# Patient Record
Sex: FEMALE | Race: WHITE | NOT HISPANIC OR LATINO | Employment: OTHER | URBAN - METROPOLITAN AREA
[De-identification: names, ages, dates, MRNs, and addresses within clinical notes are randomized per-mention and may not be internally consistent; named-entity substitution may affect disease eponyms.]

---

## 2023-01-19 PROBLEM — R26.89 IMBALANCE: Status: ACTIVE | Noted: 2023-01-19

## 2023-01-19 PROBLEM — M19.90 OSTEOARTHROSIS: Status: ACTIVE | Noted: 2023-01-19

## 2023-01-19 PROBLEM — Z78.9 INTOLERANCE OF CONTINUOUS POSITIVE AIRWAY PRESSURE (CPAP) VENTILATION: Status: ACTIVE | Noted: 2023-01-19

## 2023-01-19 PROBLEM — A08.4 VIRAL GASTROENTERITIS: Status: ACTIVE | Noted: 2023-01-19

## 2023-01-19 PROBLEM — R45.851 SUICIDAL IDEATION: Status: ACTIVE | Noted: 2023-01-19

## 2023-01-19 PROBLEM — R19.7 DIARRHEA: Status: ACTIVE | Noted: 2023-01-19

## 2023-01-19 PROBLEM — G47.50 PARASOMNIA: Status: ACTIVE | Noted: 2023-01-19

## 2023-01-19 PROBLEM — L98.9 SKIN DISORDER: Status: ACTIVE | Noted: 2023-01-19

## 2023-01-19 PROBLEM — E78.49 FAMILIAL COMBINED HYPERLIPIDEMIA: Status: ACTIVE | Noted: 2023-01-19

## 2023-01-19 PROBLEM — E10.9 TYPE I DIABETES MELLITUS (MULTI): Status: ACTIVE | Noted: 2023-01-19

## 2023-01-19 PROBLEM — R26.9 GAIT ABNORMALITY: Status: ACTIVE | Noted: 2023-01-19

## 2023-01-19 PROBLEM — G47.30 SLEEP APNEA: Status: ACTIVE | Noted: 2023-01-19

## 2023-01-19 PROBLEM — R42 VERTIGO: Status: ACTIVE | Noted: 2023-01-19

## 2023-01-19 PROBLEM — F51.12 INSUFFICIENT SLEEP SYNDROME: Status: ACTIVE | Noted: 2023-01-19

## 2023-01-19 PROBLEM — E55.9 VITAMIN D DEFICIENCY: Status: ACTIVE | Noted: 2023-01-19

## 2023-01-19 PROBLEM — R51.9 LEFT FACIAL PAIN: Status: ACTIVE | Noted: 2023-01-19
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
## 2023-01-19 PROBLEM — R00.2 PALPITATIONS: Status: ACTIVE | Noted: 2023-01-19

## 2023-01-19 PROBLEM — D50.9 ANEMIA, IRON DEFICIENCY: Status: ACTIVE | Noted: 2023-01-19

## 2023-01-19 PROBLEM — G47.31 CENTRAL SLEEP APNEA: Status: ACTIVE | Noted: 2023-01-19

## 2023-01-19 PROBLEM — Z12.31 OTHER SCREENING MAMMOGRAM: Status: ACTIVE | Noted: 2023-01-19

## 2023-01-19 PROBLEM — R20.0 RIGHT ARM NUMBNESS: Status: ACTIVE | Noted: 2023-01-19

## 2023-01-19 PROBLEM — E66.9 OBESITY: Status: ACTIVE | Noted: 2023-01-19

## 2023-01-19 PROBLEM — F33.9 DEPRESSION, RECURRENT (CMS-HCC): Status: ACTIVE | Noted: 2023-01-19

## 2023-01-19 PROBLEM — E78.5 HYPERLIPIDEMIA: Status: ACTIVE | Noted: 2023-01-19

## 2023-01-19 PROBLEM — G47.33 OBSTRUCTIVE SLEEP APNEA: Status: ACTIVE | Noted: 2023-01-19

## 2023-01-19 PROBLEM — R29.91: Status: ACTIVE | Noted: 2023-01-19

## 2023-01-19 PROBLEM — G47.19 EXCESSIVE DAYTIME SLEEPINESS: Status: ACTIVE | Noted: 2023-01-19

## 2023-01-19 PROBLEM — L91.8 SKIN TAG: Status: ACTIVE | Noted: 2023-01-19

## 2023-01-19 PROBLEM — R92.8 ABNORMAL ULTRASOUND OF BREAST: Status: ACTIVE | Noted: 2023-01-19

## 2023-01-19 PROBLEM — G40.201: Status: ACTIVE | Noted: 2023-01-19

## 2023-01-19 PROBLEM — R41.3 MEMORY LOSS: Status: ACTIVE | Noted: 2023-01-19

## 2023-01-19 PROBLEM — R59.0 AXILLARY LYMPHADENOPATHY: Status: ACTIVE | Noted: 2023-01-19

## 2023-01-19 PROBLEM — R06.81 APNEA: Status: ACTIVE | Noted: 2023-01-19

## 2023-01-19 PROBLEM — G47.00 INSOMNIA: Status: ACTIVE | Noted: 2023-01-19

## 2023-01-19 PROBLEM — G40.909 EPILEPSY (MULTI): Status: ACTIVE | Noted: 2023-01-19

## 2023-01-19 PROBLEM — F09 COGNITIVE DISORDER: Status: ACTIVE | Noted: 2023-01-19

## 2023-01-19 PROBLEM — I10 HYPERTENSION: Status: ACTIVE | Noted: 2023-01-19

## 2023-01-19 PROBLEM — R92.8 ABNORMAL MAMMOGRAM: Status: ACTIVE | Noted: 2023-01-19

## 2023-01-19 PROBLEM — R55 SYNCOPE: Status: ACTIVE | Noted: 2023-01-19

## 2023-01-19 PROBLEM — K21.9 ESOPHAGEAL REFLUX: Status: ACTIVE | Noted: 2023-01-19

## 2023-01-19 PROBLEM — N64.89 HEMATOMA OF BREAST: Status: ACTIVE | Noted: 2023-01-19

## 2023-01-19 PROBLEM — T50.905S: Status: ACTIVE | Noted: 2023-01-19

## 2023-01-19 PROBLEM — E11.9 TYPE 2 DIABETES MELLITUS (MULTI): Status: ACTIVE | Noted: 2023-01-19

## 2023-01-19 PROBLEM — R42 DIZZINESS: Status: ACTIVE | Noted: 2023-01-19

## 2023-01-19 PROBLEM — N63.20 LEFT BREAST MASS: Status: ACTIVE | Noted: 2023-01-19

## 2023-01-19 PROBLEM — N64.4 BREAST PAIN, LEFT: Status: ACTIVE | Noted: 2023-01-19

## 2023-01-19 RX ORDER — CALCIUM CARBONATE/VITAMIN D3 600MG-5MCG
1 TABLET ORAL DAILY
COMMUNITY
Start: 2013-06-10

## 2023-01-19 RX ORDER — LACOSAMIDE 100 MG/1
1 TABLET ORAL 2 TIMES DAILY
COMMUNITY
Start: 2016-12-15 | End: 2024-02-19 | Stop reason: ALTCHOICE

## 2023-01-19 RX ORDER — METFORMIN HYDROCHLORIDE 500 MG/1
1 TABLET ORAL DAILY
COMMUNITY
Start: 2012-09-02 | End: 2023-03-13

## 2023-01-19 RX ORDER — FERROUS SULFATE 325(65) MG
1 TABLET ORAL DAILY
COMMUNITY
Start: 2020-08-28

## 2023-01-19 RX ORDER — DONEPEZIL HYDROCHLORIDE 5 MG/1
1 TABLET, FILM COATED ORAL NIGHTLY
COMMUNITY
Start: 2022-05-31

## 2023-01-19 RX ORDER — DULOXETIN HYDROCHLORIDE 60 MG/1
1 CAPSULE, DELAYED RELEASE ORAL 2 TIMES DAILY
COMMUNITY
Start: 2014-07-16

## 2023-01-19 RX ORDER — MECLIZINE HCL 12.5 MG 12.5 MG/1
1 TABLET ORAL 3 TIMES DAILY PRN
COMMUNITY
Start: 2021-10-08

## 2023-01-19 RX ORDER — ATORVASTATIN CALCIUM 20 MG/1
1 TABLET, FILM COATED ORAL DAILY
COMMUNITY
Start: 2012-10-20 | End: 2023-04-15

## 2023-01-19 RX ORDER — OMEPRAZOLE 20 MG/1
1 CAPSULE, DELAYED RELEASE ORAL DAILY
COMMUNITY
Start: 2012-09-23 | End: 2023-03-27

## 2023-01-19 RX ORDER — MELOXICAM 7.5 MG/1
1 TABLET ORAL
COMMUNITY
Start: 2013-05-16

## 2023-01-19 RX ORDER — LACOSAMIDE 200 MG/1
1 TABLET ORAL 2 TIMES DAILY
COMMUNITY
Start: 2016-09-07 | End: 2024-02-19 | Stop reason: ALTCHOICE

## 2023-01-19 RX ORDER — MULTIVIT-MIN/FA/LYCOPEN/LUTEIN .4-300-25
1 TABLET ORAL DAILY
COMMUNITY
Start: 2013-06-10

## 2023-01-19 RX ORDER — IBUPROFEN 800 MG/1
1 TABLET ORAL
COMMUNITY
Start: 2022-06-16

## 2023-01-19 RX ORDER — LOSARTAN POTASSIUM 50 MG/1
1 TABLET ORAL DAILY
COMMUNITY
Start: 2012-09-02 | End: 2023-04-15

## 2023-03-03 LAB
ALANINE AMINOTRANSFERASE (SGPT) (U/L) IN SER/PLAS: 12 U/L (ref 7–45)
ALBUMIN (G/DL) IN SER/PLAS: 4.1 G/DL (ref 3.4–5)
ALKALINE PHOSPHATASE (U/L) IN SER/PLAS: 65 U/L (ref 33–136)
ANION GAP IN SER/PLAS: 14 MMOL/L (ref 10–20)
ASPARTATE AMINOTRANSFERASE (SGOT) (U/L) IN SER/PLAS: 12 U/L (ref 9–39)
BILIRUBIN TOTAL (MG/DL) IN SER/PLAS: 0.5 MG/DL (ref 0–1.2)
CALCIUM (MG/DL) IN SER/PLAS: 9.5 MG/DL (ref 8.6–10.6)
CARBON DIOXIDE, TOTAL (MMOL/L) IN SER/PLAS: 29 MMOL/L (ref 21–32)
CHLORIDE (MMOL/L) IN SER/PLAS: 104 MMOL/L (ref 98–107)
CREATININE (MG/DL) IN SER/PLAS: 0.66 MG/DL (ref 0.5–1.05)
ESTIMATED AVERAGE GLUCOSE FOR HBA1C: 105 MG/DL
GFR FEMALE: >90 ML/MIN/1.73M2
GLUCOSE (MG/DL) IN SER/PLAS: 113 MG/DL (ref 74–99)
HEMOGLOBIN A1C/HEMOGLOBIN TOTAL IN BLOOD: 5.3 %
POTASSIUM (MMOL/L) IN SER/PLAS: 4.6 MMOL/L (ref 3.5–5.3)
PROTEIN TOTAL: 6.2 G/DL (ref 6.4–8.2)
SODIUM (MMOL/L) IN SER/PLAS: 142 MMOL/L (ref 136–145)
UREA NITROGEN (MG/DL) IN SER/PLAS: 13 MG/DL (ref 6–23)

## 2023-03-05 ASSESSMENT — ENCOUNTER SYMPTOMS
POLYDIPSIA: 0
FATIGUE: 0
MYALGIAS: 0
HYPERTENSION: 1
POLYPHAGIA: 0

## 2023-03-05 NOTE — PROGRESS NOTES
Subjective   Chief complaint: Estella M Lanese is a 73 y.o. female who presents for multiple chronic problems.    HPI:  Diabetes  She presents for her follow-up diabetic visit. She has type 2 diabetes mellitus. Pertinent negatives for diabetes include no chest pain, no fatigue, no foot ulcerations, no polydipsia and no polyphagia.   Hypertension  This is a chronic problem. The current episode started more than 1 year ago. The problem is unchanged. The problem is controlled. Pertinent negatives include no anxiety or chest pain.   Hyperlipidemia  This is a chronic problem. The current episode started more than 1 year ago. The problem is controlled. Pertinent negatives include no chest pain or myalgias.       Review of Systems   Constitutional:  Negative for fatigue.   Cardiovascular:  Negative for chest pain.   Endocrine: Negative for polydipsia and polyphagia.   Musculoskeletal:  Negative for myalgias.     All systems reviewed and negative except for what was mentioned in the HPI    Objective   There were no vitals taken for this visit.  Physical Exam  Vitals reviewed.   Constitutional:       Appearance: Normal appearance.   HENT:      Head: Normocephalic and atraumatic.   Cardiovascular:      Rate and Rhythm: Normal rate and regular rhythm.   Pulmonary:      Effort: Pulmonary effort is normal.      Breath sounds: Normal breath sounds.   Abdominal:      General: Bowel sounds are normal.      Palpations: Abdomen is soft.   Musculoskeletal:      Cervical back: Neck supple.   Skin:     General: Skin is warm and dry.   Neurological:      General: No focal deficit present.      Mental Status: She is alert.   Psychiatric:         Mood and Affect: Mood normal.         Behavior: Behavior is cooperative.         I have reviewed and reconciled the medication list with the patient today.   Current Outpatient Medications:     calcium carbonate-vitamin D3 600 mg-5 mcg (200 unit) tablet, Take 1 tablet by mouth in the morning.,  Disp: , Rfl:     donepezil (Aricept) 5 mg tablet, Take 1 tablet (5 mg) by mouth at bedtime., Disp: , Rfl:     DULoxetine (Cymbalta) 60 mg DR capsule, Take 1 capsule (60 mg) by mouth in the morning and at bedtime., Disp: , Rfl:     ferrous sulfate 325 (65 Fe) MG tablet, Take 1 tablet (325 mg) by mouth in the morning., Disp: , Rfl:     ibuprofen 800 mg tablet, Take 1 tablet (800 mg) by mouth after breakfast, after lunch, and after evening meal., Disp: , Rfl:     lacosamide (Vimpat) 100 mg tablet, Take 1 tablet (100 mg) by mouth in the morning and at bedtime. WITH 200 MG TABLET, Disp: , Rfl:     lacosamide (Vimpat) 200 mg tablet tablet, Take 1 tablet (200 mg) by mouth in the morning and at bedtime. WITH 100 MG TABLET, Disp: , Rfl:     losartan (Cozaar) 50 mg tablet, Take 1 tablet (50 mg) by mouth in the morning., Disp: , Rfl:     meloxicam (Mobic) 7.5 mg tablet, Take 1 tablet (7.5 mg) by mouth with breakfast and with evening meal., Disp: , Rfl:     metFORMIN (Glucophage) 500 mg tablet, Take 1 tablet (500 mg) by mouth in the morning., Disp: , Rfl:     multivitamin-iron-minerals-folic acid (Centrum Silver) 0.4 mg-300 mcg- 250 mcg tablet, Take 1 tablet by mouth in the morning., Disp: , Rfl:     omeprazole (PriLOSEC) 20 mg DR capsule, Take 1 capsule (20 mg) by mouth in the morning., Disp: , Rfl:     atorvastatin (Lipitor) 20 mg tablet, Take 1 tablet (20 mg) by mouth in the morning., Disp: , Rfl:     meclizine (Antivert) 12.5 mg tablet, Take 1 tablet (12.5 mg) by mouth if needed in the morning, at noon, and at bedtime., Disp: , Rfl:      Imaging:  No results found.     Labs reviewed:    Lab Results   Component Value Date    WBC 7.3 12/06/2022    HGB 12.9 12/06/2022    HCT 44.2 12/06/2022     12/06/2022    CHOL 169 12/06/2022    TRIG 115 12/06/2022    HDL 64.5 12/06/2022    ALT 12 03/03/2023    AST 12 03/03/2023     03/03/2023    K 4.6 03/03/2023     03/03/2023    CREATININE 0.66 03/03/2023    BUN 13  03/03/2023    CO2 29 03/03/2023    TSH 1.63 12/06/2022    INR 1.1 06/27/2021    HGBA1C 5.3 03/03/2023       Assessment/Plan   Problem List Items Addressed This Visit          Nervous    Epilepsy (CMS/HCC)     Continue Keppra and lacosamide for the seizures.         Cognitive disorder     Stable continue Aricept            Respiratory    Apnea - Primary     Patient refused to use the CPAP and she understand the risk.            Circulatory    Hypertension     Continue lisinopril blood pressure stable under control.            Digestive    Esophageal reflux     Continue PPI avoid acidic food.            Musculoskeletal    Osteoarthrosis     Continue meloxicam pain under control            Endocrine/Metabolic    Type 2 diabetes mellitus (CMS/HCC)     Follow low-carb diet continue metformin.         Vitamin D deficiency     Continue vitamin D check the level twice a year.            Other    Depression, recurrent (CMS/HCC)     Continue antidepressant.  Follow-up with psychiatrist.         Hyperlipidemia     Continue statin follow low-fat low-carb diet flaxseed and fish oil.            Continue current medications as listed  Follow up in 3 months we will check a BMP, hemoglobin A1c, mammogram

## 2023-03-06 ENCOUNTER — OFFICE VISIT (OUTPATIENT)
Dept: PRIMARY CARE | Facility: CLINIC | Age: 73
End: 2023-03-06
Payer: MEDICARE

## 2023-03-06 VITALS
HEART RATE: 77 BPM | BODY MASS INDEX: 26.89 KG/M2 | RESPIRATION RATE: 12 BRPM | WEIGHT: 147 LBS | OXYGEN SATURATION: 98 % | DIASTOLIC BLOOD PRESSURE: 62 MMHG | SYSTOLIC BLOOD PRESSURE: 130 MMHG

## 2023-03-06 DIAGNOSIS — I10 HYPERTENSION, UNSPECIFIED TYPE: ICD-10-CM

## 2023-03-06 DIAGNOSIS — F33.9 DEPRESSION, RECURRENT (CMS-HCC): ICD-10-CM

## 2023-03-06 DIAGNOSIS — E11.9 TYPE 2 DIABETES MELLITUS WITHOUT COMPLICATION, WITHOUT LONG-TERM CURRENT USE OF INSULIN (MULTI): Primary | ICD-10-CM

## 2023-03-06 DIAGNOSIS — F09 COGNITIVE DISORDER: ICD-10-CM

## 2023-03-06 DIAGNOSIS — G40.909 NONINTRACTABLE EPILEPSY WITHOUT STATUS EPILEPTICUS, UNSPECIFIED EPILEPSY TYPE (MULTI): ICD-10-CM

## 2023-03-06 DIAGNOSIS — E55.9 VITAMIN D DEFICIENCY: ICD-10-CM

## 2023-03-06 DIAGNOSIS — K21.9 GASTROESOPHAGEAL REFLUX DISEASE WITHOUT ESOPHAGITIS: ICD-10-CM

## 2023-03-06 DIAGNOSIS — E78.5 HYPERLIPIDEMIA, UNSPECIFIED HYPERLIPIDEMIA TYPE: ICD-10-CM

## 2023-03-06 DIAGNOSIS — F02.B4 MODERATE ALZHEIMER'S DEMENTIA WITH ANXIETY, UNSPECIFIED TIMING OF DEMENTIA ONSET (MULTI): ICD-10-CM

## 2023-03-06 DIAGNOSIS — F03.B4 MODERATE DEMENTIA WITH ANXIETY, UNSPECIFIED DEMENTIA TYPE (MULTI): ICD-10-CM

## 2023-03-06 DIAGNOSIS — M19.91 PRIMARY OSTEOARTHRITIS, UNSPECIFIED SITE: ICD-10-CM

## 2023-03-06 DIAGNOSIS — E11.10 TYPE 2 DIABETES MELLITUS WITH KETOACIDOSIS WITHOUT COMA, WITHOUT LONG-TERM CURRENT USE OF INSULIN (MULTI): ICD-10-CM

## 2023-03-06 DIAGNOSIS — G30.9 MODERATE ALZHEIMER'S DEMENTIA WITH ANXIETY, UNSPECIFIED TIMING OF DEMENTIA ONSET (MULTI): ICD-10-CM

## 2023-03-06 DIAGNOSIS — R06.81 APNEA: ICD-10-CM

## 2023-03-06 PROCEDURE — 1036F TOBACCO NON-USER: CPT | Performed by: INTERNAL MEDICINE

## 2023-03-06 PROCEDURE — 3044F HG A1C LEVEL LT 7.0%: CPT | Performed by: INTERNAL MEDICINE

## 2023-03-06 PROCEDURE — 1159F MED LIST DOCD IN RCRD: CPT | Performed by: INTERNAL MEDICINE

## 2023-03-06 PROCEDURE — 3078F DIAST BP <80 MM HG: CPT | Performed by: INTERNAL MEDICINE

## 2023-03-06 PROCEDURE — 99214 OFFICE O/P EST MOD 30 MIN: CPT | Performed by: INTERNAL MEDICINE

## 2023-03-06 PROCEDURE — 3075F SYST BP GE 130 - 139MM HG: CPT | Performed by: INTERNAL MEDICINE

## 2023-03-06 PROCEDURE — 4010F ACE/ARB THERAPY RXD/TAKEN: CPT | Performed by: INTERNAL MEDICINE

## 2023-03-06 PROCEDURE — 82962 GLUCOSE BLOOD TEST: CPT | Performed by: INTERNAL MEDICINE

## 2023-03-06 PROCEDURE — 1160F RVW MEDS BY RX/DR IN RCRD: CPT | Performed by: INTERNAL MEDICINE

## 2023-03-06 NOTE — PROGRESS NOTES
Subjective   Chief complaint: Estella M Lanese is a 73 y.o. female who presents for No chief complaint on file..    HPI:  Patient is here today for blood work follow up and general medical care.         Review of Systems  All systems reviewed and negative except for what was mentioned in the HPI    Objective   There were no vitals taken for this visit.  Physical Exam    I have reviewed and reconciled the medication list with the patient today.   Current Outpatient Medications:     calcium carbonate-vitamin D3 600 mg-5 mcg (200 unit) tablet, Take 1 tablet by mouth in the morning., Disp: , Rfl:     donepezil (Aricept) 5 mg tablet, Take 1 tablet (5 mg) by mouth at bedtime., Disp: , Rfl:     DULoxetine (Cymbalta) 60 mg DR capsule, Take 1 capsule (60 mg) by mouth in the morning and at bedtime., Disp: , Rfl:     ferrous sulfate 325 (65 Fe) MG tablet, Take 1 tablet (325 mg) by mouth in the morning., Disp: , Rfl:     ibuprofen 800 mg tablet, Take 1 tablet (800 mg) by mouth after breakfast, after lunch, and after evening meal., Disp: , Rfl:     lacosamide (Vimpat) 100 mg tablet, Take 1 tablet (100 mg) by mouth in the morning and at bedtime. WITH 200 MG TABLET, Disp: , Rfl:     lacosamide (Vimpat) 200 mg tablet tablet, Take 1 tablet (200 mg) by mouth in the morning and at bedtime. WITH 100 MG TABLET, Disp: , Rfl:     losartan (Cozaar) 50 mg tablet, Take 1 tablet (50 mg) by mouth in the morning., Disp: , Rfl:     meloxicam (Mobic) 7.5 mg tablet, Take 1 tablet (7.5 mg) by mouth with breakfast and with evening meal., Disp: , Rfl:     metFORMIN (Glucophage) 500 mg tablet, Take 1 tablet (500 mg) by mouth in the morning., Disp: , Rfl:     multivitamin-iron-minerals-folic acid (Centrum Silver) 0.4 mg-300 mcg- 250 mcg tablet, Take 1 tablet by mouth in the morning., Disp: , Rfl:     omeprazole (PriLOSEC) 20 mg DR capsule, Take 1 capsule (20 mg) by mouth in the morning., Disp: , Rfl:     atorvastatin (Lipitor) 20 mg tablet, Take 1  tablet (20 mg) by mouth in the morning., Disp: , Rfl:     meclizine (Antivert) 12.5 mg tablet, Take 1 tablet (12.5 mg) by mouth if needed in the morning, at noon, and at bedtime., Disp: , Rfl:      Imaging:  No results found.     Labs reviewed:    Lab Results   Component Value Date    WBC 7.3 12/06/2022    HGB 12.9 12/06/2022    HCT 44.2 12/06/2022     12/06/2022    CHOL 169 12/06/2022    TRIG 115 12/06/2022    HDL 64.5 12/06/2022    ALT 12 03/03/2023    AST 12 03/03/2023     03/03/2023    K 4.6 03/03/2023     03/03/2023    CREATININE 0.66 03/03/2023    BUN 13 03/03/2023    CO2 29 03/03/2023    TSH 1.63 12/06/2022    INR 1.1 06/27/2021    HGBA1C 5.3 03/03/2023       Assessment/Plan   Problem List Items Addressed This Visit          Nervous    Epilepsy (CMS/HCC)     Continue Keppra and lacosamide for the seizures.         Cognitive disorder     Stable continue Aricept            Respiratory    Apnea - Primary     Patient refused to use the CPAP and she understand the risk.            Circulatory    Hypertension     Continue lisinopril blood pressure stable under control.            Digestive    Esophageal reflux     Continue PPI avoid acidic food.            Musculoskeletal    Osteoarthrosis     Continue meloxicam pain under control            Endocrine/Metabolic    Type 2 diabetes mellitus (CMS/HCC)     Follow low-carb diet continue metformin.         Vitamin D deficiency     Continue vitamin D check the level twice a year.            Other    Depression, recurrent (CMS/HCC)     Continue antidepressant.  Follow-up with psychiatrist.         Hyperlipidemia     Continue statin follow low-fat low-carb diet flaxseed and fish oil.            Continue current medications as listed  Follow up in 3m

## 2023-03-13 DIAGNOSIS — E10.69 TYPE 1 DIABETES MELLITUS WITH OTHER SPECIFIED COMPLICATION (MULTI): ICD-10-CM

## 2023-03-13 RX ORDER — METFORMIN HYDROCHLORIDE 500 MG/1
TABLET ORAL
Qty: 90 TABLET | Refills: 2 | Status: SHIPPED | OUTPATIENT
Start: 2023-03-13

## 2023-03-15 ENCOUNTER — PATIENT OUTREACH (OUTPATIENT)
Dept: CARE COORDINATION | Facility: CLINIC | Age: 73
End: 2023-03-15
Payer: COMMERCIAL

## 2023-03-26 DIAGNOSIS — K21.9 GASTROESOPHAGEAL REFLUX DISEASE WITHOUT ESOPHAGITIS: ICD-10-CM

## 2023-03-27 RX ORDER — OMEPRAZOLE 20 MG/1
CAPSULE, DELAYED RELEASE ORAL
Qty: 90 CAPSULE | Refills: 2 | Status: SHIPPED | OUTPATIENT
Start: 2023-03-27

## 2023-04-13 DIAGNOSIS — E78.5 HYPERLIPIDEMIA, UNSPECIFIED HYPERLIPIDEMIA TYPE: ICD-10-CM

## 2023-04-13 DIAGNOSIS — I10 HYPERTENSION, UNSPECIFIED TYPE: ICD-10-CM

## 2023-04-15 RX ORDER — ATORVASTATIN CALCIUM 20 MG/1
TABLET, FILM COATED ORAL
Qty: 90 TABLET | Refills: 3 | Status: SHIPPED | OUTPATIENT
Start: 2023-04-15

## 2023-04-15 RX ORDER — LOSARTAN POTASSIUM 50 MG/1
TABLET ORAL
Qty: 90 TABLET | Refills: 3 | Status: SHIPPED | OUTPATIENT
Start: 2023-04-15

## 2023-06-05 ENCOUNTER — TELEPHONE (OUTPATIENT)
Dept: PRIMARY CARE | Facility: CLINIC | Age: 73
End: 2023-06-05
Payer: COMMERCIAL

## 2023-06-05 NOTE — TELEPHONE ENCOUNTER
Transition of Care    Inpatient facility: [facility]   Discharge diagnosis: seizure activity   Discharged to: home  Discharge date: 6/6/23  Initial Call date: 6/5/23  Spoke with patient/caregiver: scheduling                                                                     Do you need assistance  visits prior to your PCP visit: Yes  Home health care ordered: No  Have you been contacted by home care and have a start of care date: No  Are you taking medications as prescribed at discharge: Yes    Referral to [facility] Pharmacist: No  Patient advised to bring all medications to PCP follow-up appointment.  Patient advised to follow discharge instructions until provider follow-up.  TCM visit date: 6/13/23   TCM provider visit with: Dr Jaime

## 2023-07-12 ENCOUNTER — NURSING HOME VISIT (OUTPATIENT)
Dept: POST ACUTE CARE | Facility: EXTERNAL LOCATION | Age: 73
End: 2023-07-12
Payer: COMMERCIAL

## 2023-07-12 DIAGNOSIS — R53.1 WEAKNESS: Primary | ICD-10-CM

## 2023-07-12 DIAGNOSIS — G40.201 PARTIAL SYMPTOMATIC EPILEPSY WITH COMPLEX PARTIAL SEIZURES, NOT INTRACTABLE, WITH STATUS EPILEPTICUS (MULTI): ICD-10-CM

## 2023-07-12 DIAGNOSIS — F02.B4 MODERATE ALZHEIMER'S DEMENTIA WITH ANXIETY, UNSPECIFIED TIMING OF DEMENTIA ONSET (MULTI): ICD-10-CM

## 2023-07-12 DIAGNOSIS — G30.9 MODERATE ALZHEIMER'S DEMENTIA WITH ANXIETY, UNSPECIFIED TIMING OF DEMENTIA ONSET (MULTI): ICD-10-CM

## 2023-07-12 DIAGNOSIS — E11.9 TYPE 2 DIABETES MELLITUS WITHOUT COMPLICATION, WITHOUT LONG-TERM CURRENT USE OF INSULIN (MULTI): ICD-10-CM

## 2023-07-12 DIAGNOSIS — I10 HYPERTENSION, ESSENTIAL: ICD-10-CM

## 2023-07-12 PROBLEM — N64.89 HEMATOMA OF BREAST: Status: RESOLVED | Noted: 2023-01-19 | Resolved: 2023-07-12

## 2023-07-12 PROBLEM — R42 VERTIGO: Status: RESOLVED | Noted: 2023-01-19 | Resolved: 2023-07-12

## 2023-07-12 PROBLEM — N64.4 BREAST PAIN, LEFT: Status: RESOLVED | Noted: 2023-01-19 | Resolved: 2023-07-12

## 2023-07-12 PROBLEM — G40.909 EPILEPSY (MULTI): Status: RESOLVED | Noted: 2023-01-19 | Resolved: 2023-07-12
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
## 2023-07-12 PROBLEM — E78.5 HYPERLIPIDEMIA: Status: RESOLVED | Noted: 2023-01-19 | Resolved: 2023-07-12

## 2023-07-12 PROBLEM — T50.905S: Status: RESOLVED | Noted: 2023-01-19 | Resolved: 2023-07-12

## 2023-07-12 PROBLEM — R06.81 APNEA: Status: RESOLVED | Noted: 2023-01-19 | Resolved: 2023-07-12

## 2023-07-12 PROBLEM — R42 DIZZINESS: Status: RESOLVED | Noted: 2023-01-19 | Resolved: 2023-07-12

## 2023-07-12 PROBLEM — R51.9 LEFT FACIAL PAIN: Status: RESOLVED | Noted: 2023-01-19 | Resolved: 2023-07-12

## 2023-07-12 PROBLEM — Z12.31 OTHER SCREENING MAMMOGRAM: Status: RESOLVED | Noted: 2023-01-19 | Resolved: 2023-07-12

## 2023-07-12 PROBLEM — G47.30 SLEEP APNEA: Status: RESOLVED | Noted: 2023-01-19 | Resolved: 2023-07-12

## 2023-07-12 PROBLEM — R45.851 SUICIDAL IDEATION: Status: RESOLVED | Noted: 2023-01-19 | Resolved: 2023-07-12

## 2023-07-12 PROBLEM — F09 COGNITIVE DISORDER: Status: RESOLVED | Noted: 2023-01-19 | Resolved: 2023-07-12

## 2023-07-12 PROBLEM — R26.9 GAIT ABNORMALITY: Status: RESOLVED | Noted: 2023-01-19 | Resolved: 2023-07-12

## 2023-07-12 PROBLEM — R20.0 RIGHT ARM NUMBNESS: Status: RESOLVED | Noted: 2023-01-19 | Resolved: 2023-07-12

## 2023-07-12 PROBLEM — A08.4 VIRAL GASTROENTERITIS: Status: RESOLVED | Noted: 2023-01-19 | Resolved: 2023-07-12

## 2023-07-12 PROBLEM — G47.19 EXCESSIVE DAYTIME SLEEPINESS: Status: RESOLVED | Noted: 2023-01-19 | Resolved: 2023-07-12

## 2023-07-12 PROBLEM — R19.7 DIARRHEA: Status: RESOLVED | Noted: 2023-01-19 | Resolved: 2023-07-12

## 2023-07-12 PROBLEM — E10.9 TYPE I DIABETES MELLITUS (MULTI): Status: RESOLVED | Noted: 2023-01-19 | Resolved: 2023-07-12

## 2023-07-12 PROBLEM — R41.3 MEMORY LOSS: Status: RESOLVED | Noted: 2023-01-19 | Resolved: 2023-07-12

## 2023-07-12 PROBLEM — R55 SYNCOPE: Status: RESOLVED | Noted: 2023-01-19 | Resolved: 2023-07-12

## 2023-07-12 PROBLEM — R26.89 IMBALANCE: Status: RESOLVED | Noted: 2023-01-19 | Resolved: 2023-07-12

## 2023-07-12 PROCEDURE — 99309 SBSQ NF CARE MODERATE MDM 30: CPT | Performed by: NURSE PRACTITIONER

## 2023-07-12 NOTE — PROGRESS NOTES
PROGRESS NOTE    Subjective   Chief complaint: Estella M Lanese is a 73 y.o. female who is an acute skilled patient being seen and evaluated for weakness    HPI:  7/12/2023 patient admitted to skilled nursing facility for therapy and possible long-term care placement due to weakness and confusion.  Patient was at a another facility and was taken home by family.  She had fallen and was found by family with feces all over herself.  Patient was taken to the emergency room and work-up was unremarkable.  She was admitted for placement and was discharged to [facility].      Objective   Vital signs: 117/62, 97.7, 68, 20, 98%, blood sugar 127    Physical Exam  Constitutional:       General: She is not in acute distress.  Eyes:      Extraocular Movements: Extraocular movements intact.   Cardiovascular:      Rate and Rhythm: Normal rate and regular rhythm.   Pulmonary:      Effort: Pulmonary effort is normal.      Breath sounds: Normal breath sounds.   Abdominal:      General: Bowel sounds are normal.      Palpations: Abdomen is soft.   Musculoskeletal:      Cervical back: Neck supple.      Right lower leg: No edema.      Left lower leg: No edema.      Comments: Generalized weakness   Neurological:      Mental Status: She is alert.   Psychiatric:         Mood and Affect: Mood normal.         Behavior: Behavior is cooperative.         Assessment/Plan   Problem List Items Addressed This Visit       Hypertension, essential     Blood pressure at goal  Continue antihypertensives  Continue to monitor         Moderate Alzheimer's dementia with anxiety, unspecified timing of dementia onset (CMS/[facility])     Speech therapy for cognition         Partial symptomatic epilepsy with complex partial seizures, not intractable, with status epilepticus (CMS/[facility])     Lacosamide         Type 2 diabetes mellitus without complication, without long-term current use of insulin (CMS/[facility])     3/3/2023 A1c 5.3  Not currently on  any medications for diabetes  Monitor         Weakness - Primary     Therapy to eval and treat          Medications, treatments, and labs reviewed  Continue medications and treatments as listed in EMR    Jeanette Arellano, APRN-CNP

## 2023-07-12 NOTE — LETTER
Patient: Estella Lanese  : 1950    Encounter Date: 2023    PROGRESS NOTE    Subjective  Chief complaint: Estella M Lanese is a 73 y.o. female who is an acute skilled patient being seen and evaluated for weakness    HPI:  2023 patient admitted to skilled nursing facility for therapy and possible long-term care placement due to weakness and confusion.  Patient was at a another facility and was taken home by family.  She had fallen and was found by family with feces all over herself.  Patient was taken to the emergency room and work-up was unremarkable.  She was admitted for placement and was discharged to [facility].      Objective  Vital signs: 117/62, 97.7, 68, 20, 98%, blood sugar 127    Physical Exam  Constitutional:       General: She is not in acute distress.  Eyes:      Extraocular Movements: Extraocular movements intact.   Cardiovascular:      Rate and Rhythm: Normal rate and regular rhythm.   Pulmonary:      Effort: Pulmonary effort is normal.      Breath sounds: Normal breath sounds.   Abdominal:      General: Bowel sounds are normal.      Palpations: Abdomen is soft.   Musculoskeletal:      Cervical back: Neck supple.      Right lower leg: No edema.      Left lower leg: No edema.      Comments: Generalized weakness   Neurological:      Mental Status: She is alert.   Psychiatric:         Mood and Affect: Mood normal.         Behavior: Behavior is cooperative.         Assessment/Plan  Problem List Items Addressed This Visit       Hypertension, essential     Blood pressure at goal  Continue antihypertensives  Continue to monitor         Moderate Alzheimer's dementia with anxiety, unspecified timing of dementia onset (CMS/HCC)     Speech therapy for cognition         Partial symptomatic epilepsy with complex partial seizures, not intractable, with status epilepticus (CMS/[facility])     Lacosamide         Type 2 diabetes mellitus without complication, without long-term  current use of insulin (CMS/[facility])     3/3/2023 A1c 5.3  Not currently on any medications for diabetes  Monitor         Weakness - Primary     Therapy to eval and treat          Medications, treatments, and labs reviewed  Continue medications and treatments as listed in EMR    SHERRI Cherry      Electronically Signed By: SHERRI Cherry   7/12/23  4:11 PM

## 2023-07-13 ENCOUNTER — NURSING HOME VISIT (OUTPATIENT)
Dept: POST ACUTE CARE | Facility: EXTERNAL LOCATION | Age: 73
End: 2023-07-13
Payer: COMMERCIAL

## 2023-07-13 DIAGNOSIS — G30.9 MODERATE ALZHEIMER'S DEMENTIA WITH ANXIETY, UNSPECIFIED TIMING OF DEMENTIA ONSET (MULTI): ICD-10-CM

## 2023-07-13 DIAGNOSIS — F02.B4 MODERATE ALZHEIMER'S DEMENTIA WITH ANXIETY, UNSPECIFIED TIMING OF DEMENTIA ONSET (MULTI): ICD-10-CM

## 2023-07-13 DIAGNOSIS — I10 HYPERTENSION, ESSENTIAL: ICD-10-CM

## 2023-07-13 DIAGNOSIS — E11.9 TYPE 2 DIABETES MELLITUS WITHOUT COMPLICATION, WITHOUT LONG-TERM CURRENT USE OF INSULIN (MULTI): ICD-10-CM

## 2023-07-13 DIAGNOSIS — R53.1 WEAKNESS: Primary | ICD-10-CM

## 2023-07-13 PROCEDURE — 99309 SBSQ NF CARE MODERATE MDM 30: CPT | Performed by: NURSE PRACTITIONER

## 2023-07-13 NOTE — LETTER
Patient: Estella Lanese  : 1950    Encounter Date: 2023    PROGRESS NOTE    Subjective  Chief complaint: Estella M Lanese is a 73 y.o. female who is an acute skilled patient being seen and evaluated for weakness    HPI:  2023 patient admitted to skilled nursing facility for therapy and possible long-term care placement due to weakness and confusion.  Patient was at a another facility and was taken home by family.  She had fallen and was found by family with feces all over herself.  Patient was taken to the emergency room and work-up was unremarkable.  She was admitted for placement and was discharged to [facility].    2023 Patient continues working with therapy.  Patient is working on strengthening, transfers, and ADLs.  She is able to ambulate with walker with CGA.  Has no new concerns today.  Feeling OK.  Denies constitutional symptoms.  No new concerns per nursing.        Objective  Vital signs: 128/77,     Physical Exam  Constitutional:       General: She is not in acute distress.  Eyes:      Extraocular Movements: Extraocular movements intact.   Cardiovascular:      Rate and Rhythm: Normal rate and regular rhythm.   Pulmonary:      Effort: Pulmonary effort is normal.      Breath sounds: Normal breath sounds.   Abdominal:      General: Bowel sounds are normal.      Palpations: Abdomen is soft.   Musculoskeletal:      Cervical back: Neck supple.      Right lower leg: No edema.      Left lower leg: No edema.      Comments: Generalized weakness   Neurological:      Mental Status: She is alert.   Psychiatric:         Mood and Affect: Mood normal.         Behavior: Behavior is cooperative.         Assessment/Plan  Problem List Items Addressed This Visit       Hypertension, essential     Blood pressure at goal  Continue antihypertensives  Continue to monitor         Moderate Alzheimer's dementia with anxiety, unspecified timing of dementia onset (CMS/[facility])      Speech therapy for cognition         Type 2 diabetes mellitus without complication, without long-term current use of insulin (CMS/[facility])     3/3/2023 A1c 5.3  Not currently on any medications for diabetes  Monitor         Weakness - Primary     Continue with therapy          Medications, treatments, and labs reviewed  Continue medications and treatments as listed in EMR    SHERRI Cherry      Electronically Signed By: SHERRI Cherry   7/15/23 12:44 PM

## 2023-07-13 NOTE — PROGRESS NOTES
PROGRESS NOTE    Subjective   Chief complaint: Estella M Lanese is a 73 y.o. female who is an acute skilled patient being seen and evaluated for weakness    HPI:  7/12/2023 patient admitted to skilled nursing facility for therapy and possible long-term care placement due to weakness and confusion.  Patient was at a another facility and was taken home by family.  She had fallen and was found by family with feces all over herself.  Patient was taken to the emergency room and work-up was unremarkable.  She was admitted for placement and was discharged to [facility].    7/13/2023 Patient continues working with therapy.  Patient is working on strengthening, transfers, and ADLs.  She is able to ambulate with walker with CGA.  Has no new concerns today.  Feeling OK.  Denies constitutional symptoms.  No new concerns per nursing.        Objective   Vital signs: 128/77,     Physical Exam  Constitutional:       General: She is not in acute distress.  Eyes:      Extraocular Movements: Extraocular movements intact.   Cardiovascular:      Rate and Rhythm: Normal rate and regular rhythm.   Pulmonary:      Effort: Pulmonary effort is normal.      Breath sounds: Normal breath sounds.   Abdominal:      General: Bowel sounds are normal.      Palpations: Abdomen is soft.   Musculoskeletal:      Cervical back: Neck supple.      Right lower leg: No edema.      Left lower leg: No edema.      Comments: Generalized weakness   Neurological:      Mental Status: She is alert.   Psychiatric:         Mood and Affect: Mood normal.         Behavior: Behavior is cooperative.         Assessment/Plan   Problem List Items Addressed This Visit       Hypertension, essential     Blood pressure at goal  Continue antihypertensives  Continue to monitor         Moderate Alzheimer's dementia with anxiety, unspecified timing of dementia onset (CMS/[facility])     Speech therapy for cognition         Type 2 diabetes mellitus without  complication, without long-term current use of insulin (CMS/[facility])     3/3/2023 A1c 5.3  Not currently on any medications for diabetes  Monitor         Weakness - Primary     Continue with therapy          Medications, treatments, and labs reviewed  Continue medications and treatments as listed in EMR    Jeanette Arellano, APRJAMAL-CNP

## 2023-07-14 ENCOUNTER — NURSING HOME VISIT (OUTPATIENT)
Dept: POST ACUTE CARE | Facility: EXTERNAL LOCATION | Age: 73
End: 2023-07-14
Payer: COMMERCIAL

## 2023-07-14 DIAGNOSIS — E11.9 TYPE 2 DIABETES MELLITUS WITHOUT COMPLICATION, WITHOUT LONG-TERM CURRENT USE OF INSULIN (MULTI): ICD-10-CM

## 2023-07-14 DIAGNOSIS — E55.9 VITAMIN D DEFICIENCY: ICD-10-CM

## 2023-07-14 DIAGNOSIS — G40.201 PARTIAL SYMPTOMATIC EPILEPSY WITH COMPLEX PARTIAL SEIZURES, NOT INTRACTABLE, WITH STATUS EPILEPTICUS (MULTI): ICD-10-CM

## 2023-07-14 DIAGNOSIS — I10 HYPERTENSION, ESSENTIAL: Primary | ICD-10-CM

## 2023-07-14 DIAGNOSIS — G47.33 OBSTRUCTIVE SLEEP APNEA: ICD-10-CM

## 2023-07-14 DIAGNOSIS — M15.3 POST-TRAUMATIC OSTEOARTHRITIS OF MULTIPLE JOINTS: ICD-10-CM

## 2023-07-14 DIAGNOSIS — G30.9 MODERATE ALZHEIMER'S DEMENTIA WITH ANXIETY, UNSPECIFIED TIMING OF DEMENTIA ONSET (MULTI): ICD-10-CM

## 2023-07-14 DIAGNOSIS — F02.B4 MODERATE ALZHEIMER'S DEMENTIA WITH ANXIETY, UNSPECIFIED TIMING OF DEMENTIA ONSET (MULTI): ICD-10-CM

## 2023-07-14 DIAGNOSIS — R53.1 WEAKNESS: ICD-10-CM

## 2023-07-14 PROCEDURE — 99305 1ST NF CARE MODERATE MDM 35: CPT | Performed by: INTERNAL MEDICINE

## 2023-07-14 NOTE — LETTER
Patient: Estella Lanese  : 1950    Encounter Date: 2023    HISTORY & PHYSICAL    Subjective  Chief complaint: Estella M Lanese is a 73 y.o. female who is a acute skilled care patient being seen and evaluated for multiple medical problems.  Patient presents for weakness.    HPI:  2023 patient admitted to skilled nursing facility for therapy and possible long-term care placement due to weakness and confusion.  Patient was at a another facility and was taken home by family.  She had fallen and was found by family with feces all over herself.  Patient was taken to the emergency room and work-up was unremarkable.  She was admitted for placement and was discharged to [facility].        Past Medical History:   Diagnosis Date   • Acute sinusitis, unspecified 06/10/2013    Acute sinusitis   • Age-related osteoporosis without current pathological fracture 06/10/2013    Osteoporosis   • Atrophic disorder of skin, unspecified 06/10/2013    Atrophoderma   • Benign neoplasm of unspecified breast 06/10/2013    Benign neoplasm of female breast   • Encounter for general adult medical examination without abnormal findings 2018    Encounter for annual health examination   • Encounter for general adult medical examination without abnormal findings 2021    Encounter for annual health examination   • Encounter for general adult medical examination without abnormal findings 2021    Encounter for annual health examination   • Encounter for general adult medical examination without abnormal findings     Encounter for annual health examination   • Encounter for screening mammogram for malignant neoplasm of breast     Visit for screening mammogram   • Excessive daytime sleepiness 2023   • Nausea 2014    Nausea   • Other conditions influencing health status     Hemigastrectomy   • Personal history of diseases of the skin and subcutaneous tissue 06/10/2013    History of  skin disorder   • Personal history of other diseases of the circulatory system     History of hypertension   • Personal history of other diseases of the respiratory system 12/22/2017    History of bronchitis   • Personal history of other endocrine, nutritional and metabolic disease     History of high cholesterol   • Personal history of other specified conditions 08/13/2019    History of dizziness   • Suicidal ideation 01/19/2023   • Type 2 diabetes mellitus without complications (CMS/[facility]) 12/07/2022    Diabetes mellitus   • Vertigo 01/19/2023       Past Surgical History:   Procedure Laterality Date   • COLONOSCOPY  03/28/2013    Complete Colonoscopy   • OTHER SURGICAL HISTORY  03/28/2013    Brain Surgery   • OTHER SURGICAL HISTORY  03/28/2013    Excision Of Lesion Genitalia       Family History   Problem Relation Name Age of Onset   • Alzheimer's disease Mother     • Diabetes Mother     • Sleep apnea Mother     • Heart attack Father     • Other (malignant neoplasm of breast) Sister     • Other (Coronary Artery Surgery) Brother     • Other (Multiple family members have migrane headaches) Other         Social History     Socioeconomic History   • Marital status:      Spouse name: Not on file   • Number of children: Not on file   • Years of education: Not on file   • Highest education level: Not on file   Occupational History   • Not on file   Tobacco Use   • Smoking status: Never   • Smokeless tobacco: Never   Substance and Sexual Activity   • Alcohol use: Not on file   • Drug use: Not on file   • Sexual activity: Not on file   Other Topics Concern   • Not on file   Social History Narrative   • Not on file     Social Determinants of Health     Financial Resource Strain: Not on file   Food Insecurity: Not on file   Transportation Needs: Not on file   Physical Activity: Not on file   Stress: Not on file   Social Connections: Not on file   Intimate Partner Violence: Not on file   Housing Stability: Not on file        Vital signs: 133/52, 97.9, 68, 18, 142.0, 97%    Objective  Physical Exam  Vitals reviewed.   Constitutional:       Appearance: Normal appearance.   HENT:      Head: Normocephalic and atraumatic.   Cardiovascular:      Rate and Rhythm: Normal rate and regular rhythm.   Pulmonary:      Effort: Pulmonary effort is normal.      Breath sounds: Normal breath sounds.   Abdominal:      General: Bowel sounds are normal.      Palpations: Abdomen is soft.   Musculoskeletal:      Cervical back: Neck supple.   Skin:     General: Skin is warm and dry.   Neurological:      General: No focal deficit present.      Mental Status: She is alert.   Psychiatric:         Mood and Affect: Mood normal.         Behavior: Behavior is cooperative.         Assessment/Plan  Problem List Items Addressed This Visit       Type 2 diabetes mellitus without complication, without long-term current use of insulin (CMS/[facility])    Hypertension, essential - Primary    Obstructive sleep apnea    Osteoarthrosis    Partial symptomatic epilepsy with complex partial seizures, not intractable, with status epilepticus (CMS/[facility])    Vitamin D deficiency    Moderate Alzheimer's dementia with anxiety, unspecified timing of dementia onset (CMS/[facility])    Weakness     Medications, treatments, and labs reviewed  Continue medications and treatments as listed in [facility]    Scribe Attestation  I, Kellen Ribeiro   attest that this documentation has been prepared under the direction and in the presence of Christopher Jaime MD.    Provider Attestation - Scribe documentation  All medical record entries made by the Scribe were at my direction and personally dictated by me. I have reviewed the chart and agree that the record accurately reflects my personal performance of the history, physical exam, discussion and plan.    Christopher Jaime MD          Electronically Signed By: Christopher Jaime MD   7/17/23  2:51 PM

## 2023-07-14 NOTE — PROGRESS NOTES
HISTORY & PHYSICAL    Subjective   Chief complaint: Estella M Lanese is a 73 y.o. female who is a acute skilled care patient being seen and evaluated for multiple medical problems.  Patient presents for weakness.    HPI:  7/12/2023 patient admitted to skilled nursing facility for therapy and possible long-term care placement due to weakness and confusion.  Patient was at a another facility and was taken home by family.  She had fallen and was found by family with feces all over herself.  Patient was taken to the emergency room and work-up was unremarkable.  She was admitted for placement and was discharged to [facility].        Past Medical History:   Diagnosis Date    Acute sinusitis, unspecified 06/10/2013    Acute sinusitis    Age-related osteoporosis without current pathological fracture 06/10/2013    Osteoporosis    Atrophic disorder of skin, unspecified 06/10/2013    Atrophoderma    Benign neoplasm of unspecified breast 06/10/2013    Benign neoplasm of female breast    Encounter for general adult medical examination without abnormal findings 11/12/2018    Encounter for annual health examination    Encounter for general adult medical examination without abnormal findings 11/12/2021    Encounter for annual health examination    Encounter for general adult medical examination without abnormal findings 06/21/2021    Encounter for annual health examination    Encounter for general adult medical examination without abnormal findings     Encounter for annual health examination    Encounter for screening mammogram for malignant neoplasm of breast     Visit for screening mammogram    Excessive daytime sleepiness 01/19/2023    Nausea 02/19/2014    Nausea    Other conditions influencing health status     Hemigastrectomy    Personal history of diseases of the skin and subcutaneous tissue 06/10/2013    History of skin disorder    Personal history of other diseases of the circulatory system      History of hypertension    Personal history of other diseases of the respiratory system 12/22/2017    History of bronchitis    Personal history of other endocrine, nutritional and metabolic disease     History of high cholesterol    Personal history of other specified conditions 08/13/2019    History of dizziness    Suicidal ideation 01/19/2023    Type 2 diabetes mellitus without complications (CMS/[facility]) 12/07/2022    Diabetes mellitus    Vertigo 01/19/2023       Past Surgical History:   Procedure Laterality Date    COLONOSCOPY  03/28/2013    Complete Colonoscopy    OTHER SURGICAL HISTORY  03/28/2013    Brain Surgery    OTHER SURGICAL HISTORY  03/28/2013    Excision Of Lesion Genitalia       Family History   Problem Relation Name Age of Onset    Alzheimer's disease Mother      Diabetes Mother      Sleep apnea Mother      Heart attack Father      Other (malignant neoplasm of breast) Sister      Other (Coronary Artery Surgery) Brother      Other (Multiple family members have migrane headaches) Other         Social History     Socioeconomic History    Marital status:      Spouse name: Not on file    Number of children: Not on file    Years of education: Not on file    Highest education level: Not on file   Occupational History    Not on file   Tobacco Use    Smoking status: Never    Smokeless tobacco: Never   Substance and Sexual Activity    Alcohol use: Not on file    Drug use: Not on file    Sexual activity: Not on file   Other Topics Concern    Not on file   Social History Narrative    Not on file     Social Determinants of Health     Financial Resource Strain: Not on file   Food Insecurity: Not on file   Transportation Needs: Not on file   Physical Activity: Not on file   Stress: Not on file   Social Connections: Not on file   Intimate Partner Violence: Not on file   Housing Stability: Not on file       Vital signs: 133/52, 97.9, 68, 18, 142.0, 97%    Objective   Physical Exam  Vitals reviewed.    Constitutional:       Appearance: Normal appearance.   HENT:      Head: Normocephalic and atraumatic.   Cardiovascular:      Rate and Rhythm: Normal rate and regular rhythm.   Pulmonary:      Effort: Pulmonary effort is normal.      Breath sounds: Normal breath sounds.   Abdominal:      General: Bowel sounds are normal.      Palpations: Abdomen is soft.   Musculoskeletal:      Cervical back: Neck supple.   Skin:     General: Skin is warm and dry.   Neurological:      General: No focal deficit present.      Mental Status: She is alert.   Psychiatric:         Mood and Affect: Mood normal.         Behavior: Behavior is cooperative.         Assessment/Plan   Problem List Items Addressed This Visit       Type 2 diabetes mellitus without complication, without long-term current use of insulin (CMS/[facility])    Hypertension, essential - Primary    Obstructive sleep apnea    Osteoarthrosis    Partial symptomatic epilepsy with complex partial seizures, not intractable, with status epilepticus (CMS/[facility])    Vitamin D deficiency    Moderate Alzheimer's dementia with anxiety, unspecified timing of dementia onset (CMS/[facility])    Weakness     Medications, treatments, and labs reviewed  Continue medications and treatments as listed in [facility]    Scribe Attestation  IMckenna Scribjazzmine   attest that this documentation has been prepared under the direction and in the presence of Christopher Jaime MD.    Provider Attestation - Scribe documentation  All medical record entries made by the Scribe were at my direction and personally dictated by me. I have reviewed the chart and agree that the record accurately reflects my personal performance of the history, physical exam, discussion and plan.    Christopher Jaime MD

## 2023-07-17 ENCOUNTER — NURSING HOME VISIT (OUTPATIENT)
Dept: POST ACUTE CARE | Facility: EXTERNAL LOCATION | Age: 73
End: 2023-07-17
Payer: COMMERCIAL

## 2023-07-17 DIAGNOSIS — E11.9 TYPE 2 DIABETES MELLITUS WITHOUT COMPLICATION, WITHOUT LONG-TERM CURRENT USE OF INSULIN (MULTI): ICD-10-CM

## 2023-07-17 DIAGNOSIS — F02.B4 MODERATE ALZHEIMER'S DEMENTIA WITH ANXIETY, UNSPECIFIED TIMING OF DEMENTIA ONSET (MULTI): ICD-10-CM

## 2023-07-17 DIAGNOSIS — I10 HYPERTENSION, ESSENTIAL: ICD-10-CM

## 2023-07-17 DIAGNOSIS — G30.9 MODERATE ALZHEIMER'S DEMENTIA WITH ANXIETY, UNSPECIFIED TIMING OF DEMENTIA ONSET (MULTI): ICD-10-CM

## 2023-07-17 DIAGNOSIS — R53.1 WEAKNESS: Primary | ICD-10-CM

## 2023-07-17 PROCEDURE — 99309 SBSQ NF CARE MODERATE MDM 30: CPT | Performed by: NURSE PRACTITIONER

## 2023-07-17 NOTE — LETTER
Patient: Estella Lanese  : 1950    Encounter Date: 2023    PROGRESS NOTE    Subjective  Chief complaint: Estella M Lanese is a 73 y.o. female who is an acute skilled patient being seen and evaluated for weakness    HPI:  2023 patient admitted to skilled nursing facility for therapy and possible long-term care placement due to weakness and confusion.  Patient was at a another facility and was taken home by family.  She had fallen and was found by family with feces all over herself.  Patient was taken to the emergency room and work-up was unremarkable.  She was admitted for placement and was discharged to [facility].    2023 Patient continues working with therapy.  Patient is working on strengthening, transfers, and ADLs.  She is able to ambulate with walker with CGA.  Has no new concerns today.  Feeling OK.  Denies constitutional symptoms.  No new concerns per nursing.      2023 Patient is working with therapy and presents for follow-up.  Patient is working on strengthening, balance, transfers, and ADLs.  Uneventful night.  No new concerns per nursing staff.  Stable on current regime.  Denies nausea vomiting fever chills.       Objective  Vital signs: 121/70, 18, 97.9, 69, 97%, blood sugar 147    Physical Exam  Constitutional:       General: She is not in acute distress.  Eyes:      Extraocular Movements: Extraocular movements intact.   Cardiovascular:      Rate and Rhythm: Normal rate and regular rhythm.   Pulmonary:      Effort: Pulmonary effort is normal.      Breath sounds: Normal breath sounds.   Abdominal:      General: Bowel sounds are normal.      Palpations: Abdomen is soft.   Musculoskeletal:      Cervical back: Neck supple.      Right lower leg: No edema.      Left lower leg: No edema.      Comments: Generalized weakness   Neurological:      Mental Status: She is alert.   Psychiatric:         Mood and Affect: Mood normal.         Behavior: Behavior is  cooperative.         Assessment/Plan  Problem List Items Addressed This Visit       Hypertension, essential     Blood pressure at goal  Continue antihypertensives  Continue to monitor         Moderate Alzheimer's dementia with anxiety, unspecified timing of dementia onset (CMS/[facility])     Speech therapy for cognition         Type 2 diabetes mellitus without complication, without long-term current use of insulin (CMS/[facility])     3/3/2023 A1c 5.3  FBG at goal  Not currently on any medications for diabetes  Monitor         Weakness - Primary     Working with therapy  Actively participating  Continue therapy          Medications, treatments, and labs reviewed  Continue medications and treatments as listed in EMR    SHERRI Cherry      Electronically Signed By: SHERRI Cherry   7/19/23  3:15 PM

## 2023-07-18 ENCOUNTER — NURSING HOME VISIT (OUTPATIENT)
Dept: POST ACUTE CARE | Facility: EXTERNAL LOCATION | Age: 73
End: 2023-07-18
Payer: COMMERCIAL

## 2023-07-18 DIAGNOSIS — R53.1 WEAKNESS: ICD-10-CM

## 2023-07-18 DIAGNOSIS — I10 HYPERTENSION, ESSENTIAL: ICD-10-CM

## 2023-07-18 PROCEDURE — 99308 SBSQ NF CARE LOW MDM 20: CPT | Performed by: INTERNAL MEDICINE

## 2023-07-18 NOTE — PROGRESS NOTES
PROGRESS NOTE    Subjective   Chief complaint: Estella M Lanese is a 73 y.o. female who is an acute skilled patient being seen and evaluated for weakness    HPI:  7/12/2023 patient admitted to skilled nursing facility for therapy and possible long-term care placement due to weakness and confusion.  Patient was at a another facility and was taken home by family.  She had fallen and was found by family with feces all over herself.  Patient was taken to the emergency room and work-up was unremarkable.  She was admitted for placement and was discharged to [facility].    7/13/2023 Patient continues working with therapy.  Patient is working on strengthening, transfers, and ADLs.  She is able to ambulate with walker with CGA.  Has no new concerns today.  Feeling OK.  Denies constitutional symptoms.  No new concerns per nursing.      7/17/2023 Patient is working with therapy and presents for follow-up.  Patient is working on strengthening, balance, transfers, and ADLs.  Uneventful night.  No new concerns per nursing staff.  Stable on current regime.  Denies nausea vomiting fever chills.       Objective   Vital signs: 121/70, 18, 97.9, 69, 97%, blood sugar 147    Physical Exam  Constitutional:       General: She is not in acute distress.  Eyes:      Extraocular Movements: Extraocular movements intact.   Cardiovascular:      Rate and Rhythm: Normal rate and regular rhythm.   Pulmonary:      Effort: Pulmonary effort is normal.      Breath sounds: Normal breath sounds.   Abdominal:      General: Bowel sounds are normal.      Palpations: Abdomen is soft.   Musculoskeletal:      Cervical back: Neck supple.      Right lower leg: No edema.      Left lower leg: No edema.      Comments: Generalized weakness   Neurological:      Mental Status: She is alert.   Psychiatric:         Mood and Affect: Mood normal.         Behavior: Behavior is cooperative.         Assessment/Plan   Problem List Items Addressed  This Visit       Hypertension, essential     Blood pressure at goal  Continue antihypertensives  Continue to monitor         Moderate Alzheimer's dementia with anxiety, unspecified timing of dementia onset (CMS/[facility])     Speech therapy for cognition         Type 2 diabetes mellitus without complication, without long-term current use of insulin (CMS/[facility])     3/3/2023 A1c 5.3  FBG at goal  Not currently on any medications for diabetes  Monitor         Weakness - Primary     Working with therapy  Actively participating  Continue therapy          Medications, treatments, and labs reviewed  Continue medications and treatments as listed in EMR    Jeanette Arellano, APRN-CNP

## 2023-07-18 NOTE — LETTER
Patient: Estella Lanese  : 1950    Encounter Date: 2023    PROGRESS NOTE    Subjective  Chief complaint: Estella M Lanese is a 73 y.o. female who is an acute skilled patient being seen and evaluated for weakness    HPI:  Patient working in therapy due to weakness and debility. Requires assistance for transfers, ADL's and mobility. Denies constitutional symptoms. No acute distress. Denies chest pain or headache.       Objective  Vital signs: 138/76, 98%    Physical Exam  Constitutional:       General: She is not in acute distress.  Eyes:      Extraocular Movements: Extraocular movements intact.   Cardiovascular:      Rate and Rhythm: Normal rate and regular rhythm.   Pulmonary:      Effort: Pulmonary effort is normal.      Breath sounds: Normal breath sounds.   Abdominal:      General: Bowel sounds are normal.      Palpations: Abdomen is soft.   Musculoskeletal:      Cervical back: Neck supple.      Right lower leg: No edema.      Left lower leg: No edema.   Neurological:      Mental Status: She is alert.   Psychiatric:         Mood and Affect: Mood normal.         Behavior: Behavior is cooperative.         Assessment/Plan  Problem List Items Addressed This Visit          Cardiac and Vasculature    Hypertension, essential     Blood pressure at goal  Continue antihypertensives  Continue to monitor            Symptoms and Signs    Weakness     Continue with therapy          Medications, treatments, and labs reviewed  Continue medications and treatments as listed in [facility]    Scribe Attestation  By signing my name below, I, Kellen Vega   attest that this documentation has been prepared under the direction and in the presence of Christopher Jaime MD.    Provider Attestation - Scribe documentation  All medical record entries made by the Scribe were at my direction and personally dictated by me. I have reviewed the chart and agree that the record accurately reflects my personal performance of the history,  physical exam, discussion and plan.      Electronically Signed By: Christopher Jaime MD   7/18/23  5:54 PM

## 2023-07-18 NOTE — PROGRESS NOTES
PROGRESS NOTE    Subjective   Chief complaint: Estella M Lanese is a 73 y.o. female who is an acute skilled patient being seen and evaluated for weakness    HPI:  Patient working in therapy due to weakness and debility. Requires assistance for transfers, ADL's and mobility. Denies constitutional symptoms. No acute distress. Denies chest pain or headache.       Objective   Vital signs: 138/76, 98%    Physical Exam  Constitutional:       General: She is not in acute distress.  Eyes:      Extraocular Movements: Extraocular movements intact.   Cardiovascular:      Rate and Rhythm: Normal rate and regular rhythm.   Pulmonary:      Effort: Pulmonary effort is normal.      Breath sounds: Normal breath sounds.   Abdominal:      General: Bowel sounds are normal.      Palpations: Abdomen is soft.   Musculoskeletal:      Cervical back: Neck supple.      Right lower leg: No edema.      Left lower leg: No edema.   Neurological:      Mental Status: She is alert.   Psychiatric:         Mood and Affect: Mood normal.         Behavior: Behavior is cooperative.         Assessment/Plan   Problem List Items Addressed This Visit          Cardiac and Vasculature    Hypertension, essential     Blood pressure at goal  Continue antihypertensives  Continue to monitor            Symptoms and Signs    Weakness     Continue with therapy          Medications, treatments, and labs reviewed  Continue medications and treatments [facility] in [facility]    Scribe Attestation  By signing my name below, ISheba Scribe   attest that this documentation has been prepared under the direction and in the presence of Christopher Jaime MD.    Provider Attestation - Scribe documentation  All medical record entries made by the Scribe were at my direction and personally dictated by me. I have reviewed the chart and agree that the record accurately reflects my personal performance of the history, physical exam, discussion and plan.

## 2023-07-19 ENCOUNTER — NURSING HOME VISIT (OUTPATIENT)
Dept: POST ACUTE CARE | Facility: EXTERNAL LOCATION | Age: 73
End: 2023-07-19
Payer: COMMERCIAL

## 2023-07-19 DIAGNOSIS — R53.1 WEAKNESS: Primary | ICD-10-CM

## 2023-07-19 DIAGNOSIS — I10 HYPERTENSION, ESSENTIAL: ICD-10-CM

## 2023-07-19 DIAGNOSIS — G30.9 MODERATE ALZHEIMER'S DEMENTIA WITH ANXIETY, UNSPECIFIED TIMING OF DEMENTIA ONSET (MULTI): ICD-10-CM

## 2023-07-19 DIAGNOSIS — E11.9 TYPE 2 DIABETES MELLITUS WITHOUT COMPLICATION, WITHOUT LONG-TERM CURRENT USE OF INSULIN (MULTI): ICD-10-CM

## 2023-07-19 DIAGNOSIS — F02.B4 MODERATE ALZHEIMER'S DEMENTIA WITH ANXIETY, UNSPECIFIED TIMING OF DEMENTIA ONSET (MULTI): ICD-10-CM

## 2023-07-19 PROCEDURE — 99309 SBSQ NF CARE MODERATE MDM 30: CPT | Performed by: NURSE PRACTITIONER

## 2023-07-19 NOTE — LETTER
Patient: Estella Lanese  : 1950    Encounter Date: 2023    PROGRESS NOTE    Subjective  Chief complaint: Estella M Lanese is a 73 y.o. female who is a acute skilled care patient being seen and evaluated for weakness.    HPI:  2023 patient admitted to skilled nursing facility for therapy and possible long-term care placement due to weakness and confusion.  Patient was at a another facility and was taken home by family.  She had fallen and was found by family with feces all over herself.  Patient was taken to the emergency room and work-up was unremarkable.  She was admitted for placement and was discharged to [facility].    2023 Patient continues working with therapy.  Patient is working on strengthening, transfers, and ADLs.  She is able to ambulate with walker with CGA.  Has no new concerns today.  Feeling OK.  Denies constitutional symptoms.  No new concerns per nursing.      2023 Patient is working with therapy and presents for follow-up.  Patient is working on strengthening, balance, transfers, and ADLs.  Uneventful night.  No new concerns per nursing staff.  Stable on current regime.  Denies nausea vomiting fever chills.     23  Patient working in therapy due to weakness and debility. Requires assistance for transfers, ADL's and mobility. Denies constitutional symptoms. No acute distress. Denies chest pain or headache.     23  Patient is stable without complaint.  Denies n/v/f/c.  Continues to work towards goals in therapy.  No new concerns reported by nursing.          Objective  Vital signs:  18, 129/55, 98.4, 93, 98%, BS 86  Physical Exam  Constitutional:       General: She is not in acute distress.  Eyes:      Extraocular Movements: Extraocular movements intact.   Cardiovascular:      Rate and Rhythm: Normal rate and regular rhythm.   Pulmonary:      Effort: Pulmonary effort is normal.      Breath sounds: Normal breath sounds.   Abdominal:       General: Bowel sounds are normal.      Palpations: Abdomen is soft.   Musculoskeletal:      Cervical back: Neck supple.      Right lower leg: No edema.      Left lower leg: No edema.      Comments: Generalized weakness   Neurological:      Mental Status: She is alert.   Psychiatric:         Mood and Affect: Mood normal.         Behavior: Behavior is cooperative.         Assessment/Plan  Problem List Items Addressed This Visit       Hypertension, essential     Blood pressure at goal  Continue antihypertensives  Continue to monitor         Moderate Alzheimer's dementia with anxiety, unspecified timing of dementia onset (CMS/[facility])     Speech therapy for cognition         Type 2 diabetes mellitus without complication, without long-term current use of insulin (CMS/[facility])     3/3/2023 A1c 5.3  FBG at goal  Not currently on any medications for diabetes  Monitor         Weakness - Primary     Working with therapy  Actively participating  Continue therapy          Medications, treatments, and labs reviewed  Continue medications and treatments as listed in EMR    Scribe Attestation  Sylvie BREWER Scribe   attest that this documentation has been prepared under the direction and in the presence of SHERRI Cherry    Provider Attestation - Scribe documentation  All medical record entries made by the Scribe were at my direction and personally dictated by me. I have reviewed the chart and agree that the record accurately reflects my personal performance of the history, physical exam, discussion and plan.   SHERRI Cherry        Electronically Signed By: SHERRI Cherry   7/22/23 12:27 PM

## 2023-07-20 ENCOUNTER — NURSING HOME VISIT (OUTPATIENT)
Dept: POST ACUTE CARE | Facility: EXTERNAL LOCATION | Age: 73
End: 2023-07-20
Payer: COMMERCIAL

## 2023-07-20 DIAGNOSIS — G30.9 MODERATE ALZHEIMER'S DEMENTIA WITH ANXIETY, UNSPECIFIED TIMING OF DEMENTIA ONSET (MULTI): ICD-10-CM

## 2023-07-20 DIAGNOSIS — F02.B4 MODERATE ALZHEIMER'S DEMENTIA WITH ANXIETY, UNSPECIFIED TIMING OF DEMENTIA ONSET (MULTI): ICD-10-CM

## 2023-07-20 DIAGNOSIS — I10 HYPERTENSION, ESSENTIAL: ICD-10-CM

## 2023-07-20 DIAGNOSIS — R53.1 WEAKNESS: Primary | ICD-10-CM

## 2023-07-20 PROCEDURE — 99308 SBSQ NF CARE LOW MDM 20: CPT | Performed by: NURSE PRACTITIONER

## 2023-07-20 NOTE — PROGRESS NOTES
PROGRESS NOTE    Subjective   Chief complaint: Estella M Lanese is a 73 y.o. female who is an acute skilled patient being seen and evaluated for weakness    HPI:  7/12/2023 patient admitted to skilled nursing facility for therapy and possible long-term care placement due to weakness and confusion.  Patient was at a another facility and was taken home by family.  She had fallen and was found by family with feces all over herself.  Patient was taken to the emergency room and work-up was unremarkable.  She was admitted for placement and was discharged to [facility].    7/13/2023 Patient continues working with therapy.  Patient is working on strengthening, transfers, and ADLs.  She is able to ambulate with walker with CGA.  Has no new concerns today.  Feeling OK.  Denies constitutional symptoms.  No new concerns per nursing.      7/17/2023 Patient is working with therapy and presents for follow-up.  Patient is working on strengthening, balance, transfers, and ADLs.  Uneventful night.  No new concerns per nursing staff.  Stable on current regime.  Denies nausea vomiting fever chills.     7/18/23  Patient working in therapy due to weakness and debility. Requires assistance for transfers, ADL's and mobility. Denies constitutional symptoms. No acute distress. Denies chest pain or headache.     7/19/23  Patient is stable without complaint.  Denies n/v/f/c.  Continues to work towards goals in therapy.  No new concerns reported by nursing.    7/20/2023 Therapy has been working with the patient and continues to do so.  Nursing staff reports uneventful night with no new concerns regarding patient.  Patient denies constitutional symptoms.          Objective   Physical Exam  Constitutional:       General: She is not in acute distress.  Eyes:      Extraocular Movements: Extraocular movements intact.   Cardiovascular:      Rate and Rhythm: Normal rate and regular rhythm.   Pulmonary:      Effort: Pulmonary  effort is normal.      Breath sounds: Normal breath sounds.   Abdominal:      General: Bowel sounds are normal.      Palpations: Abdomen is soft.   Musculoskeletal:      Cervical back: Neck supple.      Right lower leg: No edema.      Left lower leg: No edema.      Comments: Generalized weakness   Neurological:      Mental Status: She is alert.   Psychiatric:         Mood and Affect: Mood normal.         Behavior: Behavior is cooperative.         Assessment/Plan   Problem List Items Addressed This Visit       Hypertension, essential     Blood pressure at goal  Continue antihypertensives  Continue to monitor         Moderate Alzheimer's dementia with anxiety, unspecified timing of dementia onset (CMS/[facility])     Speech therapy for cognition         Weakness - Primary     Working with therapy  Actively participating  Continue therapy          Medications, treatments, and labs reviewed  Continue medications and treatments as listed in EMR    Jeanette Arellano, BRENDA-CNP

## 2023-07-20 NOTE — Clinical Note
Patient: Estella Lanese  : 1950    Encounter Date: 2023    PROGRESS NOTE    Subjective  Chief complaint: Estella M Lanese is a 73 y.o. female who is an acute skilled patient being seen and evaluated for weakness    HPI:  2023 patient admitted to skilled nursing facility for therapy and possible long-term care placement due to weakness and confusion.  Patient was at a another facility and was taken home by family.  She had fallen and was found by family with feces all over herself.  Patient was taken to the emergency room and work-up was unremarkable.  She was admitted for placement and was discharged to [facility].    2023 Patient continues working with therapy.  Patient is working on strengthening, transfers, and ADLs.  She is able to ambulate with walker with CGA.  Has no new concerns today.  Feeling OK.  Denies constitutional symptoms.  No new concerns per nursing.      2023 Patient is working with therapy and presents for follow-up.  Patient is working on strengthening, balance, transfers, and ADLs.  Uneventful night.  No new concerns per nursing staff.  Stable on current regime.  Denies nausea vomiting fever chills.     23  Patient working in therapy due to weakness and debility. Requires assistance for transfers, ADL's and mobility. Denies constitutional symptoms. No acute distress. Denies chest pain or headache.     23  Patient is stable without complaint.  Denies n/v/f/c.  Continues to work towards goals in therapy.  No new concerns reported by nursing.          Objective  Vital signs: ***    Physical Exam  Constitutional:       General: She is not in acute distress.  Eyes:      Extraocular Movements: Extraocular movements intact.   Cardiovascular:      Rate and Rhythm: Normal rate and regular rhythm.   Pulmonary:      Effort: Pulmonary effort is normal.      Breath sounds: Normal breath sounds.   Abdominal:      General: Bowel sounds are  normal.      Palpations: Abdomen is soft.   Musculoskeletal:      Cervical back: Neck supple.      Right lower leg: No edema.      Left lower leg: No edema.      Comments: Generalized weakness   Neurological:      Mental Status: She is alert.   Psychiatric:         Mood and Affect: Mood normal.         Behavior: Behavior is cooperative.         Assessment/Plan  Problem List Items Addressed This Visit       Hypertension, essential     Blood pressure at goal  Continue antihypertensives  Continue to monitor         Moderate Alzheimer's dementia with anxiety, unspecified timing of dementia onset (CMS/[facility])     Speech therapy for cognition         Weakness - Primary     Working with therapy  Actively participating  Continue therapy          Medications, treatments, and labs reviewed  Continue medications and treatments as listed in EMR    SHERRI Cherry        Electronically Signed By: SHERRI Cherry   7/22/23 12:27 PM

## 2023-07-20 NOTE — PROGRESS NOTES
PROGRESS NOTE    Subjective   Chief complaint: Estella M Lanese is a 73 y.o. female who is a acute skilled care patient being seen and evaluated for weakness.    HPI:  7/12/2023 patient admitted to skilled nursing facility for therapy and possible long-term care placement due to weakness and confusion.  Patient was at a another facility and was taken home by family.  She had fallen and was found by family with feces all over herself.  Patient was taken to the emergency room and work-up was unremarkable.  She was admitted for placement and was discharged to [facility] nursing [facility].    7/13/2023 Patient continues working with therapy.  Patient is working on strengthening, transfers, and ADLs.  She is able to ambulate with walker with CGA.  Has no new concerns today.  Feeling OK.  Denies constitutional symptoms.  No new concerns per nursing.      7/17/2023 Patient is working with therapy and presents for follow-up.  Patient is working on strengthening, balance, transfers, and ADLs.  Uneventful night.  No new concerns per nursing staff.  Stable on current regime.  Denies nausea vomiting fever chills.     7/18/23  Patient working in therapy due to weakness and debility. Requires assistance for transfers, ADL's and mobility. Denies constitutional symptoms. No acute distress. Denies chest pain or headache.     7/19/23  Patient is stable without complaint.  Denies n/v/f/c.  Continues to work towards goals in therapy.  No new concerns reported by nursing.          Objective   Vital signs:  18, 129/55, 98.4, 93, 98%, BS 86  Physical Exam  Constitutional:       General: She is not in acute distress.  Eyes:      Extraocular Movements: Extraocular movements intact.   Cardiovascular:      Rate and Rhythm: Normal rate and regular rhythm.   Pulmonary:      Effort: Pulmonary effort is normal.      Breath sounds: Normal breath sounds.   Abdominal:      General: Bowel sounds are normal.      Palpations: Abdomen is soft.    Musculoskeletal:      Cervical back: Neck supple.      Right lower leg: No edema.      Left lower leg: No edema.      Comments: Generalized weakness   Neurological:      Mental Status: She is alert.   Psychiatric:         Mood and Affect: Mood normal.         Behavior: Behavior is cooperative.         Assessment/Plan   Problem List Items Addressed This Visit       Hypertension, essential     Blood pressure at goal  Continue antihypertensives  Continue to monitor         Moderate Alzheimer's dementia with anxiety, unspecified timing of dementia onset (CMS/[facility])     Speech therapy for cognition         Type 2 diabetes mellitus without complication, without long-term current use of insulin (CMS/[facility])     3/3/2023 A1c 5.3  FBG at goal  Not currently on any medications for diabetes  Monitor         Weakness - Primary     Working with therapy  Actively participating  Continue therapy          Medications, treatments, and labs reviewed  Continue medications and treatments as listed in EMR    Scribe Attestation  ISylvie Scribe   attest that this documentation has been prepared under the direction and in the presence of SHERRI Cherry    Provider Attestation - Scribe documentation  All medical record entries made by the Scribe were at my direction and personally dictated by me. I have reviewed the chart and agree that the record accurately reflects my personal performance of the history, physical exam, discussion and plan.   SHERRI Cherry

## 2023-07-21 ENCOUNTER — NURSING HOME VISIT (OUTPATIENT)
Dept: POST ACUTE CARE | Facility: EXTERNAL LOCATION | Age: 73
End: 2023-07-21
Payer: COMMERCIAL

## 2023-07-21 DIAGNOSIS — F02.B4 MODERATE ALZHEIMER'S DEMENTIA WITH ANXIETY, UNSPECIFIED TIMING OF DEMENTIA ONSET (MULTI): ICD-10-CM

## 2023-07-21 DIAGNOSIS — R53.1 WEAKNESS: ICD-10-CM

## 2023-07-21 DIAGNOSIS — G30.9 MODERATE ALZHEIMER'S DEMENTIA WITH ANXIETY, UNSPECIFIED TIMING OF DEMENTIA ONSET (MULTI): ICD-10-CM

## 2023-07-21 DIAGNOSIS — E11.9 TYPE 2 DIABETES MELLITUS WITHOUT COMPLICATION, WITHOUT LONG-TERM CURRENT USE OF INSULIN (MULTI): ICD-10-CM

## 2023-07-21 DIAGNOSIS — I10 HYPERTENSION, ESSENTIAL: Primary | ICD-10-CM

## 2023-07-21 PROCEDURE — 99309 SBSQ NF CARE MODERATE MDM 30: CPT | Performed by: INTERNAL MEDICINE

## 2023-07-21 NOTE — LETTER
Patient: Estella Lanese  : 1950    Encounter Date: 2023    PROGRESS NOTE    Subjective  Chief complaint: Estella M Lanese is a 73 y.o. female who is an acute skilled patient being seen and evaluated for weakness    HPI:  2023 patient admitted to skilled nursing facility for therapy and possible long-term care placement due to weakness and confusion.  Patient was at a another facility and was taken home by family.  She had fallen and was found by family with feces all over herself.  Patient was taken to the emergency room and work-up was unremarkable.  She was admitted for placement and was discharged to [facility].    2023 Patient continues working with therapy.  Patient is working on strengthening, transfers, and ADLs.  She is able to ambulate with walker with CGA.  Has no new concerns today.  Feeling OK.  Denies constitutional symptoms.  No new concerns per nursing.      2023 Patient is working with therapy and presents for follow-up.  Patient is working on strengthening, balance, transfers, and ADLs.  Uneventful night.  No new concerns per nursing staff.  Stable on current regime.  Denies nausea vomiting fever chills.     23  Patient working in therapy due to weakness and debility. Requires assistance for transfers, ADL's and mobility. Denies constitutional symptoms. No acute distress. Denies chest pain or headache.     23  Patient is stable without complaint.  Denies n/v/f/c.  Continues to work towards goals in therapy.  No new concerns reported by nursing.    23  patient   With dementia, mentation at baseline, continues to work in therapy.  Patient is working on strengthening, balance, for transfers and ADL retraining.   patient also working with speech therapy for cognition. Denies chest pain or headache.  Denies urinary frequency or increased thirst.   No new issues or concerns today.  No acute distress.          Objective  Vital signs:    121/72, 98%    Physical Exam  Constitutional:       General: She is not in acute distress.  Eyes:      Extraocular Movements: Extraocular movements intact.   Cardiovascular:      Rate and Rhythm: Normal rate and regular rhythm.   Pulmonary:      Effort: Pulmonary effort is normal.      Breath sounds: Normal breath sounds.   Abdominal:      General: Bowel sounds are normal.      Palpations: Abdomen is soft.   Musculoskeletal:      Cervical back: Neck supple.      Right lower leg: No edema.      Left lower leg: No edema.      Comments: Generalized weakness   Neurological:      Mental Status: She is alert.   Psychiatric:         Mood and Affect: Mood normal.         Behavior: Behavior is cooperative.         Assessment/Plan  Problem List Items Addressed This Visit          Cardiac and Vasculature    Hypertension, essential - Primary     Blood pressure at goal  Continue antihypertensives  Continue to monitor            Endocrine/Metabolic    Type 2 diabetes mellitus without complication, without long-term current use of insulin (CMS/[facility])     3/3/2023 A1c 5.3  FBG at goal  Not currently on any medications for diabetes  Monitor            Neuro    Moderate Alzheimer's dementia with anxiety, unspecified timing of dementia onset (CMS/[facility])     Speech therapy for cognition            Symptoms and Signs    Weakness     Working with therapy  Actively participating  Continue therapy        Medications, treatments, and labs reviewed  Continue medications and treatments as listed in EMR    Christopher Jaime MD  Scribe Attestation  By signing my name below, ISheba Scribe   attest that this documentation has been prepared under the direction and in the presence of Christopher Jaime MD.    Provider Attestation - Scribe documentation  All medical record entries made by the Scribe were at my direction and personally dictated by me. I have reviewed the chart and agree that the record accurately reflects my personal performance of  the history, physical exam, discussion and plan.  1. Hypertension, essential        2. Moderate Alzheimer's dementia with anxiety, unspecified timing of dementia onset (CMS/HCC)        3. Type 2 diabetes mellitus without complication, without long-term current use of insulin (CMS/[facility])        4. Weakness              Electronically Signed By: Christopher Jaime MD   7/21/23  5:18 PM

## 2023-07-21 NOTE — PROGRESS NOTES
PROGRESS NOTE    Subjective   Chief complaint: Estella M Lanese is a 73 y.o. female who is an acute skilled patient being seen and evaluated for weakness    HPI:  7/12/2023 patient admitted to skilled nursing facility for therapy and possible long-term care placement due to weakness and confusion.  Patient was at a another facility and was taken home by family.  She had fallen and was found by family with feces all over herself.  Patient was taken to the emergency room and work-up was unremarkable.  She was admitted for placement and was discharged to [facility].    7/13/2023 Patient continues working with therapy.  Patient is working on strengthening, transfers, and ADLs.  She is able to ambulate with walker with CGA.  Has no new concerns today.  Feeling OK.  Denies constitutional symptoms.  No new concerns per nursing.      7/17/2023 Patient is working with therapy and presents for follow-up.  Patient is working on strengthening, balance, transfers, and ADLs.  Uneventful night.  No new concerns per nursing staff.  Stable on current regime.  Denies nausea vomiting fever chills.     7/18/23  Patient working in therapy due to weakness and debility. Requires assistance for transfers, ADL's and mobility. Denies constitutional symptoms. No acute distress. Denies chest pain or headache.     7/19/23  Patient is stable without complaint.  Denies n/v/f/c.  Continues to work towards goals in therapy.  No new concerns reported by nursing.    7/21/23  patient   With dementia, mentation at baseline, continues to work in therapy.  Patient is working on strengthening, balance, for transfers and ADL retraining.   patient also working with speech therapy for cognition. Denies chest pain or headache.  Denies urinary frequency or increased thirst.   No new issues or concerns today.  No acute distress.          Objective   Vital signs:   121/72, 98%    Physical Exam  Constitutional:       General: She is not  in acute distress.  Eyes:      Extraocular Movements: Extraocular movements intact.   Cardiovascular:      Rate and Rhythm: Normal rate and regular rhythm.   Pulmonary:      Effort: Pulmonary effort is normal.      Breath sounds: Normal breath sounds.   Abdominal:      General: Bowel sounds are normal.      Palpations: Abdomen is soft.   Musculoskeletal:      Cervical back: Neck supple.      Right lower leg: No edema.      Left lower leg: No edema.      Comments: Generalized weakness   Neurological:      Mental Status: She is alert.   Psychiatric:         Mood and Affect: Mood normal.         Behavior: Behavior is cooperative.         Assessment/Plan   Problem List Items Addressed This Visit          Cardiac and Vasculature    Hypertension, essential - Primary     Blood pressure at goal  Continue antihypertensives  Continue to monitor            Endocrine/Metabolic    Type 2 diabetes mellitus without complication, without long-term current use of insulin (CMS/[facility])     3/3/2023 A1c 5.3  FBG at goal  Not currently on any medications for diabetes  Monitor            Neuro    Moderate Alzheimer's dementia with anxiety, unspecified timing of dementia onset (CMS/[facility])     Speech therapy for cognition            Symptoms and Signs    Weakness     Working with therapy  Actively participating  Continue therapy        Medications, treatments, and labs reviewed  Continue medications and treatments as listed in EMR    Christopher Jaime MD  Scribe Attestation  By signing my name below, I, Sheba Garcia, Kellen   attest that this documentation has been prepared under the direction and in the presence of Christopher Jaime MD.    Provider Attestation - Scribe documentation  All medical record entries made by the Scribe were at my direction and personally dictated by me. I have reviewed the chart and agree that the record accurately reflects my personal performance of the history, physical exam, discussion and plan.  1. Hypertension,  essential        2. Moderate Alzheimer's dementia with anxiety, unspecified timing of dementia onset (CMS/HCC)        3. Type 2 diabetes mellitus without complication, without long-term current use of insulin (CMS/[facility])        4. Weakness

## 2023-07-22 PROBLEM — L98.9 SKIN DISORDER: Status: RESOLVED | Noted: 2023-01-19 | Resolved: 2023-07-22

## 2023-07-22 PROBLEM — R29.91: Status: RESOLVED | Noted: 2023-01-19 | Resolved: 2023-07-22

## 2023-07-22 PROBLEM — L91.8 SKIN TAG: Status: RESOLVED | Noted: 2023-01-19 | Resolved: 2023-07-22

## 2023-07-25 ENCOUNTER — NURSING HOME VISIT (OUTPATIENT)
Dept: POST ACUTE CARE | Facility: EXTERNAL LOCATION | Age: 73
End: 2023-07-25
Payer: COMMERCIAL

## 2023-07-25 DIAGNOSIS — F33.9 DEPRESSION, RECURRENT (CMS-HCC): ICD-10-CM

## 2023-07-25 DIAGNOSIS — I10 HYPERTENSION, ESSENTIAL: ICD-10-CM

## 2023-07-25 DIAGNOSIS — R53.1 WEAKNESS: ICD-10-CM

## 2023-07-25 DIAGNOSIS — D50.9 IRON DEFICIENCY ANEMIA, UNSPECIFIED IRON DEFICIENCY ANEMIA TYPE: Primary | ICD-10-CM

## 2023-07-25 PROCEDURE — 99309 SBSQ NF CARE MODERATE MDM 30: CPT | Performed by: INTERNAL MEDICINE

## 2023-07-25 NOTE — LETTER
Patient: Estella Lanese  : 1950    Encounter Date: 2023    PROGRESS NOTE    Subjective  Chief complaint: Estella M Lanese is a 73 y.o. female who is an acute skilled patient being seen and evaluated for weakness   General medical care and f/u.    HPI:  2023 patient admitted to skilled nursing facility for therapy and possible long-term care placement due to weakness and confusion.  Patient was at a another facility and was taken home by family.  She had fallen and was found by family with feces all over herself.  Patient was taken to the emergency room and work-up was unremarkable.  She was admitted for placement and was discharged to [facility].    2023 Patient continues working with therapy.  Patient is working on strengthening, transfers, and ADLs.  She is able to ambulate with walker with CGA.  Has no new concerns today.  Feeling OK.  Denies constitutional symptoms.  No new concerns per nursing.      2023 Patient is working with therapy and presents for follow-up.  Patient is working on strengthening, balance, transfers, and ADLs.  Uneventful night.  No new concerns per nursing staff.  Stable on current regime.  Denies nausea vomiting fever chills.     23  Patient working in therapy due to weakness and debility. Requires assistance for transfers, ADL's and mobility. Denies constitutional symptoms. No acute distress. Denies chest pain or headache.     23  Patient is stable without complaint.  Denies n/v/f/c.  Continues to work towards goals in therapy.  No new concerns reported by nursing.    23  patient   With dementia, mentation at baseline, continues to work in therapy.  Patient is working on strengthening, balance, for transfers and ADL retraining.   patient also working with speech therapy for cognition. Denies chest pain or headache.  Denies urinary frequency or increased thirst.   No new issues or concerns today.  No acute  distress.    7/25/23   Patient presents for general medical care and f/u.  Patient seen and examined at bedside.  No issues per nursing.  Patient has no acute complaints.    Anemia stable, denies fatigue, sob, and palpitations.  HTN BP at goal.  Denies chest pain and headache.    Patient with diagnosis of depression.  Mood is stable.  Denies feeling down and thoughts of harming self or others.  Patient working in therapy requires assistance for transfers ADLs and mobility.  No acute distress.          Objective  Vital signs:   123/74, 98%    Physical Exam  Constitutional:       General: She is not in acute distress.  Eyes:      Extraocular Movements: Extraocular movements intact.   Cardiovascular:      Rate and Rhythm: Normal rate and regular rhythm.   Pulmonary:      Effort: Pulmonary effort is normal.      Breath sounds: Normal breath sounds.   Abdominal:      General: Bowel sounds are normal.      Palpations: Abdomen is soft.   Musculoskeletal:      Cervical back: Neck supple.      Right lower leg: No edema.      Left lower leg: No edema.      Comments: Generalized weakness   Neurological:      Mental Status: She is alert.   Psychiatric:         Mood and Affect: Mood normal.         Behavior: Behavior is cooperative.         Assessment/Plan  Problem List Items Addressed This Visit          Cardiac and Vasculature    Hypertension, essential     Blood pressure at goal  Continue antihypertensives  Continue to monitor            Hematology and Neoplasia    Anemia, iron deficiency - Primary       Mental Health    Depression, recurrent (CMS/HCC)     Continue antidepressant.  Follow-up with psychiatrist.            Symptoms and Signs    Weakness     Working with therapy  Actively participating  Continue therapy        Medications, treatments, and labs reviewed  Continue medications and treatments as listed in EMR    Christopher Jaime MD  Scribe Attestation  By signing my name below, Sheba BREWER, [employer]   attest that  this documentation has been prepared under the direction and in the presence of Christopher Jaime MD.    Provider Attestation - Scribe documentation  All medical record entries made by the Scribe were at my direction and personally dictated by me. I have reviewed the chart and agree that the record accurately reflects my personal performance of the history, physical exam, discussion and plan.  1. Iron deficiency anemia, unspecified iron deficiency anemia type        2. Depression, recurrent (CMS/HCC)        3. Hypertension, essential        4. Weakness              Electronically Signed By: Christopher Jaime MD   7/25/23  5:49 PM

## 2023-07-25 NOTE — PROGRESS NOTES
PROGRESS NOTE    Subjective   Chief complaint: Estella M Lanese is a 73 y.o. female who is an acute skilled patient being seen and evaluated for weakness   General medical care and f/u.    HPI:  7/12/2023 patient admitted to skilled nursing facility for therapy and possible long-term care placement due to weakness and confusion.  Patient was at a another facility and was taken home by family.  She had fallen and was found by family with feces all over herself.  Patient was taken to the emergency room and work-up was unremarkable.  She was admitted for placement and was discharged to [facility].    7/13/2023 Patient continues working with therapy.  Patient is working on strengthening, transfers, and ADLs.  She is able to ambulate with walker with CGA.  Has no new concerns today.  Feeling OK.  Denies constitutional symptoms.  No new concerns per nursing.      7/17/2023 Patient is working with therapy and presents for follow-up.  Patient is working on strengthening, balance, transfers, and ADLs.  Uneventful night.  No new concerns per nursing staff.  Stable on current regime.  Denies nausea vomiting fever chills.     7/18/23  Patient working in therapy due to weakness and debility. Requires assistance for transfers, ADL's and mobility. Denies constitutional symptoms. No acute distress. Denies chest pain or headache.     7/19/23  Patient is stable without complaint.  Denies n/v/f/c.  Continues to work towards goals in therapy.  No new concerns reported by nursing.    7/21/23  patient   With dementia, mentation at baseline, continues to work in therapy.  Patient is working on strengthening, balance, for transfers and ADL retraining.   patient also working with speech therapy for cognition. Denies chest pain or headache.  Denies urinary frequency or increased thirst.   No new issues or concerns today.  No acute distress.    7/25/23   Patient presents for general medical care and f/u.  Patient  seen and examined at bedside.  No issues per nursing.  Patient has no acute complaints.    Anemia stable, denies fatigue, sob, and palpitations.  HTN BP at goal.  Denies chest pain and headache.    Patient with diagnosis of depression.  Mood is stable.  Denies feeling down and thoughts of harming self or others.  Patient working in therapy requires assistance for transfers ADLs and mobility.  No acute distress.          Objective   Vital signs:   123/74, 98%    Physical Exam  Constitutional:       General: She is not in acute distress.  Eyes:      Extraocular Movements: Extraocular movements intact.   Cardiovascular:      Rate and Rhythm: Normal rate and regular rhythm.   Pulmonary:      Effort: Pulmonary effort is normal.      Breath sounds: Normal breath sounds.   Abdominal:      General: Bowel sounds are normal.      Palpations: Abdomen is soft.   Musculoskeletal:      Cervical back: Neck supple.      Right lower leg: No edema.      Left lower leg: No edema.      Comments: Generalized weakness   Neurological:      Mental Status: She is alert.   Psychiatric:         Mood and Affect: Mood normal.         Behavior: Behavior is cooperative.         Assessment/Plan   Problem List Items Addressed This Visit          Cardiac and Vasculature    Hypertension, essential     Blood pressure at goal  Continue antihypertensives  Continue to monitor            Hematology and Neoplasia    Anemia, iron deficiency - Primary       Mental Health    Depression, recurrent (CMS/HCC)     Continue antidepressant.  Follow-up with psychiatrist.            Symptoms and Signs    Weakness     Working with therapy  Actively participating  Continue therapy        Medications, treatments, and labs reviewed  Continue medications and treatments as listed in EMR    Christopher Jaime MD  Scribe Attestation  By signing my name below, Sheba BREWER, Scribe   attest that this documentation has been prepared under the direction and in the presence of  Christopher Jaime MD.    Provider Attestation - Scribe documentation  All medical record entries made by the Scribe were at my direction and personally dictated by me. I have reviewed the chart and agree that the record accurately reflects my personal performance of the history, physical exam, discussion and plan.  1. Iron deficiency anemia, unspecified iron deficiency anemia type        2. Depression, recurrent (CMS/HCC)        3. Hypertension, essential        4. Weakness

## 2023-07-28 ENCOUNTER — NURSING HOME VISIT (OUTPATIENT)
Dept: POST ACUTE CARE | Facility: EXTERNAL LOCATION | Age: 73
End: 2023-07-28
Payer: COMMERCIAL

## 2023-07-28 DIAGNOSIS — I10 HYPERTENSION, ESSENTIAL: Primary | ICD-10-CM

## 2023-07-28 DIAGNOSIS — R53.1 WEAKNESS: ICD-10-CM

## 2023-07-28 PROCEDURE — 99308 SBSQ NF CARE LOW MDM 20: CPT | Performed by: INTERNAL MEDICINE

## 2023-07-28 NOTE — PROGRESS NOTES
PROGRESS NOTE    Subjective   Chief complaint: Estella M Lanese is a 73 y.o. female who is an acute skilled patient being seen and evaluated for weakness   General medical care and f/u.    HPI:  7/12/2023 patient admitted to skilled nursing facility for therapy and possible long-term care placement due to weakness and confusion.  Patient was at a another facility and was taken home by family.  She had fallen and was found by family with feces all over herself.  Patient was taken to the emergency room and work-up was unremarkable.  She was admitted for placement and was discharged to [facility].    7/13/2023 Patient continues working with therapy.  Patient is working on strengthening, transfers, and ADLs.  She is able to ambulate with walker with CGA.  Has no new concerns today.  Feeling OK.  Denies constitutional symptoms.  No new concerns per nursing.      7/17/2023 Patient is working with therapy and presents for follow-up.  Patient is working on strengthening, balance, transfers, and ADLs.  Uneventful night.  No new concerns per nursing staff.  Stable on current regime.  Denies nausea vomiting fever chills.     7/18/23  Patient working in therapy due to weakness and debility. Requires assistance for transfers, ADL's and mobility. Denies constitutional symptoms. No acute distress. Denies chest pain or headache.     7/19/23  Patient is stable without complaint.  Denies n/v/f/c.  Continues to work towards goals in therapy.  No new concerns reported by nursing.    7/21/23  patient   With dementia, mentation at baseline, continues to work in therapy.  Patient is working on strengthening, balance, for transfers and ADL retraining.   patient also working with speech therapy for cognition. Denies chest pain or headache.  Denies urinary frequency or increased thirst.   No new issues or concerns today.  No acute distress.    7/25/23   Patient presents for general medical care and f/u.  Patient  seen and examined at bedside.  No issues per nursing.  Patient has no acute complaints.    Anemia stable, denies fatigue, sob, and palpitations.  HTN BP at goal.  Denies chest pain and headache.    Patient with diagnosis of depression.  Mood is stable.  Denies feeling down and thoughts of harming self or others.  Patient working in therapy requires assistance for transfers ADLs and mobility.  No acute distress.    7/28/23 Patient working in therapy due to weakness. Requires assistance for transfers, ADLs and mobility. Denies chest pain or headache. No acute distress.           Objective   Vital signs:   125/73, 98%    Physical Exam  Constitutional:       General: She is not in acute distress.  Eyes:      Extraocular Movements: Extraocular movements intact.   Cardiovascular:      Rate and Rhythm: Normal rate and regular rhythm.   Pulmonary:      Effort: Pulmonary effort is normal.      Breath sounds: Normal breath sounds.   Abdominal:      General: Bowel sounds are normal.      Palpations: Abdomen is soft.   Musculoskeletal:      Cervical back: Neck supple.      Right lower leg: No edema.      Left lower leg: No edema.      Comments: Generalized weakness   Neurological:      Mental Status: She is alert.   Psychiatric:         Mood and Affect: Mood normal.         Behavior: Behavior is cooperative.         Assessment/Plan   Problem List Items Addressed This Visit       Hypertension, essential - Primary     Blood pressure at goal  Continue antihypertensives  Continue to monitor         Weakness     Working with therapy  Actively participating  Continue therapy        Medications, treatments, and labs reviewed  Continue medications and treatments as listed in EMR    Christopher Jaime MD  Scribe Attestation  By signing my name below, DANIELLA Sheba Garcia, Scribe   attest that this documentation has been prepared under the direction and in the presence of Christopher Jaime MD.    Provider Attestation - Scribe documentation  All  medical record entries made by the Scribe were at my direction and personally dictated by me. I have reviewed the chart and agree that the record accurately reflects my personal performance of the history, physical exam, discussion and plan.  1. Hypertension, essential        2. Weakness

## 2023-07-28 NOTE — LETTER
Patient: Estella Lanese  : 1950    Encounter Date: 2023    PROGRESS NOTE    Subjective  Chief complaint: Estella M Lanese is a 73 y.o. female who is an acute skilled patient being seen and evaluated for weakness   General medical care and f/u.    HPI:  2023 patient admitted to skilled nursing facility for therapy and possible long-term care placement due to weakness and confusion.  Patient was at a another facility and was taken home by family.  She had fallen and was found by family with feces all over herself.  Patient was taken to the emergency room and work-up was unremarkable.  She was admitted for placement and was discharged to [facility].    2023 Patient continues working with therapy.  Patient is working on strengthening, transfers, and ADLs.  She is able to ambulate with walker with CGA.  Has no new concerns today.  Feeling OK.  Denies constitutional symptoms.  No new concerns per nursing.      2023 Patient is working with therapy and presents for follow-up.  Patient is working on strengthening, balance, transfers, and ADLs.  Uneventful night.  No new concerns per nursing staff.  Stable on current regime.  Denies nausea vomiting fever chills.     23  Patient working in therapy due to weakness and debility. Requires assistance for transfers, ADL's and mobility. Denies constitutional symptoms. No acute distress. Denies chest pain or headache.     23  Patient is stable without complaint.  Denies n/v/f/c.  Continues to work towards goals in therapy.  No new concerns reported by nursing.    23  patient   With dementia, mentation at baseline, continues to work in therapy.  Patient is working on strengthening, balance, for transfers and ADL retraining.   patient also working with speech therapy for cognition. Denies chest pain or headache.  Denies urinary frequency or increased thirst.   No new issues or concerns today.  No acute  distress.    7/25/23   Patient presents for general medical care and f/u.  Patient seen and examined at bedside.  No issues per nursing.  Patient has no acute complaints.    Anemia stable, denies fatigue, sob, and palpitations.  HTN BP at goal.  Denies chest pain and headache.    Patient with diagnosis of depression.  Mood is stable.  Denies feeling down and thoughts of harming self or others.  Patient working in therapy requires assistance for transfers ADLs and mobility.  No acute distress.    7/28/23 Patient working in therapy due to weakness. Requires assistance for transfers, ADLs and mobility. Denies chest pain or headache. No acute distress.           Objective  Vital signs:   125/73, 98%    Physical Exam  Constitutional:       General: She is not in acute distress.  Eyes:      Extraocular Movements: Extraocular movements intact.   Cardiovascular:      Rate and Rhythm: Normal rate and regular rhythm.   Pulmonary:      Effort: Pulmonary effort is normal.      Breath sounds: Normal breath sounds.   Abdominal:      General: Bowel sounds are normal.      Palpations: Abdomen is soft.   Musculoskeletal:      Cervical back: Neck supple.      Right lower leg: No edema.      Left lower leg: No edema.      Comments: Generalized weakness   Neurological:      Mental Status: She is alert.   Psychiatric:         Mood and Affect: Mood normal.         Behavior: Behavior is cooperative.         Assessment/Plan  Problem List Items Addressed This Visit       Hypertension, essential - Primary     Blood pressure at goal  Continue antihypertensives  Continue to monitor         Weakness     Working with therapy  Actively participating  Continue therapy        Medications, treatments, and labs reviewed  Continue medications and treatments as listed in EMR    Christopher Jaime MD  Scribe Attestation  By signing my name below, ISheba, Scribjazzmine   attest that this documentation has been prepared under the direction and in the  presence of Christopher Jaime MD.    Provider Attestation - Scribe documentation  All medical record entries made by the Scribe were at my direction and personally dictated by me. I have reviewed the chart and agree that the record accurately reflects my personal performance of the history, physical exam, discussion and plan.  1. Hypertension, essential        2. Weakness              Electronically Signed By: Christopher Jaime MD   7/28/23  4:51 PM

## 2023-08-03 ENCOUNTER — NURSING HOME VISIT (OUTPATIENT)
Dept: POST ACUTE CARE | Facility: EXTERNAL LOCATION | Age: 73
End: 2023-08-03
Payer: COMMERCIAL

## 2023-08-03 DIAGNOSIS — F33.9 DEPRESSION, RECURRENT (CMS-HCC): ICD-10-CM

## 2023-08-03 DIAGNOSIS — R45.89 FEELING DOWN: ICD-10-CM

## 2023-08-03 DIAGNOSIS — G30.9 MODERATE ALZHEIMER'S DEMENTIA WITH ANXIETY, UNSPECIFIED TIMING OF DEMENTIA ONSET (MULTI): ICD-10-CM

## 2023-08-03 DIAGNOSIS — F02.B4 MODERATE ALZHEIMER'S DEMENTIA WITH ANXIETY, UNSPECIFIED TIMING OF DEMENTIA ONSET (MULTI): ICD-10-CM

## 2023-08-03 DIAGNOSIS — I10 HYPERTENSION, ESSENTIAL: ICD-10-CM

## 2023-08-03 DIAGNOSIS — R41.0 CONFUSION: Primary | ICD-10-CM

## 2023-08-03 PROCEDURE — 99308 SBSQ NF CARE LOW MDM 20: CPT | Performed by: NURSE PRACTITIONER

## 2023-08-03 NOTE — LETTER
Patient: Estella Lanese  : 1950    Encounter Date: 2023    PROGRESS NOTE    Subjective  Chief complaint: Estella M Lanese is a 73 y.o. female who is a long term care patient being seen and evaluated for increased confusion.    HPI:   Nurse reporting patient with increased confusion and tearful.  Patient states she is feeling down but unable to specify.  Denies thoughts of harming herself.  Denies n/v/f/c and pain.  No further concerns.      Objective  Vital signs:  18, 125/66, 98.2, 85, 96%,   Physical Exam  Constitutional:       General: She is not in acute distress.  Eyes:      Extraocular Movements: Extraocular movements intact.   Cardiovascular:      Rate and Rhythm: Normal rate and regular rhythm.   Pulmonary:      Effort: Pulmonary effort is normal.      Breath sounds: Normal breath sounds.   Abdominal:      General: Bowel sounds are normal.      Palpations: Abdomen is soft.   Musculoskeletal:      Cervical back: Neck supple.      Comments: Generalized weakness   Neurological:      Mental Status: She is alert.   Psychiatric:         Mood and Affect: Mood normal.         Behavior: Behavior is cooperative.         Assessment/Plan  Problem List Items Addressed This Visit       Confusion - Primary     Obtain BMP CBC         Depression, recurrent (CMS/HCC)     Continue antidepressant  Follow-up with psychiatrist         Feeling down     Continue antidepressant  Follow-up with psychiatrist         Hypertension, essential     Blood pressure at goal  Continue antihypertensives  Continue to monitor         Moderate Alzheimer's dementia with anxiety, unspecified timing of dementia onset (CMS/HCC)     Increased confusion and tearfulness   Obtain labs           Medications, treatments, and labs reviewed  Continue medications and treatments as listed in EMR    Scribe Attestation  DANIELLA, Kellen Shen   attest that this documentation has been prepared under the direction and in the presence of Jeanette  SHERRI Arzate    Provider Attestation - Scribe documentation  All medical record entries made by the Scribe were at my direction and personally dictated by me. I have reviewed the chart and agree that the record accurately reflects my personal performance of the history, physical exam, discussion and plan.   SHERRI Cherry        Electronically Signed By: SHERRI Cherry   8/13/23 10:54 AM

## 2023-08-04 ENCOUNTER — NURSING HOME VISIT (OUTPATIENT)
Dept: POST ACUTE CARE | Facility: EXTERNAL LOCATION | Age: 73
End: 2023-08-04
Payer: COMMERCIAL

## 2023-08-04 DIAGNOSIS — G30.9 MODERATE ALZHEIMER'S DEMENTIA WITH ANXIETY, UNSPECIFIED TIMING OF DEMENTIA ONSET (MULTI): Primary | ICD-10-CM

## 2023-08-04 DIAGNOSIS — F02.B4 MODERATE ALZHEIMER'S DEMENTIA WITH ANXIETY, UNSPECIFIED TIMING OF DEMENTIA ONSET (MULTI): Primary | ICD-10-CM

## 2023-08-04 DIAGNOSIS — I10 HYPERTENSION, ESSENTIAL: ICD-10-CM

## 2023-08-04 PROCEDURE — 99308 SBSQ NF CARE LOW MDM 20: CPT | Performed by: INTERNAL MEDICINE

## 2023-08-04 NOTE — PROGRESS NOTES
PROGRESS NOTE    Subjective   Chief complaint: Estella M Lanese is a 73 y.o. female who is a long term care patient being seen and evaluated for increased confusion.    HPI:   Nurse reporting patient with increased confusion and tearful.  Patient states she is feeling down but unable to specify.  Denies thoughts of harming herself.  Denies n/v/f/c and pain.  No further concerns.      Objective   Vital signs:  18, 125/66, 98.2, 85, 96%,   Physical Exam  Constitutional:       General: She is not in acute distress.  Eyes:      Extraocular Movements: Extraocular movements intact.   Cardiovascular:      Rate and Rhythm: Normal rate and regular rhythm.   Pulmonary:      Effort: Pulmonary effort is normal.      Breath sounds: Normal breath sounds.   Abdominal:      General: Bowel sounds are normal.      Palpations: Abdomen is soft.   Musculoskeletal:      Cervical back: Neck supple.      Comments: Generalized weakness   Neurological:      Mental Status: She is alert.   Psychiatric:         Mood and Affect: Mood normal.         Behavior: Behavior is cooperative.         Assessment/Plan   Problem List Items Addressed This Visit       Confusion - Primary     Obtain BMP CBC         Depression, recurrent (CMS/HCC)     Continue antidepressant  Follow-up with psychiatrist         Feeling down     Continue antidepressant  Follow-up with psychiatrist         Hypertension, essential     Blood pressure at goal  Continue antihypertensives  Continue to monitor         Moderate Alzheimer's dementia with anxiety, unspecified timing of dementia onset (CMS/HCC)     Increased confusion and tearfulness   Obtain labs           Medications, treatments, and labs reviewed  Continue medications and treatments as listed in EMR    Scribe Attestation  Sylvie BREWER Scribe   attest that this documentation has been prepared under the direction and in the presence of BRENDA Cherry-CNP    Provider Attestation - Scribe  documentation  All medical record entries made by the Scribe were at my direction and personally dictated by me. I have reviewed the chart and agree that the record accurately reflects my personal performance of the history, physical exam, discussion and plan.   Jeanette Arellano, APRN-CNP

## 2023-08-04 NOTE — LETTER
Patient: Estella Lanese  : 1950    Encounter Date: 2023    PROGRESS NOTE    Subjective  Chief complaint: Estella M Lanese is a 73 y.o. female who is a long term care patient being seen and evaluated for confusion    HPI:  Patient presented with c/o increased confusion. Denies constitutional symptoms. No other concerns today. Labs ordered and pending. Denies chest pain or headache.       Objective  Vital signs: 123/76, 981%    Physical Exam  Constitutional:       General: She is not in acute distress.  Eyes:      Extraocular Movements: Extraocular movements intact.   Cardiovascular:      Rate and Rhythm: Normal rate and regular rhythm.   Pulmonary:      Effort: Pulmonary effort is normal.      Breath sounds: Normal breath sounds.   Abdominal:      General: Bowel sounds are normal.      Palpations: Abdomen is soft.   Musculoskeletal:      Cervical back: Neck supple.      Right lower leg: No edema.      Left lower leg: No edema.   Neurological:      Mental Status: She is alert.   Psychiatric:         Mood and Affect: Mood normal.         Behavior: Behavior is cooperative.         Assessment/Plan  Problem List Items Addressed This Visit       Hypertension, essential     Blood pressure at goal  Continue antihypertensives  Continue to monitor         Moderate Alzheimer's dementia with anxiety, unspecified timing of dementia onset (CMS/[facility]) - Primary     Increased confusion  Labs ordered and pending          Medications, treatments, and labs reviewed  Continue medications and treatments as listed in [facility]    Scribe Attestation  By signing my name below, I, Kellen Vega   attest that this documentation has been prepared under the direction and in the presence of Christopher Jaime MD.    Provider Attestation - Scribe documentation  All medical record entries made by the Scribe were at my direction and personally dictated by me. I have reviewed the chart and agree that the record accurately reflects my  personal performance of the history, physical exam, discussion and plan.    1. Moderate Alzheimer's dementia with anxiety, unspecified timing of dementia onset (CMS/[facility])        2. Hypertension, essential               Electronically Signed By: Christopher Jaime MD   8/6/23  8:34 AM

## 2023-08-11 ENCOUNTER — NURSING HOME VISIT (OUTPATIENT)
Dept: POST ACUTE CARE | Facility: EXTERNAL LOCATION | Age: 73
End: 2023-08-11
Payer: COMMERCIAL

## 2023-08-11 DIAGNOSIS — I10 HYPERTENSION, ESSENTIAL: ICD-10-CM

## 2023-08-11 DIAGNOSIS — F02.B4 MODERATE ALZHEIMER'S DEMENTIA WITH ANXIETY, UNSPECIFIED TIMING OF DEMENTIA ONSET (MULTI): Primary | ICD-10-CM

## 2023-08-11 DIAGNOSIS — G30.9 MODERATE ALZHEIMER'S DEMENTIA WITH ANXIETY, UNSPECIFIED TIMING OF DEMENTIA ONSET (MULTI): Primary | ICD-10-CM

## 2023-08-11 PROCEDURE — 99308 SBSQ NF CARE LOW MDM 20: CPT | Performed by: INTERNAL MEDICINE

## 2023-08-11 NOTE — PROGRESS NOTES
PROGRESS NOTE    Subjective   Chief complaint: Estella M Lanese is a 73 y.o. female who is a long term care patient being seen and evaluated for ALZ/demntia    HPI:   patient with ALZ/dementia had a recent labs due to increased confusion and tearfulness.   labs unremarkable, patient mentation improved.  No further issues or concerns.  No acute distress.  denies chest pain or headache.      Objective   Vital signs:  122/76, 98%    Physical Exam  Constitutional:       General: She is not in acute distress.  Eyes:      Extraocular Movements: Extraocular movements intact.   Cardiovascular:      Rate and Rhythm: Normal rate and regular rhythm.   Pulmonary:      Effort: Pulmonary effort is normal.      Breath sounds: Normal breath sounds.   Abdominal:      General: Bowel sounds are normal.      Palpations: Abdomen is soft.   Musculoskeletal:      Cervical back: Neck supple.      Right lower leg: No edema.      Left lower leg: No edema.   Neurological:      Mental Status: She is alert.   Psychiatric:         Mood and Affect: Mood normal.         Behavior: Behavior is cooperative.         Assessment/Plan   Problem List Items Addressed This Visit       Hypertension, essential     Blood pressure at goal  Continue antihypertensives  Continue to monitor         Moderate Alzheimer's dementia with anxiety, unspecified timing of dementia onset (CMS/[facility]) - Primary       Increased confusion and tearfulness improved   labs unremarkable          Medications, treatments, and labs reviewed  Continue medications and treatments as listed in [facility]    Scribe Attestation  By signing my name below, I, Kellen Vega   attest that this documentation has been prepared under the direction and in the presence of Christopher Jaime MD.    Provider Attestation - Scribe documentation  All medical record entries made by the Scribe were at my direction and personally dictated by me. I have reviewed the chart and agree that the record accurately  reflects my personal performance of the history, physical exam, discussion and plan.    1. Moderate Alzheimer's dementia with anxiety, unspecified timing of dementia onset (CMS/[facility])        2. Hypertension, essential

## 2023-08-11 NOTE — LETTER
Patient: Estella Lanese  : 1950    Encounter Date: 2023    PROGRESS NOTE    Subjective  Chief complaint: Estella M Lanese is a 73 y.o. female who is a long term care patient being seen and evaluated for ALZ/demntia    HPI:   patient with ALZ/dementia had a recent labs due to increased confusion and tearfulness.   labs unremarkable, patient mentation improved.  No further issues or concerns.  No acute distress.  denies chest pain or headache.      Objective  Vital signs:  122/76, 98%    Physical Exam  Constitutional:       General: She is not in acute distress.  Eyes:      Extraocular Movements: Extraocular movements intact.   Cardiovascular:      Rate and Rhythm: Normal rate and regular rhythm.   Pulmonary:      Effort: Pulmonary effort is normal.      Breath sounds: Normal breath sounds.   Abdominal:      General: Bowel sounds are normal.      Palpations: Abdomen is soft.   Musculoskeletal:      Cervical back: Neck supple.      Right lower leg: No edema.      Left lower leg: No edema.   Neurological:      Mental Status: She is alert.   Psychiatric:         Mood and Affect: Mood normal.         Behavior: Behavior is cooperative.         Assessment/Plan  Problem List Items Addressed This Visit       Hypertension, essential     Blood pressure at goal  Continue antihypertensives  Continue to monitor         Moderate Alzheimer's dementia with anxiety, unspecified timing of dementia onset (CMS/[facility]) - Primary       Increased confusion and tearfulness improved   labs unremarkable          Medications, treatments, and labs reviewed  Continue medications and treatments as listed in [facility]    Scribe Attestation  By signing my name below, Sheba BREWER Scribe   attest that this documentation has been prepared under the direction and in the presence of Christopher Jaime MD.    Provider Attestation - Scribe documentation  All medical record entries made by the Scribe were at my direction and personally dictated by  me. I have reviewed the chart and agree that the record accurately reflects my personal performance of the history, physical exam, discussion and plan.    1. Moderate Alzheimer's dementia with anxiety, unspecified timing of dementia onset (CMS/[facility])        2. Hypertension, essential               Electronically Signed By: Christopher Jaime MD   8/11/23  2:36 PM

## 2023-08-13 PROBLEM — R45.89 FEELING DOWN: Status: ACTIVE | Noted: 2023-08-13

## 2023-08-13 PROBLEM — R41.0 CONFUSION: Status: ACTIVE | Noted: 2023-08-13

## 2023-08-22 ENCOUNTER — NURSING HOME VISIT (OUTPATIENT)
Dept: POST ACUTE CARE | Facility: EXTERNAL LOCATION | Age: 73
End: 2023-08-22
Payer: COMMERCIAL

## 2023-08-22 DIAGNOSIS — F02.B4 MODERATE ALZHEIMER'S DEMENTIA WITH ANXIETY, UNSPECIFIED TIMING OF DEMENTIA ONSET (MULTI): ICD-10-CM

## 2023-08-22 DIAGNOSIS — G30.9 MODERATE ALZHEIMER'S DEMENTIA WITH ANXIETY, UNSPECIFIED TIMING OF DEMENTIA ONSET (MULTI): ICD-10-CM

## 2023-08-22 DIAGNOSIS — E11.9 TYPE 2 DIABETES MELLITUS WITHOUT COMPLICATION, WITHOUT LONG-TERM CURRENT USE OF INSULIN (MULTI): ICD-10-CM

## 2023-08-22 DIAGNOSIS — I10 HYPERTENSION, ESSENTIAL: ICD-10-CM

## 2023-08-22 PROCEDURE — 99309 SBSQ NF CARE MODERATE MDM 30: CPT | Performed by: INTERNAL MEDICINE

## 2023-08-22 NOTE — PROGRESS NOTES
PROGRESS NOTE    Subjective   Chief complaint: Estella M Lanese is a 73 y.o. female who is a long term care patient being seen and evaluated for monthly general medical care and follow-up    HPI:  Patient presents for general medical care and f/u.  Patient seen and examined at bedside. Patient has dementia and requires assistance with ADLs. T2DM, Patient denies vision changes, excessive thirst, sweating, urinary frequency.  HTN, denies fatigue, sob, and palpitations. FU increase in confusion and tearfulness, awaiting UA results.  No issues per nursing.  Patient has no acute complaints.       Objective   Vital signs: 121/62,70,98.0,18,96% RA,     Physical Exam  Constitutional:       General: She is not in acute distress.  Eyes:      Extraocular Movements: Extraocular movements intact.   Cardiovascular:      Rate and Rhythm: Normal rate and regular rhythm.   Pulmonary:      Effort: Pulmonary effort is normal.      Breath sounds: Normal breath sounds.   Abdominal:      General: Bowel sounds are normal.      Palpations: Abdomen is soft.   Musculoskeletal:      Cervical back: Neck supple.      Right lower leg: No edema.      Left lower leg: No edema.   Neurological:      Mental Status: She is alert.   Psychiatric:         Mood and Affect: Mood normal.         Behavior: Behavior is cooperative.         Assessment/Plan   Problem List Items Addressed This Visit       Type 2 diabetes mellitus without complication, without long-term current use of insulin (CMS/[facility])     3/3/2023 A1c 5.3  FBG at goal  Not currently on any medications for diabetes  Monitor         Hypertension, essential     Blood pressure at goal  Continue antihypertensives  Continue to monitor         Moderate Alzheimer's dementia with anxiety, unspecified timing of dementia onset (CMS/[facility])      Increased confusion and tearfulness improved   labs unremarkable, awaiting UA          Medications, treatments, and labs reviewed  Continue medications and  treatments as listed in EMR      Scribe Attestation  IJessica Scribe   attest that this documentation has been prepared under the direction and in the presence of Christopher Jaime MD.     Provider Attestation - Scribe documentation  All medical record entries made by the Scribe were at my direction and personally dictated by me. I have reviewed the chart and agree that the record accurately reflects my personal performance of the history, physical exam, discussion and plan.   Christopher Jaime MD

## 2023-08-22 NOTE — LETTER
Patient: Estella Lanese  : 1950    Encounter Date: 2023    PROGRESS NOTE    Subjective  Chief complaint: Estella M Lanese is a 73 y.o. female who is a long term care patient being seen and evaluated for monthly general medical care and follow-up    HPI:  Patient presents for general medical care and f/u.  Patient seen and examined at bedside. Patient has dementia and requires assistance with ADLs. T2DM, Patient denies vision changes, excessive thirst, sweating, urinary frequency.  HTN, denies fatigue, sob, and palpitations. FU increase in confusion and tearfulness, awaiting UA results.  No issues per nursing.  Patient has no acute complaints.       Objective  Vital signs: 121/62,70,98.0,18,96% RA,     Physical Exam  Constitutional:       General: She is not in acute distress.  Eyes:      Extraocular Movements: Extraocular movements intact.   Cardiovascular:      Rate and Rhythm: Normal rate and regular rhythm.   Pulmonary:      Effort: Pulmonary effort is normal.      Breath sounds: Normal breath sounds.   Abdominal:      General: Bowel sounds are normal.      Palpations: Abdomen is soft.   Musculoskeletal:      Cervical back: Neck supple.      Right lower leg: No edema.      Left lower leg: No edema.   Neurological:      Mental Status: She is alert.   Psychiatric:         Mood and Affect: Mood normal.         Behavior: Behavior is cooperative.         Assessment/Plan  Problem List Items Addressed This Visit       Type 2 diabetes mellitus without complication, without long-term current use of insulin (CMS/[facility])     3/3/2023 A1c 5.3  FBG at goal  Not currently on any medications for diabetes  Monitor         Hypertension, essential     Blood pressure at goal  Continue antihypertensives  Continue to monitor         Moderate Alzheimer's dementia with anxiety, unspecified timing of dementia onset (CMS/[facility])      Increased confusion and tearfulness improved   labs unremarkable, awaiting UA           Medications, treatments, and labs reviewed  Continue medications and treatments as listed in EMR      Scribe Attestation  I, Kellen Blum   attest that this documentation has been prepared under the direction and in the presence of Christopher Jaime MD.     Provider Attestation - Scribe documentation  All medical record entries made by the Scribe were at my direction and personally dictated by me. I have reviewed the chart and agree that the record accurately reflects my personal performance of the history, physical exam, discussion and plan.   Christopher Jaime MD      Electronically Signed By: Christopher Jaime MD   8/22/23  5:59 PM

## 2023-09-22 ENCOUNTER — NURSING HOME VISIT (OUTPATIENT)
Dept: POST ACUTE CARE | Facility: EXTERNAL LOCATION | Age: 73
End: 2023-09-22
Payer: COMMERCIAL

## 2023-09-22 DIAGNOSIS — I10 HYPERTENSION, ESSENTIAL: ICD-10-CM

## 2023-09-22 DIAGNOSIS — F32.A DEPRESSION, UNSPECIFIED DEPRESSION TYPE: Primary | ICD-10-CM

## 2023-09-22 PROCEDURE — 99308 SBSQ NF CARE LOW MDM 20: CPT | Performed by: INTERNAL MEDICINE

## 2023-09-22 NOTE — LETTER
Patient: Estella Lanese  : 1950    Encounter Date: 2023    PROGRESS NOTE    Subjective  Chief complaint: Estella M Lanese is a 73 y.o. female who is a long term care patient being seen and evaluated for  depression    HPI:    I was asked to See patient and evaluate continued use of Cymbalta.  Patient with diagnosis of depression.  Mood is stable.  Denies feeling down and thoughts of harming self or others. .  Denies chest pain or headache.  No acute distress.      Objective  Vital signs: , 129/76, 98%    Physical Exam  Constitutional:       General: She is not in acute distress.  Eyes:      Extraocular Movements: Extraocular movements intact.   Cardiovascular:      Rate and Rhythm: Normal rate and regular rhythm.   Pulmonary:      Effort: Pulmonary effort is normal.      Breath sounds: Normal breath sounds.   Abdominal:      General: Bowel sounds are normal.      Palpations: Abdomen is soft.   Musculoskeletal:      Cervical back: Neck supple.      Right lower leg: No edema.      Left lower leg: No edema.   Neurological:      Mental Status: She is alert.   Psychiatric:         Mood and Affect: Mood normal.         Behavior: Behavior is cooperative.         Assessment/Plan  Problem List Items Addressed This Visit       Depression - Primary     .  Continue Cymbalta   monitor for changes in mood         Hypertension, essential     Blood pressure at goal  Continue antihypertensives  Continue to monitor          Medications, treatments, and labs reviewed  Continue medications and treatments as listed in [facility]    Scribe Attestation  By signing my name below, ISheba Scribe   attest that this documentation has been prepared under the direction and in the presence of Christopher Jaime MD.    Provider Attestation - Scribe documentation  All medical record entries made by the Gretelibe were at my direction and personally dictated by me. I have reviewed the chart and agree that the record accurately reflects my  personal performance of the history, physical exam, discussion and plan.    1. Depression, unspecified depression type        2. Hypertension, essential               Electronically Signed By: Christopher Jaime MD   9/25/23  1:26 PM

## 2023-09-25 PROBLEM — F32.A DEPRESSION: Status: ACTIVE | Noted: 2023-01-19

## 2023-09-25 NOTE — PROGRESS NOTES
PROGRESS NOTE    Subjective   Chief complaint: Estella M Lanese is a 73 y.o. female who is a long term care patient being seen and evaluated for  depression    HPI:    I was asked to See patient and evaluate continued use of Cymbalta.  Patient with diagnosis of depression.  Mood is stable.  Denies feeling down and thoughts of harming self or others. .  Denies chest pain or headache.  No acute distress.      Objective   Vital signs: , 129/76, 98%    Physical Exam  Constitutional:       General: She is not in acute distress.  Eyes:      Extraocular Movements: Extraocular movements intact.   Cardiovascular:      Rate and Rhythm: Normal rate and regular rhythm.   Pulmonary:      Effort: Pulmonary effort is normal.      Breath sounds: Normal breath sounds.   Abdominal:      General: Bowel sounds are normal.      Palpations: Abdomen is soft.   Musculoskeletal:      Cervical back: Neck supple.      Right lower leg: No edema.      Left lower leg: No edema.   Neurological:      Mental Status: She is alert.   Psychiatric:         Mood and Affect: Mood normal.         Behavior: Behavior is cooperative.         Assessment/Plan   Problem List Items Addressed This Visit       Depression - Primary     .  Continue Cymbalta   monitor for changes in mood         Hypertension, essential     Blood pressure at goal  Continue antihypertensives  Continue to monitor          Medications, treatments, and labs reviewed  Continue medications and treatments as listed in [facility]    Scribe Attestation  By signing my name below, I, Kellen Vega   attest that this documentation has been prepared under the direction and in the presence of Christopher Jaime MD.    Provider Attestation - Scribe documentation  All medical record entries made by the Scribe were at my direction and personally dictated by me. I have reviewed the chart and agree that the record accurately reflects my personal performance of the history, physical exam, discussion and  plan.    1. Depression, unspecified depression type        2. Hypertension, essential

## 2023-09-26 ENCOUNTER — NURSING HOME VISIT (OUTPATIENT)
Dept: POST ACUTE CARE | Facility: EXTERNAL LOCATION | Age: 73
End: 2023-09-26
Payer: COMMERCIAL

## 2023-09-26 DIAGNOSIS — E11.9 TYPE 2 DIABETES MELLITUS WITHOUT COMPLICATION, WITHOUT LONG-TERM CURRENT USE OF INSULIN (MULTI): ICD-10-CM

## 2023-09-26 DIAGNOSIS — G30.9 MODERATE ALZHEIMER'S DEMENTIA WITH ANXIETY, UNSPECIFIED TIMING OF DEMENTIA ONSET (MULTI): ICD-10-CM

## 2023-09-26 DIAGNOSIS — F02.B4 MODERATE ALZHEIMER'S DEMENTIA WITH ANXIETY, UNSPECIFIED TIMING OF DEMENTIA ONSET (MULTI): ICD-10-CM

## 2023-09-26 DIAGNOSIS — F32.A DEPRESSION, UNSPECIFIED DEPRESSION TYPE: ICD-10-CM

## 2023-09-26 DIAGNOSIS — I10 HYPERTENSION, ESSENTIAL: ICD-10-CM

## 2023-09-26 PROCEDURE — 99309 SBSQ NF CARE MODERATE MDM 30: CPT | Performed by: INTERNAL MEDICINE

## 2023-09-26 NOTE — PROGRESS NOTES
PROGRESS NOTE    Subjective   Chief complaint: Estella M Lanese is a 73 y.o. female who is a long term care patient being seen and evaluated for monthly general medical care and follow-up.    HPI:  Patient presents for general medical care and f/u.  Patient seen and examined at bedside. Patient has dementia and requires assistance with ADLs. T2DM, Patient denies vision changes, excessive thirst, sweating, urinary frequency.  HTN, denies fatigue, sob, and palpitations. Patient states her depression has improved. No issues per nursing.  Patient has no acute complaints.       Objective   Vital signs: 121/62,70,96%    Physical Exam  Constitutional:       General: She is not in acute distress.  Eyes:      Extraocular Movements: Extraocular movements intact.   Cardiovascular:      Rate and Rhythm: Normal rate and regular rhythm.   Pulmonary:      Effort: Pulmonary effort is normal.      Breath sounds: Normal breath sounds.   Abdominal:      General: Bowel sounds are normal.      Palpations: Abdomen is soft.   Musculoskeletal:      Cervical back: Neck supple.      Right lower leg: No edema.      Left lower leg: No edema.   Neurological:      Mental Status: She is alert.   Psychiatric:         Mood and Affect: Mood normal.         Behavior: Behavior is cooperative.         Assessment/Plan   Problem List Items Addressed This Visit       Depression     Continue Cymbalta  monitor for changes in mood         Type 2 diabetes mellitus without complication, without long-term current use of insulin (CMS/[facility])     Glucoscan         Hypertension, essential     Blood pressure at goal  Continue antihypertensives  Continue to monitor         Moderate Alzheimer's dementia with anxiety, unspecified timing of dementia onset (CMS/[facility])     Mentation at baseline  Monitor safety  Monitor for changes in behavior          Medications, treatments, and labs reviewed  Continue medications and treatments as listed in EMR      Scribe Attestation  I,  Gretel Blumibe   attest that this documentation has been prepared under the direction and in the presence of Christopher Jaime MD.     Provider Attestation - Scribe documentation  All medical record entries made by the Scribe were at my direction and personally dictated by me. I have reviewed the chart and agree that the record accurately reflects my personal performance of the history, physical exam, discussion and plan.   Christopher Jaime MD

## 2023-09-26 NOTE — LETTER
Patient: Estella Lanese  : 1950    Encounter Date: 2023    PROGRESS NOTE    Subjective  Chief complaint: Estella M Lanese is a 73 y.o. female who is a long term care patient being seen and evaluated for monthly general medical care and follow-up.    HPI:  Patient presents for general medical care and f/u.  Patient seen and examined at bedside. Patient has dementia and requires assistance with ADLs. T2DM, Patient denies vision changes, excessive thirst, sweating, urinary frequency.  HTN, denies fatigue, sob, and palpitations. Patient states her depression has improved. No issues per nursing.  Patient has no acute complaints.       Objective  Vital signs: 121/62,70,96%    Physical Exam  Constitutional:       General: She is not in acute distress.  Eyes:      Extraocular Movements: Extraocular movements intact.   Cardiovascular:      Rate and Rhythm: Normal rate and regular rhythm.   Pulmonary:      Effort: Pulmonary effort is normal.      Breath sounds: Normal breath sounds.   Abdominal:      General: Bowel sounds are normal.      Palpations: Abdomen is soft.   Musculoskeletal:      Cervical back: Neck supple.      Right lower leg: No edema.      Left lower leg: No edema.   Neurological:      Mental Status: She is alert.   Psychiatric:         Mood and Affect: Mood normal.         Behavior: Behavior is cooperative.         Assessment/Plan  Problem List Items Addressed This Visit       Depression     Continue Cymbalta  monitor for changes in mood         Type 2 diabetes mellitus without complication, without long-term current use of insulin (CMS/[facility])     Glucoscan         Hypertension, essential     Blood pressure at goal  Continue antihypertensives  Continue to monitor         Moderate Alzheimer's dementia with anxiety, unspecified timing of dementia onset (CMS/[facility])     Mentation at baseline  Monitor safety  Monitor for changes in behavior          Medications, treatments, and labs reviewed  Continue  medications and treatments as listed in EMR      Scribe Attestation  DANIELLA, Kellen Blum   attest that this documentation has been prepared under the direction and in the presence of Christopher Jaime MD.     Provider Attestation - Scribe documentation  All medical record entries made by the Scribe were at my direction and personally dictated by me. I have reviewed the chart and agree that the record accurately reflects my personal performance of the history, physical exam, discussion and plan.   Christopher Jaime MD      Electronically Signed By: Christopher Jaime MD   9/26/23  4:32 PM

## 2023-10-03 ENCOUNTER — NURSING HOME VISIT (OUTPATIENT)
Dept: POST ACUTE CARE | Facility: EXTERNAL LOCATION | Age: 73
End: 2023-10-03
Payer: COMMERCIAL

## 2023-10-03 DIAGNOSIS — I10 HYPERTENSION, ESSENTIAL: ICD-10-CM

## 2023-10-03 DIAGNOSIS — G30.9 MODERATE ALZHEIMER'S DEMENTIA WITH ANXIETY, UNSPECIFIED TIMING OF DEMENTIA ONSET (MULTI): ICD-10-CM

## 2023-10-03 DIAGNOSIS — U07.1 COVID: ICD-10-CM

## 2023-10-03 DIAGNOSIS — F02.B4 MODERATE ALZHEIMER'S DEMENTIA WITH ANXIETY, UNSPECIFIED TIMING OF DEMENTIA ONSET (MULTI): ICD-10-CM

## 2023-10-03 DIAGNOSIS — E11.9 TYPE 2 DIABETES MELLITUS WITHOUT COMPLICATION, WITHOUT LONG-TERM CURRENT USE OF INSULIN (MULTI): ICD-10-CM

## 2023-10-03 PROCEDURE — 99309 SBSQ NF CARE MODERATE MDM 30: CPT | Performed by: INTERNAL MEDICINE

## 2023-10-03 NOTE — LETTER
Patient: Estella Lanese  : 1950    Encounter Date: 10/03/2023    PROGRESS NOTE    Subjective  Chief complaint: Estella M Lanese is a 73 y.o. female who is a long term care patient being seen and evaluated for change in MS.     HPI:  Patient seen and examined at bedside. Patient currently COVID positive, with increase in lethargic, increase in confusion and generally not feeling well. Patient labs unremarkable. No new nursing concerns.       Objective  Vital signs: 134/52,81,95%,BS 96    Physical Exam  Constitutional:       General: She is not in acute distress.  Eyes:      Extraocular Movements: Extraocular movements intact.   Cardiovascular:      Rate and Rhythm: Normal rate and regular rhythm.   Pulmonary:      Effort: Pulmonary effort is normal.      Breath sounds: Normal breath sounds.   Abdominal:      General: Bowel sounds are normal.      Palpations: Abdomen is soft.   Musculoskeletal:      Cervical back: Neck supple.      Right lower leg: No edema.      Left lower leg: No edema.   Neurological:      Mental Status: She is alert.   Psychiatric:         Mood and Affect: Mood normal.         Behavior: Behavior is cooperative.         Assessment/Plan  Problem List Items Addressed This Visit       Type 2 diabetes mellitus without complication, without long-term current use of insulin (CMS/[facility])     Glucoscan         Hypertension, essential     Blood pressure at goal  Continue antihypertensives  Continue to monitor         Moderate Alzheimer's dementia with anxiety, unspecified timing of dementia onset (CMS/[facility])     Mentation at baseline  Monitor safety  Monitor for changes in behavior         COVID     Continue meds  Monitor symptoms            Medications, treatments, and labs reviewed  Continue medications and treatments as listed in EMR      Scribe Attestation  DANIELLA, Kellen Blum   attest that this documentation has been prepared under the direction and in the presence of Christopher Jaime MD.      Provider Attestation - Scribe documentation  All medical record entries made by the Scribe were at my direction and personally dictated by me. I have reviewed the chart and agree that the record accurately reflects my personal performance of the history, physical exam, discussion and plan.   Christopher Jaime MD      Electronically Signed By: Christopher Jaime MD   10/3/23  3:37 PM

## 2023-10-03 NOTE — PROGRESS NOTES
PROGRESS NOTE    Subjective   Chief complaint: Estella M Lanese is a 73 y.o. female who is a long term care patient being seen and evaluated for change in MS.     HPI:  Patient seen and examined at bedside. Patient currently COVID positive, with increase in lethargic, increase in confusion and generally not feeling well. Patient labs unremarkable. No new nursing concerns.       Objective   Vital signs: 134/52,81,95%,BS 96    Physical Exam  Constitutional:       General: She is not in acute distress.  Eyes:      Extraocular Movements: Extraocular movements intact.   Cardiovascular:      Rate and Rhythm: Normal rate and regular rhythm.   Pulmonary:      Effort: Pulmonary effort is normal.      Breath sounds: Normal breath sounds.   Abdominal:      General: Bowel sounds are normal.      Palpations: Abdomen is soft.   Musculoskeletal:      Cervical back: Neck supple.      Right lower leg: No edema.      Left lower leg: No edema.   Neurological:      Mental Status: She is alert.   Psychiatric:         Mood and Affect: Mood normal.         Behavior: Behavior is cooperative.         Assessment/Plan   Problem List Items Addressed This Visit       Type 2 diabetes mellitus without complication, without long-term current use of insulin (CMS/[facility])     Glucoscan         Hypertension, essential     Blood pressure at goal  Continue antihypertensives  Continue to monitor         Moderate Alzheimer's dementia with anxiety, unspecified timing of dementia onset (CMS/[facility])     Mentation at baseline  Monitor safety  Monitor for changes in behavior         COVID     Continue meds  Monitor symptoms            Medications, treatments, and labs reviewed  Continue medications and treatments as listed in EMR      Scribe Attestation  Jessica BREWER Scribe   attest that this documentation has been prepared under the direction and in the presence of Christopher Jaime MD.     Provider Attestation - Scribe documentation  All medical record  entries made by the Scribe were at my direction and personally dictated by me. I have reviewed the chart and agree that the record accurately reflects my personal performance of the history, physical exam, discussion and plan.   Christopher Jaime MD

## 2023-10-04 ENCOUNTER — NURSING HOME VISIT (OUTPATIENT)
Dept: POST ACUTE CARE | Facility: EXTERNAL LOCATION | Age: 73
End: 2023-10-04
Payer: COMMERCIAL

## 2023-10-04 DIAGNOSIS — E11.9 TYPE 2 DIABETES MELLITUS WITHOUT COMPLICATION, WITHOUT LONG-TERM CURRENT USE OF INSULIN (MULTI): ICD-10-CM

## 2023-10-04 DIAGNOSIS — U07.1 COVID: Primary | ICD-10-CM

## 2023-10-04 DIAGNOSIS — I10 HYPERTENSION, ESSENTIAL: ICD-10-CM

## 2023-10-04 PROCEDURE — 99309 SBSQ NF CARE MODERATE MDM 30: CPT | Performed by: NURSE PRACTITIONER

## 2023-10-04 NOTE — LETTER
Patient: Estella Lanese  : 1950    Encounter Date: 10/04/2023    PROGRESS NOTE    Subjective  Chief complaint: Estella M Lanese is a 73 y.o. female who is a long term care patient being seen and evaluated for follow-up of Covid.    HPI:  HPI   Patient being seen for follow-up of COVID.  Nurse called on 10/2 to report patient positive for COVID.  BMP was okay.  Other labs were unable to obtain.  Order was given to repeat CBC and D-dimer.  Nurse called on 10/3 to report D-dimer and CBC unremarkable.  Patient remains on isolation precautions.  Denies n/v/f/c and sob.  No further concerns reported.    Objective  Vital signs:  18, 152/61, 98.5, 78, 97%,   Physical Exam  Constitutional:       General: She is not in acute distress.  Eyes:      Extraocular Movements: Extraocular movements intact.   Cardiovascular:      Rate and Rhythm: Normal rate and regular rhythm.   Pulmonary:      Effort: Pulmonary effort is normal.      Breath sounds: Normal breath sounds.   Abdominal:      General: Bowel sounds are normal.      Palpations: Abdomen is soft.   Musculoskeletal:      Cervical back: Neck supple.      Comments: Generalized weakness   Neurological:      Mental Status: She is alert.   Psychiatric:         Mood and Affect: Mood normal.         Behavior: Behavior is cooperative.         Assessment/Plan  Problem List Items Addressed This Visit       COVID - Primary     Isolation  Supportive treatment as needed         Hypertension, essential     Continue antihypertensives  Continue to monitor         Type 2 diabetes mellitus without complication, without long-term current use of insulin (CMS/[facility])     3/3/2023 A1c 5.3  FBG at goal  Not currently on any medications for diabetes  Monitor          Medications, treatments, and labs reviewed  Continue medications and treatments as listed in EMR    Scribe Attestation  I, Kellen Shen   attest that this documentation has been prepared under the direction and in the  presence of SHERRI Cherry    Provider Attestation - Scribe documentation  All medical record entries made by the Scribe were at my direction and personally dictated by me. I have reviewed the chart and agree that the record accurately reflects my personal performance of the history, physical exam, discussion and plan.   SHERRI Cherry        Electronically Signed By: SHERRI Cherry   10/11/23  4:24 PM

## 2023-10-05 ENCOUNTER — NURSING HOME VISIT (OUTPATIENT)
Dept: POST ACUTE CARE | Facility: EXTERNAL LOCATION | Age: 73
End: 2023-10-05
Payer: COMMERCIAL

## 2023-10-05 DIAGNOSIS — U07.1 COVID: Primary | ICD-10-CM

## 2023-10-05 DIAGNOSIS — I10 HYPERTENSION, ESSENTIAL: ICD-10-CM

## 2023-10-05 PROCEDURE — 99308 SBSQ NF CARE LOW MDM 20: CPT | Performed by: NURSE PRACTITIONER

## 2023-10-05 NOTE — LETTER
Patient: Estella Lanese  : 1950    Encounter Date: 10/05/2023    PROGRESS NOTE    Subjective  Chief complaint: Estella M Lanese is a 73 y.o. female who is a long term care patient being seen and evaluated for follow-up of Covid 19 virus.    HPI:  HPI   Patient found to be positive for COVID 19 virus on 10/2 remains on isolation precautions.  Patient has no complaints.  Denies n/v/f/c and sob.  No further concerns reported.  Objective  Vital signs:  16, 148/77, 98.2, 61, 95%,   Physical Exam  Constitutional:       General: She is not in acute distress.  Eyes:      Extraocular Movements: Extraocular movements intact.   Cardiovascular:      Rate and Rhythm: Normal rate and regular rhythm.   Pulmonary:      Effort: Pulmonary effort is normal.      Breath sounds: Normal breath sounds.   Abdominal:      General: Bowel sounds are normal.      Palpations: Abdomen is soft.   Musculoskeletal:      Cervical back: Neck supple.      Comments: Generalized weakness   Neurological:      Mental Status: She is alert.   Psychiatric:         Mood and Affect: Mood normal.         Behavior: Behavior is cooperative.         Assessment/Plan  Problem List Items Addressed This Visit       COVID - Primary     Stable  Isolation  Supportive treatment as needed         Hypertension, essential     Continue antihypertensives  Continue to monitor          Medications, treatments, and labs reviewed  Continue medications and treatments as listed in EMR    Scribe Attestation  ISylvie Scribe   attest that this documentation has been prepared under the direction and in the presence of BRENDA Cherry-CNP    Provider Attestation - Scribe documentation  All medical record entries made by the Scribe were at my direction and personally dictated by me. I have reviewed the chart and agree that the record accurately reflects my personal performance of the history, physical exam, discussion and plan.   Jeanette Arellano,  APRN-CNP        Electronically Signed By: SHERRI Cherry   10/13/23  2:16 PM

## 2023-10-06 ENCOUNTER — NURSING HOME VISIT (OUTPATIENT)
Dept: POST ACUTE CARE | Facility: EXTERNAL LOCATION | Age: 73
End: 2023-10-06
Payer: COMMERCIAL

## 2023-10-06 DIAGNOSIS — U07.1 COVID: ICD-10-CM

## 2023-10-06 DIAGNOSIS — I10 HYPERTENSION, ESSENTIAL: Primary | ICD-10-CM

## 2023-10-06 PROCEDURE — 99308 SBSQ NF CARE LOW MDM 20: CPT | Performed by: INTERNAL MEDICINE

## 2023-10-06 NOTE — LETTER
Patient: Estella Lanese  : 1950    Encounter Date: 10/06/2023    PROGRESS NOTE    Subjective  Chief complaint: Estella M Lanese is a 73 y.o. female who is a long term care patient being seen and evaluated for covid    HPI:  Patient with covid continues in isolation. Denies SOB, fever, chills, body aches, fever or GI issues. Denies chest pain or headache. No acute distress.       Objective  Vital signs: 127/74, 98%    Physical Exam  Constitutional:       General: She is not in acute distress.  Eyes:      Extraocular Movements: Extraocular movements intact.   Cardiovascular:      Rate and Rhythm: Normal rate and regular rhythm.   Pulmonary:      Effort: Pulmonary effort is normal.      Breath sounds: Normal breath sounds.   Abdominal:      General: Bowel sounds are normal.      Palpations: Abdomen is soft.   Musculoskeletal:      Cervical back: Neck supple.      Right lower leg: No edema.      Left lower leg: No edema.   Neurological:      Mental Status: She is alert.   Psychiatric:         Mood and Affect: Mood normal.         Behavior: Behavior is cooperative.         Assessment/Plan  Problem List Items Addressed This Visit       Hypertension, essential - Primary     Blood pressure at goal  Continue antihypertensives  Continue to monitor         COVID     Continue meds  Monitor symptoms            Medications, treatments, and labs reviewed  Continue medications and treatments as listed in [facility]    Scribe Attestation  By signing my name below, I, Kellen Vega   attest that this documentation has been prepared under the direction and in the presence of Christopher Jaime MD.    Provider Attestation - Scribe documentation  All medical record entries made by the Scribe were at my direction and personally dictated by me. I have reviewed the chart and agree that the record accurately reflects my personal performance of the history, physical exam, discussion and plan.    1. Hypertension, essential        2.  COVID               Electronically Signed By: Christopher Jaime MD   10/8/23  1:31 PM

## 2023-10-07 NOTE — PROGRESS NOTES
PROGRESS NOTE    Subjective   Chief complaint: Estella M Lanese is a 73 y.o. female who is a long term care patient being seen and evaluated for covid    HPI:  Patient with covid continues in isolation. Denies SOB, fever, chills, body aches, fever or GI issues. Denies chest pain or headache. No acute distress.       Objective   Vital signs: 127/74, 98%    Physical Exam  Constitutional:       General: She is not in acute distress.  Eyes:      Extraocular Movements: Extraocular movements intact.   Cardiovascular:      Rate and Rhythm: Normal rate and regular rhythm.   Pulmonary:      Effort: Pulmonary effort is normal.      Breath sounds: Normal breath sounds.   Abdominal:      General: Bowel sounds are normal.      Palpations: Abdomen is soft.   Musculoskeletal:      Cervical back: Neck supple.      Right lower leg: No edema.      Left lower leg: No edema.   Neurological:      Mental Status: She is alert.   Psychiatric:         Mood and Affect: Mood normal.         Behavior: Behavior is cooperative.         Assessment/Plan   Problem List Items Addressed This Visit       Hypertension, essential - Primary     Blood pressure at goal  Continue antihypertensives  Continue to monitor         COVID     Continue meds  Monitor symptoms            Medications, treatments, and labs reviewed  Continue medications and treatments as listed in [facility]    Scribe Attestation  By signing my name below, ISheba Scribe   attest that this documentation has been prepared under the direction and in the presence of Christopher Jaime MD.    Provider Attestation - Scribe documentation  All medical record entries made by the Scribe were at my direction and personally dictated by me. I have reviewed the chart and agree that the record accurately reflects my personal performance of the history, physical exam, discussion and plan.    1. Hypertension, essential        2. COVID

## 2023-10-09 ENCOUNTER — NURSING HOME VISIT (OUTPATIENT)
Dept: POST ACUTE CARE | Facility: EXTERNAL LOCATION | Age: 73
End: 2023-10-09
Payer: COMMERCIAL

## 2023-10-09 DIAGNOSIS — I10 HYPERTENSION, ESSENTIAL: ICD-10-CM

## 2023-10-09 DIAGNOSIS — G30.9 MODERATE ALZHEIMER'S DEMENTIA WITH ANXIETY, UNSPECIFIED TIMING OF DEMENTIA ONSET (MULTI): ICD-10-CM

## 2023-10-09 DIAGNOSIS — F02.B4 MODERATE ALZHEIMER'S DEMENTIA WITH ANXIETY, UNSPECIFIED TIMING OF DEMENTIA ONSET (MULTI): ICD-10-CM

## 2023-10-09 DIAGNOSIS — U07.1 COVID: Primary | ICD-10-CM

## 2023-10-09 PROCEDURE — 99308 SBSQ NF CARE LOW MDM 20: CPT | Performed by: NURSE PRACTITIONER

## 2023-10-09 NOTE — PROGRESS NOTES
PROGRESS NOTE    Subjective   Chief complaint: Estella M Lanese is a 73 y.o. female who is a long term care patient being seen and evaluated for follow-up of Covid.    HPI:  HPI   Patient being seen for follow-up of COVID.  Nurse called on 10/2 to report patient positive for COVID.  BMP was okay.  Other labs were unable to obtain.  Order was given to repeat CBC and D-dimer.  Nurse called on 10/3 to report D-dimer and CBC unremarkable.  Patient remains on isolation precautions.  Denies n/v/f/c and sob.  No further concerns reported.    Objective   Vital signs:  18, 152/61, 98.5, 78, 97%,   Physical Exam  Constitutional:       General: She is not in acute distress.  Eyes:      Extraocular Movements: Extraocular movements intact.   Cardiovascular:      Rate and Rhythm: Normal rate and regular rhythm.   Pulmonary:      Effort: Pulmonary effort is normal.      Breath sounds: Normal breath sounds.   Abdominal:      General: Bowel sounds are normal.      Palpations: Abdomen is soft.   Musculoskeletal:      Cervical back: Neck supple.      Comments: Generalized weakness   Neurological:      Mental Status: She is alert.   Psychiatric:         Mood and Affect: Mood normal.         Behavior: Behavior is cooperative.         Assessment/Plan   Problem List Items Addressed This Visit       COVID - Primary     Isolation  Supportive treatment as needed         Hypertension, essential     Continue antihypertensives  Continue to monitor         Type 2 diabetes mellitus without complication, without long-term current use of insulin (CMS/[facility])     3/3/2023 A1c 5.3  FBG at goal  Not currently on any medications for diabetes  Monitor          Medications, treatments, and labs reviewed  Continue medications and treatments as listed in EMR    Scribe Attestation  I, Kellen Shen   attest that this documentation has been prepared under the direction and in the presence of BRENDA Cherry-CNP    Provider Attestation -  Scribe documentation  All medical record entries made by the Scribe were at my direction and personally dictated by me. I have reviewed the chart and agree that the record accurately reflects my personal performance of the history, physical exam, discussion and plan.   Jeanette Arellano, APRN-CNP

## 2023-10-09 NOTE — LETTER
Patient: Estella Lanese  : 1950    Encounter Date: 10/09/2023    PROGRESS NOTE    Subjective  Chief complaint: Estella M Lanese is a 73 y.o. female who is a long term care patient being seen and evaluated for follow-up of Covid.    HPI:  HPI   Patient being seen for follow-up of COVID.  States she is feeling pretty good.  Denies n/v/f/c, sob and diarrhea.  No further concerns reported.  Objective  Vital signs:   18, 132/68, 98.0, 78, 97%,   Physical Exam  Constitutional:       General: She is not in acute distress.  Cardiovascular:      Rate and Rhythm: Normal rate and regular rhythm.   Pulmonary:      Effort: Pulmonary effort is normal.      Breath sounds: Normal breath sounds.   Musculoskeletal:      Cervical back: Neck supple.      Comments: Generalized weakness   Neurological:      Mental Status: She is alert.   Psychiatric:         Behavior: Behavior is cooperative.         Assessment/Plan  Problem List Items Addressed This Visit       COVID - Primary     Stable  Isolation  Supportive treatment as needed         Hypertension, essential     Continue antihypertensives  Continue to monitor  Blood pressure at goal         Moderate Alzheimer's dementia with anxiety, unspecified timing of dementia onset (CMS/[facility])     Medications, treatments, and labs reviewed  Continue medications and treatments as listed in EMR    Scribe Attestation  ISylvie Scribe   attest that this documentation has been prepared under the direction and in the presence of SHERRI Cherry    Provider Attestation - Scribe documentation  All medical record entries made by the Scribe were at my direction and personally dictated by me. I have reviewed the chart and agree that the record accurately reflects my personal performance of the history, physical exam, discussion and plan.   SHERRI Cherry        Electronically Signed By: SHERRI Cherry   10/15/23  4:02 PM

## 2023-10-10 ENCOUNTER — NURSING HOME VISIT (OUTPATIENT)
Dept: POST ACUTE CARE | Facility: EXTERNAL LOCATION | Age: 73
End: 2023-10-10
Payer: COMMERCIAL

## 2023-10-10 DIAGNOSIS — U07.1 COVID: ICD-10-CM

## 2023-10-10 PROCEDURE — 99308 SBSQ NF CARE LOW MDM 20: CPT | Performed by: INTERNAL MEDICINE

## 2023-10-10 NOTE — LETTER
Patient: Estella Lanese  : 1950    Encounter Date: 10/10/2023    PROGRESS NOTE    Subjective  Chief complaint: Estella M Lanese is a 73 y.o. female who is a long term care patient being seen and evaluated for fu COVID.     HPI:  Patient seen and examined at bedside.  Patient continues to improve, denies cough\congestion\SOB. Patient remains afebrile. No new nursing concerns.       Objective  Vital signs: 125/61,83,97%,     Physical Exam  Constitutional:       General: She is not in acute distress.  Eyes:      Extraocular Movements: Extraocular movements intact.   Cardiovascular:      Rate and Rhythm: Normal rate and regular rhythm.   Pulmonary:      Effort: Pulmonary effort is normal.      Breath sounds: Normal breath sounds.   Abdominal:      General: Bowel sounds are normal.      Palpations: Abdomen is soft.   Musculoskeletal:      Cervical back: Neck supple.      Right lower leg: No edema.      Left lower leg: No edema.   Neurological:      Mental Status: She is alert.   Psychiatric:         Mood and Affect: Mood normal.         Behavior: Behavior is cooperative.         Assessment/Plan  Problem List Items Addressed This Visit       COVID     Improving  Continue to monitor             Medications, treatments, and labs reviewed  Continue medications and treatments as listed in EMR      Scribe Attestation  Jessica BREWER Scribe   attest that this documentation has been prepared under the direction and in the presence of Christopher Jaime MD.     Provider Attestation - Scribe documentation  All medical record entries made by the Scribe were at my direction and personally dictated by me. I have reviewed the chart and agree that the record accurately reflects my personal performance of the history, physical exam, discussion and plan.   Christopher Jaime MD      Electronically Signed By: Christopher Jaime MD   10/10/23  3:39 PM

## 2023-10-10 NOTE — PROGRESS NOTES
PROGRESS NOTE    Subjective   Chief complaint: Estella M Lanese is a 73 y.o. female who is a long term care patient being seen and evaluated for follow-up of Covid 19 virus.    HPI:  HPI   Patient found to be positive for COVID 19 virus on 10/2 remains on isolation precautions.  Patient has no complaints.  Denies n/v/f/c and sob.  No further concerns reported.  Objective   Vital signs:  16, 148/77, 98.2, 61, 95%,   Physical Exam  Constitutional:       General: She is not in acute distress.  Eyes:      Extraocular Movements: Extraocular movements intact.   Cardiovascular:      Rate and Rhythm: Normal rate and regular rhythm.   Pulmonary:      Effort: Pulmonary effort is normal.      Breath sounds: Normal breath sounds.   Abdominal:      General: Bowel sounds are normal.      Palpations: Abdomen is soft.   Musculoskeletal:      Cervical back: Neck supple.      Comments: Generalized weakness   Neurological:      Mental Status: She is alert.   Psychiatric:         Mood and Affect: Mood normal.         Behavior: Behavior is cooperative.         Assessment/Plan   Problem List Items Addressed This Visit       COVID - Primary     Stable  Isolation  Supportive treatment as needed         Hypertension, essential     Continue antihypertensives  Continue to monitor          Medications, treatments, and labs reviewed  Continue medications and treatments as listed in EMR    Scribe Attestation  ISylvie Scribe   attest that this documentation has been prepared under the direction and in the presence of SHERRI Cherry    Provider Attestation - Scribe documentation  All medical record entries made by the Scribe were at my direction and personally dictated by me. I have reviewed the chart and agree that the record accurately reflects my personal performance of the history, physical exam, discussion and plan.   SHERRI Cherry

## 2023-10-10 NOTE — PROGRESS NOTES
PROGRESS NOTE    Subjective   Chief complaint: Estella M Lanese is a 73 y.o. female who is a long term care patient being seen and evaluated for fu COVID.     HPI:  Patient seen and examined at bedside.  Patient continues to improve, denies cough\congestion\SOB. Patient remains afebrile. No new nursing concerns.       Objective   Vital signs: 125/61,83,97%,     Physical Exam  Constitutional:       General: She is not in acute distress.  Eyes:      Extraocular Movements: Extraocular movements intact.   Cardiovascular:      Rate and Rhythm: Normal rate and regular rhythm.   Pulmonary:      Effort: Pulmonary effort is normal.      Breath sounds: Normal breath sounds.   Abdominal:      General: Bowel sounds are normal.      Palpations: Abdomen is soft.   Musculoskeletal:      Cervical back: Neck supple.      Right lower leg: No edema.      Left lower leg: No edema.   Neurological:      Mental Status: She is alert.   Psychiatric:         Mood and Affect: Mood normal.         Behavior: Behavior is cooperative.         Assessment/Plan   Problem List Items Addressed This Visit       COVID     Improving  Continue to monitor             Medications, treatments, and labs reviewed  Continue medications and treatments as listed in EMR      Scribe Attestation  IJessica Scribe   attest that this documentation has been prepared under the direction and in the presence of Christopher Jaime MD.     Provider Attestation - Scribe documentation  All medical record entries made by the Scribe were at my direction and personally dictated by me. I have reviewed the chart and agree that the record accurately reflects my personal performance of the history, physical exam, discussion and plan.   Christopher Jaime MD

## 2023-10-11 NOTE — PROGRESS NOTES
PROGRESS NOTE    Subjective   Chief complaint: Estella M Lanese is a 73 y.o. female who is a long term care patient being seen and evaluated for follow-up of Covid.    HPI:  HPI   Patient being seen for follow-up of COVID.  States she is feeling pretty good.  Denies n/v/f/c, sob and diarrhea.  No further concerns reported.  Objective   Vital signs:   18, 132/68, 98.0, 78, 97%,   Physical Exam  Constitutional:       General: She is not in acute distress.  Cardiovascular:      Rate and Rhythm: Normal rate and regular rhythm.   Pulmonary:      Effort: Pulmonary effort is normal.      Breath sounds: Normal breath sounds.   Musculoskeletal:      Cervical back: Neck supple.      Comments: Generalized weakness   Neurological:      Mental Status: She is alert.   Psychiatric:         Behavior: Behavior is cooperative.         Assessment/Plan   Problem List Items Addressed This Visit       COVID - Primary     Stable  Isolation  Supportive treatment as needed         Hypertension, essential     Continue antihypertensives  Continue to monitor  Blood pressure at goal         Moderate Alzheimer's dementia with anxiety, unspecified timing of dementia onset (CMS/[facility])     Medications, treatments, and labs reviewed  Continue medications and treatments as listed in EMR    Scribe Attestation  I, Kellen Shen   attest that this documentation has been prepared under the direction and in the presence of SHERRI Cherry    Provider Attestation - Scribe documentation  All medical record entries made by the Scribe were at my direction and personally dictated by me. I have reviewed the chart and agree that the record accurately reflects my personal performance of the history, physical exam, discussion and plan.   SHERRI Cherry

## 2023-10-24 ENCOUNTER — NURSING HOME VISIT (OUTPATIENT)
Dept: POST ACUTE CARE | Facility: EXTERNAL LOCATION | Age: 73
End: 2023-10-24
Payer: COMMERCIAL

## 2023-10-24 DIAGNOSIS — I10 HYPERTENSION, ESSENTIAL: ICD-10-CM

## 2023-10-24 DIAGNOSIS — F32.A DEPRESSION, UNSPECIFIED DEPRESSION TYPE: ICD-10-CM

## 2023-10-24 DIAGNOSIS — E11.9 TYPE 2 DIABETES MELLITUS WITHOUT COMPLICATION, WITHOUT LONG-TERM CURRENT USE OF INSULIN (MULTI): ICD-10-CM

## 2023-10-24 PROCEDURE — 99309 SBSQ NF CARE MODERATE MDM 30: CPT | Performed by: INTERNAL MEDICINE

## 2023-10-24 NOTE — PROGRESS NOTES
PROGRESS NOTE    Subjective   Chief complaint: Estella M Lanese is a 73 y.o. female who is a long term care patient being seen and evaluated for monthly general medical care and follow-up.    HPI:  Patient presents for general medical care and f/u.  Patient seen and examined at bedside. Patient has dementia and requires assistance with ADLs. T2DM, Patient denies vision changes, excessive thirst, sweating, urinary frequency.  HTN, denies fatigue, sob, and palpitations. Patient with diagnosis of depression, denies feeling down and thoughts of harming self or others. No issues per nursing.  Patient has no acute complaints.       Objective   Vital signs: 122/64,75,97%,     Physical Exam  Constitutional:       General: She is not in acute distress.  Eyes:      Extraocular Movements: Extraocular movements intact.   Cardiovascular:      Rate and Rhythm: Normal rate and regular rhythm.   Pulmonary:      Effort: Pulmonary effort is normal.      Breath sounds: Normal breath sounds.   Abdominal:      General: Bowel sounds are normal.      Palpations: Abdomen is soft.   Musculoskeletal:      Cervical back: Neck supple.      Right lower leg: No edema.      Left lower leg: No edema.   Neurological:      Mental Status: She is alert.   Psychiatric:         Mood and Affect: Mood normal.         Behavior: Behavior is cooperative.         Assessment/Plan   Problem List Items Addressed This Visit       Depression     Continue meds  monitor for changes in mood         Type 2 diabetes mellitus without complication, without long-term current use of insulin (CMS/[facility])     Glucoscan  LCS diet         Hypertension, essential     Blood pressure at goal  Continue antihypertensives  Continue to monitor          Medications, treatments, and labs reviewed  Continue medications and treatments as listed in EMR      Scribe Attestation  I, Kellen Blum   attest that this documentation has been prepared under the direction and in the  presence of Christopher Jaime MD.     Provider Attestation - Scribe documentation  All medical record entries made by the Scribe were at my direction and personally dictated by me. I have reviewed the chart and agree that the record accurately reflects my personal performance of the history, physical exam, discussion and plan.   Christopher Jaime MD

## 2023-10-24 NOTE — LETTER
Patient: Estella Lanese  : 1950    Encounter Date: 10/24/2023    PROGRESS NOTE    Subjective  Chief complaint: Estella M Lanese is a 73 y.o. female who is a long term care patient being seen and evaluated for monthly general medical care and follow-up.    HPI:  Patient presents for general medical care and f/u.  Patient seen and examined at bedside. Patient has dementia and requires assistance with ADLs. T2DM, Patient denies vision changes, excessive thirst, sweating, urinary frequency.  HTN, denies fatigue, sob, and palpitations. Patient with diagnosis of depression, denies feeling down and thoughts of harming self or others. No issues per nursing.  Patient has no acute complaints.       Objective  Vital signs: 122/64,75,97%,     Physical Exam  Constitutional:       General: She is not in acute distress.  Eyes:      Extraocular Movements: Extraocular movements intact.   Cardiovascular:      Rate and Rhythm: Normal rate and regular rhythm.   Pulmonary:      Effort: Pulmonary effort is normal.      Breath sounds: Normal breath sounds.   Abdominal:      General: Bowel sounds are normal.      Palpations: Abdomen is soft.   Musculoskeletal:      Cervical back: Neck supple.      Right lower leg: No edema.      Left lower leg: No edema.   Neurological:      Mental Status: She is alert.   Psychiatric:         Mood and Affect: Mood normal.         Behavior: Behavior is cooperative.         Assessment/Plan  Problem List Items Addressed This Visit       Depression     Continue meds  monitor for changes in mood         Type 2 diabetes mellitus without complication, without long-term current use of insulin (CMS/[facility])     Glucoscan  LCS diet         Hypertension, essential     Blood pressure at goal  Continue antihypertensives  Continue to monitor          Medications, treatments, and labs reviewed  Continue medications and treatments as listed in EMR      Scribe Attestation  I, Kellen Blum   attest that  this documentation has been prepared under the direction and in the presence of Christopher Jaime MD.     Provider Attestation - Scribe documentation  All medical record entries made by the Scribe were at my direction and personally dictated by me. I have reviewed the chart and agree that the record accurately reflects my personal performance of the history, physical exam, discussion and plan.   Christopher Jaime MD      Electronically Signed By: Christopher Jaime MD   10/24/23  3:10 PM

## 2023-11-21 ENCOUNTER — NURSING HOME VISIT (OUTPATIENT)
Dept: POST ACUTE CARE | Facility: EXTERNAL LOCATION | Age: 73
End: 2023-11-21
Payer: COMMERCIAL

## 2023-11-21 DIAGNOSIS — I10 HYPERTENSION, ESSENTIAL: ICD-10-CM

## 2023-11-21 DIAGNOSIS — G40.201 PARTIAL SYMPTOMATIC EPILEPSY WITH COMPLEX PARTIAL SEIZURES, NOT INTRACTABLE, WITH STATUS EPILEPTICUS (MULTI): ICD-10-CM

## 2023-11-21 DIAGNOSIS — E11.9 TYPE 2 DIABETES MELLITUS WITHOUT COMPLICATION, WITHOUT LONG-TERM CURRENT USE OF INSULIN (MULTI): ICD-10-CM

## 2023-11-21 DIAGNOSIS — F02.B4 MODERATE ALZHEIMER'S DEMENTIA WITH ANXIETY, UNSPECIFIED TIMING OF DEMENTIA ONSET (MULTI): ICD-10-CM

## 2023-11-21 DIAGNOSIS — G30.9 MODERATE ALZHEIMER'S DEMENTIA WITH ANXIETY, UNSPECIFIED TIMING OF DEMENTIA ONSET (MULTI): ICD-10-CM

## 2023-11-21 PROCEDURE — 99309 SBSQ NF CARE MODERATE MDM 30: CPT | Performed by: INTERNAL MEDICINE

## 2023-11-21 NOTE — LETTER
Patient: Estella Lanese  : 1950    Encounter Date: 2023    PROGRESS NOTE    Subjective  Chief complaint: Estella M Lanese is a 73 y.o. female who is a long term care patient being seen and evaluated for monthly general medical care and follow-up.    HPI:  Patient presents for general medical care and f/u.  Patient seen and examined at bedside. Patient has dementia and requires assistance with ADLs. T2DM, Patient denies vision changes, excessive thirst, sweating, urinary frequency.  HTN, denies fatigue, sob, and palpitations. Seizures controlled. No recent seizures controlled. No adverse effects noted tolerating antiepileptic medications. Denies changes in weakness or speech.   No issues per nursing.  Patient has no acute complaints.       Objective  Vital signs: 122/64,75,97%,     Physical Exam  Constitutional:       General: She is not in acute distress.  Eyes:      Extraocular Movements: Extraocular movements intact.   Cardiovascular:      Rate and Rhythm: Normal rate and regular rhythm.   Pulmonary:      Effort: Pulmonary effort is normal.      Breath sounds: Normal breath sounds.   Abdominal:      General: Bowel sounds are normal.      Palpations: Abdomen is soft.   Musculoskeletal:      Cervical back: Neck supple.      Right lower leg: No edema.      Left lower leg: No edema.   Neurological:      Mental Status: She is alert.   Psychiatric:         Mood and Affect: Mood normal.         Behavior: Behavior is cooperative.         Assessment/Plan  Problem List Items Addressed This Visit       Type 2 diabetes mellitus without complication, without long-term current use of insulin (CMS/HCC)     Glucoscan  LCS diet         Hypertension, essential     Blood pressure at goal  Continue antihypertensives  Continue to monitor         Partial symptomatic epilepsy with complex partial seizures, not intractable, with status epilepticus (CMS/[facility])     Continue Lacosamide  Monitor for seizure activity          Moderate Alzheimer's dementia with anxiety, unspecified timing of dementia onset (CMS/[facility])     Mentation at baseline  Monitor safety  Monitor for changes in behavior          Medications, treatments, and labs reviewed  Continue medications and treatments as listed in EMR      Scribe Attestation  Jessica BREWER Scribe   attest that this documentation has been prepared under the direction and in the presence of Christopher Jaime MD.     Provider Attestation - Scribe documentation  All medical record entries made by the Scribe were at my direction and personally dictated by me. I have reviewed the chart and agree that the record accurately reflects my personal performance of the history, physical exam, discussion and plan.   Christopher Jaime MD      Electronically Signed By: Christopher Jaime MD   11/21/23  6:43 PM

## 2023-11-21 NOTE — PROGRESS NOTES
PROGRESS NOTE    Subjective   Chief complaint: Estella M Lanese is a 73 y.o. female who is a long term care patient being seen and evaluated for monthly general medical care and follow-up.    HPI:  Patient presents for general medical care and f/u.  Patient seen and examined at bedside. Patient has dementia and requires assistance with ADLs. T2DM, Patient denies vision changes, excessive thirst, sweating, urinary frequency.  HTN, denies fatigue, sob, and palpitations. Seizures controlled. No recent seizures controlled. No adverse effects noted tolerating antiepileptic medications. Denies changes in weakness or speech.   No issues per nursing.  Patient has no acute complaints.       Objective   Vital signs: 122/64,75,97%,     Physical Exam  Constitutional:       General: She is not in acute distress.  Eyes:      Extraocular Movements: Extraocular movements intact.   Cardiovascular:      Rate and Rhythm: Normal rate and regular rhythm.   Pulmonary:      Effort: Pulmonary effort is normal.      Breath sounds: Normal breath sounds.   Abdominal:      General: Bowel sounds are normal.      Palpations: Abdomen is soft.   Musculoskeletal:      Cervical back: Neck supple.      Right lower leg: No edema.      Left lower leg: No edema.   Neurological:      Mental Status: She is alert.   Psychiatric:         Mood and Affect: Mood normal.         Behavior: Behavior is cooperative.         Assessment/Plan   Problem List Items Addressed This Visit       Type 2 diabetes mellitus without complication, without long-term current use of insulin (CMS/HCC)     Glucoscan  LCS diet         Hypertension, essential     Blood pressure at goal  Continue antihypertensives  Continue to monitor         Partial symptomatic epilepsy with complex partial seizures, not intractable, with status epilepticus (CMS/HCC)     Continue Lacosamide  Monitor for seizure activity         Moderate Alzheimer's dementia with anxiety, unspecified timing of  dementia onset (CMS/[facility])     Mentation at baseline  Monitor safety  Monitor for changes in behavior          Medications, treatments, and labs reviewed  Continue medications and treatments as listed in EMR      Scribe Attestation  DANIELLA, Kellen Blum   attest that this documentation has been prepared under the direction and in the presence of Christopher Jaime MD.     Provider Attestation - Scribe documentation  All medical record entries made by the Scribe were at my direction and personally dictated by me. I have reviewed the chart and agree that the record accurately reflects my personal performance of the history, physical exam, discussion and plan.   Christopher Jaime MD

## 2023-11-23 ENCOUNTER — NURSING HOME VISIT (OUTPATIENT)
Dept: POST ACUTE CARE | Facility: EXTERNAL LOCATION | Age: 73
End: 2023-11-23
Payer: COMMERCIAL

## 2023-11-23 DIAGNOSIS — E11.9 TYPE 2 DIABETES MELLITUS WITHOUT COMPLICATION, WITHOUT LONG-TERM CURRENT USE OF INSULIN (MULTI): Primary | ICD-10-CM

## 2023-11-23 DIAGNOSIS — E87.5 HYPERKALEMIA: ICD-10-CM

## 2023-11-23 DIAGNOSIS — I10 HYPERTENSION, ESSENTIAL: ICD-10-CM

## 2023-11-23 PROCEDURE — 99309 SBSQ NF CARE MODERATE MDM 30: CPT | Performed by: NURSE PRACTITIONER

## 2023-11-23 NOTE — LETTER
Patient: Estella Lanese  : 1950    Encounter Date: 2023    PROGRESS NOTE    Subjective  Chief complaint: Estella M Lanese is a 73 y.o. female who is a long term care patient being seen and evaluated for follow-up labs.    HPI:  HPI  Patient presents due to recent labs.  Labs revealed potassium of 5.6, TSH 1.43, A1c 5.6, and Hgb 12.3.  Patient seen and examined at bedside, in no acute distress.  Denies nausea, vomiting, fever, chills.  Denies excessive thirst.  Denies chest pain or shortness of breath.  Nursing reports no new concerns at this time    Objective  Vital signs: 122/64, 97.9, 75, 17, 98%, blood sugar 136    Physical Exam  Constitutional:       General: She is not in acute distress.  Cardiovascular:      Rate and Rhythm: Normal rate and regular rhythm.   Pulmonary:      Effort: Pulmonary effort is normal.      Breath sounds: Normal breath sounds.   Musculoskeletal:      Cervical back: Neck supple.   Neurological:      Mental Status: She is alert.   Psychiatric:         Behavior: Behavior is cooperative.         Assessment/Plan  Problem List Items Addressed This Visit          Cardiac and Vasculature    Hypertension, essential     BP daily x7 days            Endocrine/Metabolic    Type 2 diabetes mellitus without complication, without long-term current use of insulin (CMS/[facility]) - Primary     Controlled            Genitourinary and Reproductive    Hyperkalemia     Kayexalate  DC losartan  K level in the morning and BMP in 1 week          Medications, treatments, and labs reviewed  Continue medications and treatments as listed in EMR    Scribe Attestation  I, Kellen Alcantara   attest that this documentation has been prepared under the direction and in the presence of BRENDA Cherry-CNP    Provider Attestation - Scribe documentation  All medical record entries made by the Scribe were at my direction and personally dictated by me. I have reviewed the chart and agree that the record  accurately reflects my personal performance of the history, physical exam, discussion and plan.   SHERRI Cherry            Electronically Signed By: SHERRI Cherry   11/29/23  6:44 PM

## 2023-11-28 ENCOUNTER — NURSING HOME VISIT (OUTPATIENT)
Dept: POST ACUTE CARE | Facility: EXTERNAL LOCATION | Age: 73
End: 2023-11-28
Payer: COMMERCIAL

## 2023-11-28 DIAGNOSIS — E87.5 HYPERKALEMIA: ICD-10-CM

## 2023-11-28 PROCEDURE — 99308 SBSQ NF CARE LOW MDM 20: CPT | Performed by: INTERNAL MEDICINE

## 2023-11-28 NOTE — LETTER
Patient: Estella Lanese  : 1950    Encounter Date: 2023    PROGRESS NOTE    Subjective  Chief complaint: Estella M Lanese is a 73 y.o. female who is a long term care patient being seen and evaluated for follow up.     HPI:  Patient seen and examined at bedside. Recent labs reflected hyperkalemia. Patient given kayexelate and losartan potassium was discontinued. Repeat was ordered  however was not drawn. Spoke with nursing staff and reordered BMP for 23. No new concerns.       Objective  Vital signs: 120/56,74,98%,     Physical Exam  Constitutional:       General: She is not in acute distress.  Eyes:      Extraocular Movements: Extraocular movements intact.   Cardiovascular:      Rate and Rhythm: Normal rate and regular rhythm.   Pulmonary:      Effort: Pulmonary effort is normal.      Breath sounds: Normal breath sounds.   Abdominal:      General: Bowel sounds are normal.      Palpations: Abdomen is soft.   Musculoskeletal:      Cervical back: Neck supple.      Right lower leg: No edema.      Left lower leg: No edema.   Neurological:      Mental Status: She is alert.   Psychiatric:         Mood and Affect: Mood normal.         Behavior: Behavior is cooperative.         Assessment/Plan  Problem List Items Addressed This Visit       Hyperkalemia     BMP 23  Monitor           Medications, treatments, and labs reviewed  Continue medications and treatments as listed in EMR      Scribe Attestation  Jessica BREWER Scribe   attest that this documentation has been prepared under the direction and in the presence of Christopher Jaime MD.     Provider Attestation - Scribe documentation  All medical record entries made by the Scribe were at my direction and personally dictated by me. I have reviewed the chart and agree that the record accurately reflects my personal performance of the history, physical exam, discussion and plan.   Christopher Jaime MD      Electronically Signed By: Christopher LAGUNAS  MD Yeni   11/28/23  8:29 PM

## 2023-11-28 NOTE — PROGRESS NOTES
PROGRESS NOTE    Subjective   Chief complaint: Estella M Lanese is a 73 y.o. female who is a long term care patient being seen and evaluated for follow-up labs.    HPI:  HPI  Patient presents due to recent labs.  Labs revealed potassium of 5.6, TSH 1.43, A1c 5.6, and Hgb 12.3.  Patient seen and examined at bedside, in no acute distress.  Denies nausea, vomiting, fever, chills.  Denies excessive thirst.  Denies chest pain or shortness of breath.  Nursing reports no new concerns at this time    Objective   Vital signs: 122/64, 97.9, 75, 17, 98%, blood sugar 136    Physical Exam  Constitutional:       General: She is not in acute distress.  Cardiovascular:      Rate and Rhythm: Normal rate and regular rhythm.   Pulmonary:      Effort: Pulmonary effort is normal.      Breath sounds: Normal breath sounds.   Musculoskeletal:      Cervical back: Neck supple.   Neurological:      Mental Status: She is alert.   Psychiatric:         Behavior: Behavior is cooperative.         Assessment/Plan   Problem List Items Addressed This Visit          Cardiac and Vasculature    Hypertension, essential     BP daily x7 days            Endocrine/Metabolic    Type 2 diabetes mellitus without complication, without long-term current use of insulin (CMS/[facility]) - Primary     Controlled            Genitourinary and Reproductive    Hyperkalemia     Kayexalate  DC losartan  K level in the morning and BMP in 1 week          Medications, treatments, and labs reviewed  Continue medications and treatments as listed in EMR    Scribe Attestation  IJocelyne Scribe   attest that this documentation has been prepared under the direction and in the presence of BRENDA Cherry-CNP    Provider Attestation - Scribe documentation  All medical record entries made by the Scribe were at my direction and personally dictated by me. I have reviewed the chart and agree that the record accurately reflects my personal performance of the history, physical  exam, discussion and plan.   Jeanette Arellano, APRN-CNP

## 2023-11-28 NOTE — PROGRESS NOTES
PROGRESS NOTE    Subjective   Chief complaint: Estella M Lanese is a 73 y.o. female who is a long term care patient being seen and evaluated for follow up.     HPI:  Patient seen and examined at bedside. Recent labs reflected hyperkalemia. Patient given kayexelate and losartan potassium was discontinued. Repeat was ordered 11/24 however was not drawn. Spoke with nursing staff and reordered BMP for 12/1/23. No new concerns.       Objective   Vital signs: 120/56,74,98%,     Physical Exam  Constitutional:       General: She is not in acute distress.  Eyes:      Extraocular Movements: Extraocular movements intact.   Cardiovascular:      Rate and Rhythm: Normal rate and regular rhythm.   Pulmonary:      Effort: Pulmonary effort is normal.      Breath sounds: Normal breath sounds.   Abdominal:      General: Bowel sounds are normal.      Palpations: Abdomen is soft.   Musculoskeletal:      Cervical back: Neck supple.      Right lower leg: No edema.      Left lower leg: No edema.   Neurological:      Mental Status: She is alert.   Psychiatric:         Mood and Affect: Mood normal.         Behavior: Behavior is cooperative.         Assessment/Plan   Problem List Items Addressed This Visit       Hyperkalemia     BMP 12/1/23  Monitor           Medications, treatments, and labs reviewed  Continue medications and treatments as listed in EMR      Scribe Attestation  I, Kellen Blum   attest that this documentation has been prepared under the direction and in the presence of Christopher Jaime MD.     Provider Attestation - Scribe documentation  All medical record entries made by the Scribe were at my direction and personally dictated by me. I have reviewed the chart and agree that the record accurately reflects my personal performance of the history, physical exam, discussion and plan.   Christopher Jaime MD

## 2023-12-19 ENCOUNTER — NURSING HOME VISIT (OUTPATIENT)
Dept: POST ACUTE CARE | Facility: EXTERNAL LOCATION | Age: 73
End: 2023-12-19
Payer: COMMERCIAL

## 2023-12-19 DIAGNOSIS — F32.A DEPRESSION, UNSPECIFIED DEPRESSION TYPE: ICD-10-CM

## 2023-12-19 DIAGNOSIS — I10 HYPERTENSION, ESSENTIAL: ICD-10-CM

## 2023-12-19 DIAGNOSIS — G30.9 MODERATE ALZHEIMER'S DEMENTIA WITH ANXIETY, UNSPECIFIED TIMING OF DEMENTIA ONSET (MULTI): ICD-10-CM

## 2023-12-19 DIAGNOSIS — F02.B4 MODERATE ALZHEIMER'S DEMENTIA WITH ANXIETY, UNSPECIFIED TIMING OF DEMENTIA ONSET (MULTI): ICD-10-CM

## 2023-12-19 DIAGNOSIS — E11.9 TYPE 2 DIABETES MELLITUS WITHOUT COMPLICATION, WITHOUT LONG-TERM CURRENT USE OF INSULIN (MULTI): ICD-10-CM

## 2023-12-19 PROCEDURE — 99309 SBSQ NF CARE MODERATE MDM 30: CPT | Performed by: INTERNAL MEDICINE

## 2023-12-19 NOTE — PROGRESS NOTES
PROGRESS NOTE    Subjective   Chief complaint: Estella M Lanese is a 73 y.o. female who is a long term care patient being seen and evaluated for monthly general medical care and follow-up    HPI:  Patient presents for general medical care and f/u.  Patient seen and examined at bedside. Patient has dementia and requires assistance with ADLs. T2DM, Patient denies vision changes, excessive thirst, sweating, urinary frequency.  HTN, denies fatigue, sob, and palpitations. Patient states her depression has improved. No issues per nursing.  Patient has no acute complaints.       Objective   Vital signs: 143/94,75,99%,     Physical Exam  Constitutional:       General: She is not in acute distress.  Eyes:      Extraocular Movements: Extraocular movements intact.   Cardiovascular:      Rate and Rhythm: Normal rate and regular rhythm.   Pulmonary:      Effort: Pulmonary effort is normal.      Breath sounds: Normal breath sounds.   Abdominal:      General: Bowel sounds are normal.      Palpations: Abdomen is soft.   Musculoskeletal:      Cervical back: Neck supple.      Right lower leg: No edema.      Left lower leg: No edema.   Neurological:      Mental Status: She is alert.   Psychiatric:         Mood and Affect: Mood normal.         Behavior: Behavior is cooperative.         Assessment/Plan   Problem List Items Addressed This Visit       Depression     Continue meds  monitor for changes in mood         Type 2 diabetes mellitus without complication, without long-term current use of insulin (CMS/[facility])     Glucoscan  LCS diet         Hypertension, essential     Blood pressure at goal  Continue antihypertensives  Continue to monitor         Moderate Alzheimer's dementia with anxiety, unspecified timing of dementia onset (CMS/[facility])     Mentation at baseline  Monitor safety  Monitor for changes in behavior          Medications, treatments, and labs reviewed  Continue medications and treatments as listed in EMR      Scribe  Attestation  I, Kellen Blum   attest that this documentation has been prepared under the direction and in the presence of Christopher Jaime MD.     Provider Attestation - Scribe documentation  All medical record entries made by the Scribe were at my direction and personally dictated by me. I have reviewed the chart and agree that the record accurately reflects my personal performance of the history, physical exam, discussion and plan.   Christopher Jaime MD

## 2023-12-19 NOTE — LETTER
Patient: Estella Lanese  : 1950    Encounter Date: 2023    PROGRESS NOTE    Subjective  Chief complaint: Estella M Lanese is a 73 y.o. female who is a long term care patient being seen and evaluated for monthly general medical care and follow-up    HPI:  Patient presents for general medical care and f/u.  Patient seen and examined at bedside. Patient has dementia and requires assistance with ADLs. T2DM, Patient denies vision changes, excessive thirst, sweating, urinary frequency.  HTN, denies fatigue, sob, and palpitations. Patient states her depression has improved. No issues per nursing.  Patient has no acute complaints.       Objective  Vital signs: 143/94,75,99%,     Physical Exam  Constitutional:       General: She is not in acute distress.  Eyes:      Extraocular Movements: Extraocular movements intact.   Cardiovascular:      Rate and Rhythm: Normal rate and regular rhythm.   Pulmonary:      Effort: Pulmonary effort is normal.      Breath sounds: Normal breath sounds.   Abdominal:      General: Bowel sounds are normal.      Palpations: Abdomen is soft.   Musculoskeletal:      Cervical back: Neck supple.      Right lower leg: No edema.      Left lower leg: No edema.   Neurological:      Mental Status: She is alert.   Psychiatric:         Mood and Affect: Mood normal.         Behavior: Behavior is cooperative.         Assessment/Plan  Problem List Items Addressed This Visit       Depression     Continue meds  monitor for changes in mood         Type 2 diabetes mellitus without complication, without long-term current use of insulin (CMS/[facility])     Glucoscan  LCS diet         Hypertension, essential     Blood pressure at goal  Continue antihypertensives  Continue to monitor         Moderate Alzheimer's dementia with anxiety, unspecified timing of dementia onset (CMS/[facility])     Mentation at baseline  Monitor safety  Monitor for changes in behavior          Medications, treatments, and labs  reviewed  Continue medications and treatments as listed in EMR      Scribe Attestation  I, Kellen Blum   attest that this documentation has been prepared under the direction and in the presence of Christopher Jaime MD.     Provider Attestation - Scribe documentation  All medical record entries made by the Scribe were at my direction and personally dictated by me. I have reviewed the chart and agree that the record accurately reflects my personal performance of the history, physical exam, discussion and plan.   Christopher Jaime MD      Electronically Signed By: Christopher Jaime MD   12/19/23  4:59 PM

## 2024-01-16 ENCOUNTER — NURSING HOME VISIT (OUTPATIENT)
Dept: POST ACUTE CARE | Facility: EXTERNAL LOCATION | Age: 74
End: 2024-01-16
Payer: COMMERCIAL

## 2024-01-16 DIAGNOSIS — I10 HYPERTENSION, ESSENTIAL: ICD-10-CM

## 2024-01-16 DIAGNOSIS — E11.9 TYPE 2 DIABETES MELLITUS WITHOUT COMPLICATION, WITHOUT LONG-TERM CURRENT USE OF INSULIN (MULTI): ICD-10-CM

## 2024-01-16 DIAGNOSIS — F32.A DEPRESSION, UNSPECIFIED DEPRESSION TYPE: ICD-10-CM

## 2024-01-16 PROCEDURE — 99309 SBSQ NF CARE MODERATE MDM 30: CPT | Performed by: INTERNAL MEDICINE

## 2024-01-16 NOTE — PROGRESS NOTES
PROGRESS NOTE    Subjective   Chief complaint: Estella M Lanese is a 73 y.o. female who is a long term care patient being seen and evaluated for monthly general medical care and follow-up    HPI:  Patient presents for general medical care and f/u.  Patient seen and examined at bedside. Patient has dementia and requires assistance with ADLs. T2DM, Patient denies vision changes, excessive thirst, sweating, urinary frequency.  HTN, denies fatigue, sob, and palpitations. Patient states her depression has improved. No issues per nursing.  Patient has no acute complaints.       Objective   Vital signs: 119/65,67,99%,     Physical Exam  Constitutional:       General: She is not in acute distress.  Eyes:      Extraocular Movements: Extraocular movements intact.   Cardiovascular:      Rate and Rhythm: Normal rate and regular rhythm.   Pulmonary:      Effort: Pulmonary effort is normal.      Breath sounds: Normal breath sounds.   Abdominal:      General: Bowel sounds are normal.      Palpations: Abdomen is soft.   Musculoskeletal:      Cervical back: Neck supple.      Right lower leg: No edema.      Left lower leg: No edema.   Neurological:      Mental Status: She is alert.   Psychiatric:         Mood and Affect: Mood normal.         Behavior: Behavior is cooperative.         Assessment/Plan   Problem List Items Addressed This Visit       Depression     Continue meds  monitor for changes in mood         Type 2 diabetes mellitus without complication, without long-term current use of insulin (CMS/[facility])     Glucoscan  LCS diet         Hypertension, essential     Blood pressure at goal  Continue antihypertensives  Continue to monitor          Medications, treatments, and labs reviewed  Continue medications and treatments as listed in EMR      Scribe Attestation  Jessica BREWER, Kellen   attest that this documentation has been prepared under the direction and in the presence of Christopher Jaime MD.     Provider  Attestation - Scribe documentation  All medical record entries made by the Scribe were at my direction and personally dictated by me. I have reviewed the chart and agree that the record accurately reflects my personal performance of the history, physical exam, discussion and plan.   Christopher Jaime MD

## 2024-01-16 NOTE — LETTER
Patient: Estella Lanese  : 1950    Encounter Date: 2024    PROGRESS NOTE    Subjective  Chief complaint: Estella M Lanese is a 73 y.o. female who is a long term care patient being seen and evaluated for monthly general medical care and follow-up    HPI:  Patient presents for general medical care and f/u.  Patient seen and examined at bedside. Patient has dementia and requires assistance with ADLs. T2DM, Patient denies vision changes, excessive thirst, sweating, urinary frequency.  HTN, denies fatigue, sob, and palpitations. Patient states her depression has improved. No issues per nursing.  Patient has no acute complaints.       Objective  Vital signs: 119/65,67,99%,     Physical Exam  Constitutional:       General: She is not in acute distress.  Eyes:      Extraocular Movements: Extraocular movements intact.   Cardiovascular:      Rate and Rhythm: Normal rate and regular rhythm.   Pulmonary:      Effort: Pulmonary effort is normal.      Breath sounds: Normal breath sounds.   Abdominal:      General: Bowel sounds are normal.      Palpations: Abdomen is soft.   Musculoskeletal:      Cervical back: Neck supple.      Right lower leg: No edema.      Left lower leg: No edema.   Neurological:      Mental Status: She is alert.   Psychiatric:         Mood and Affect: Mood normal.         Behavior: Behavior is cooperative.         Assessment/Plan  Problem List Items Addressed This Visit       Depression     Continue meds  monitor for changes in mood         Type 2 diabetes mellitus without complication, without long-term current use of insulin (CMS/[facility])     Glucoscan  LCS diet         Hypertension, essential     Blood pressure at goal  Continue antihypertensives  Continue to monitor          Medications, treatments, and labs reviewed  Continue medications and treatments as listed in EMR      Scribe Attestation  I, Kellen Blum   attest that this documentation has been prepared under the  direction and in the presence of Christopher Jaime MD.     Provider Attestation - Scribe documentation  All medical record entries made by the Scribe were at my direction and personally dictated by me. I have reviewed the chart and agree that the record accurately reflects my personal performance of the history, physical exam, discussion and plan.   Christopher Jaime MD      Electronically Signed By: Christopher Jaime MD   1/16/24  4:37 PM

## 2024-02-06 ENCOUNTER — NURSING HOME VISIT (OUTPATIENT)
Dept: POST ACUTE CARE | Facility: EXTERNAL LOCATION | Age: 74
End: 2024-02-06
Payer: COMMERCIAL

## 2024-02-06 DIAGNOSIS — I10 HYPERTENSION, ESSENTIAL: ICD-10-CM

## 2024-02-06 DIAGNOSIS — K21.9 GASTROESOPHAGEAL REFLUX DISEASE WITHOUT ESOPHAGITIS: ICD-10-CM

## 2024-02-06 DIAGNOSIS — G30.9 MODERATE ALZHEIMER'S DEMENTIA WITH ANXIETY, UNSPECIFIED TIMING OF DEMENTIA ONSET (MULTI): ICD-10-CM

## 2024-02-06 DIAGNOSIS — F02.B4 MODERATE ALZHEIMER'S DEMENTIA WITH ANXIETY, UNSPECIFIED TIMING OF DEMENTIA ONSET (MULTI): ICD-10-CM

## 2024-02-06 DIAGNOSIS — E78.5 HYPERLIPIDEMIA, UNSPECIFIED HYPERLIPIDEMIA TYPE: ICD-10-CM

## 2024-02-06 DIAGNOSIS — G40.201 PARTIAL SYMPTOMATIC EPILEPSY WITH COMPLEX PARTIAL SEIZURES, NOT INTRACTABLE, WITH STATUS EPILEPTICUS (MULTI): Primary | ICD-10-CM

## 2024-02-06 PROCEDURE — 99305 1ST NF CARE MODERATE MDM 35: CPT | Performed by: INTERNAL MEDICINE

## 2024-02-06 NOTE — PROGRESS NOTES
HISTORY & PHYSICAL    Subjective   Chief complaint: Estella M Lanese is a 74 y.o. female who is being seen and evaluated for multiple medical problems.  Patient admitted to SNF for therapy due to weakness after recent hospitalization.    HPI:  HPI  Patient presented to hospital, ED for episode of unresponsiveness.  Patient was admitted for further evaluation and medical management.  Patient was admitted to ICU.  Patient was reported to have episodes of agitation and restlessness at night.  Patient was reported to have seizure of unknown etiology, likely suspected from pneumonia that was treated with antibiotic therapy.  Patient to follow-up with neurology outpatient for management of seizures and dementia.  PT OT evaluated patient recommending therapy.  Patient was deemed stable for discharge to nursing facility.  Patient was seen and examined at bedside presenting no apparent distress.  Denies chest pain or shortness of breath.  Denies nausea or vomiting.    Past Medical History:   Diagnosis Date    Acute sinusitis, unspecified 06/10/2013    Acute sinusitis    Age-related osteoporosis without current pathological fracture 06/10/2013    Osteoporosis    Atrophic disorder of skin, unspecified 06/10/2013    Atrophoderma    Benign neoplasm of unspecified breast 06/10/2013    Benign neoplasm of female breast    Encounter for general adult medical examination without abnormal findings 11/12/2018    Encounter for annual health examination    Encounter for general adult medical examination without abnormal findings 11/12/2021    Encounter for annual health examination    Encounter for general adult medical examination without abnormal findings 06/21/2021    Encounter for annual health examination    Encounter for general adult medical examination without abnormal findings     Encounter for annual health examination    Encounter for screening mammogram for malignant neoplasm of breast     Visit for screening mammogram     Excessive daytime sleepiness 01/19/2023    Nausea 02/19/2014    Nausea    Other conditions influencing health status     Hemigastrectomy    Personal history of diseases of the skin and subcutaneous tissue 06/10/2013    History of skin disorder    Personal history of other diseases of the circulatory system     History of hypertension    Personal history of other diseases of the respiratory system 12/22/2017    History of bronchitis    Personal history of other endocrine, nutritional and metabolic disease     History of high cholesterol    Personal history of other specified conditions 08/13/2019    History of dizziness    Suicidal ideation 01/19/2023    Type 2 diabetes mellitus without complications (CMS/[facility]) 12/07/2022    Diabetes mellitus    Vertigo 01/19/2023       Past Surgical History:   Procedure Laterality Date    COLONOSCOPY  03/28/2013    Complete Colonoscopy    OTHER SURGICAL HISTORY  03/28/2013    Brain Surgery    OTHER SURGICAL HISTORY  03/28/2013    Excision Of Lesion Genitalia       Family History   Problem Relation Name Age of Onset    Alzheimer's disease Mother      Diabetes Mother      Sleep apnea Mother      Heart attack Father      Other (malignant neoplasm of breast) Sister      Other (Coronary Artery Surgery) Brother      Other (Multiple family members have migrane headaches) Other         Social History     Socioeconomic History    Marital status:      Spouse name: Not on file    Number of children: Not on file    Years of education: Not on file    Highest education level: Not on file   Occupational History    Not on file   Tobacco Use    Smoking status: Never    Smokeless tobacco: Never   Substance and Sexual Activity    Alcohol use: Not on file    Drug use: Not on file    Sexual activity: Not on file   Other Topics Concern    Not on file   Social History Narrative    Not on file     Social Determinants of Health     Financial Resource Strain: Not on file   Food Insecurity: Not on file    Transportation Needs: Not on file   Physical Activity: Not on file   Stress: Not on file   Social Connections: Not on file   Intimate Partner Violence: Not on file   Housing Stability: Not on file       Vital signs: 117/67, 98.3, 73, 18, blood sugar 87, 98%    Objective   Physical Exam  Constitutional:       General: She is not in acute distress.  Eyes:      Extraocular Movements: Extraocular movements intact.   Cardiovascular:      Rate and Rhythm: Normal rate and regular rhythm.   Pulmonary:      Effort: Pulmonary effort is normal.      Breath sounds: Normal breath sounds.   Abdominal:      General: Bowel sounds are normal.      Palpations: Abdomen is soft.   Musculoskeletal:      Cervical back: Neck supple.      Right lower leg: No edema.      Left lower leg: No edema.   Neurological:      Mental Status: She is alert.   Psychiatric:         Mood and Affect: Mood normal.         Behavior: Behavior is cooperative.         Assessment/Plan   Problem List Items Addressed This Visit       GERD (gastroesophageal reflux disease)     Monitor GI symptoms  PPI         Hypertension, essential     Monitor blood pressure         Partial symptomatic epilepsy with complex partial seizures, not intractable, with status epilepticus (CMS/[facility]) - Primary     Follow-up with neurology outpatient  Keppra  Ativan as needed  Lacosamide         Moderate Alzheimer's dementia with anxiety, unspecified timing of dementia onset (CMS/[facility])     Monitor for safety  Assist with care as needed  Follow-up with neurology outpatient  Aricept         HLD (hyperlipidemia)     Statin  Monitor labs          Hospital records reviewed  Medications, treatments, and labs reviewed  Continue medications and treatments as listed in EMR  Discussed with nursing and therapy      Scribe Attestation  I, Gretel Alcantaraibjazzmine   attest that this documentation has been prepared under the direction and in the presence of Christopher Jaime MD    Provider Attestation -  Scribe documentation  All medical record entries made by the Scribe were at my direction and personally dictated by me. I have reviewed the chart and agree that the record accurately reflects my personal performance of the history, physical exam, discussion and plan.   Christopher Jaime MD

## 2024-02-06 NOTE — LETTER
Patient: Estella Lanese  : 1950    Encounter Date: 2024    HISTORY & PHYSICAL    Subjective  Chief complaint: Estella M Lanese is a 74 y.o. female who is being seen and evaluated for multiple medical problems.  Patient admitted to SNF for therapy due to weakness after recent hospitalization.    HPI:  HPI  Patient presented to hospital, ED for episode of unresponsiveness.  Patient was admitted for further evaluation and medical management.  Patient was admitted to ICU.  Patient was reported to have episodes of agitation and restlessness at night.  Patient was reported to have seizure of unknown etiology, likely suspected from pneumonia that was treated with antibiotic therapy.  Patient to follow-up with neurology outpatient for management of seizures and dementia.  PT OT evaluated patient recommending therapy.  Patient was deemed stable for discharge to nursing facility.  Patient was seen and examined at bedside presenting no apparent distress.  Denies chest pain or shortness of breath.  Denies nausea or vomiting.    Past Medical History:   Diagnosis Date   • Acute sinusitis, unspecified 06/10/2013    Acute sinusitis   • Age-related osteoporosis without current pathological fracture 06/10/2013    Osteoporosis   • Atrophic disorder of skin, unspecified 06/10/2013    Atrophoderma   • Benign neoplasm of unspecified breast 06/10/2013    Benign neoplasm of female breast   • Encounter for general adult medical examination without abnormal findings 2018    Encounter for annual health examination   • Encounter for general adult medical examination without abnormal findings 2021    Encounter for annual health examination   • Encounter for general adult medical examination without abnormal findings 2021    Encounter for annual health examination   • Encounter for general adult medical examination without abnormal findings     Encounter for annual health examination   • Encounter for screening  mammogram for malignant neoplasm of breast     Visit for screening mammogram   • Excessive daytime sleepiness 01/19/2023   • Nausea 02/19/2014    Nausea   • Other conditions influencing health status     Hemigastrectomy   • Personal history of diseases of the skin and subcutaneous tissue 06/10/2013    History of skin disorder   • Personal history of other diseases of the circulatory system     History of hypertension   • Personal history of other diseases of the respiratory system 12/22/2017    History of bronchitis   • Personal history of other endocrine, nutritional and metabolic disease     History of high cholesterol   • Personal history of other specified conditions 08/13/2019    History of dizziness   • Suicidal ideation 01/19/2023   • Type 2 diabetes mellitus without complications (CMS/[facility]) 12/07/2022    Diabetes mellitus   • Vertigo 01/19/2023       Past Surgical History:   Procedure Laterality Date   • COLONOSCOPY  03/28/2013    Complete Colonoscopy   • OTHER SURGICAL HISTORY  03/28/2013    Brain Surgery   • OTHER SURGICAL HISTORY  03/28/2013    Excision Of Lesion Genitalia       Family History   Problem Relation Name Age of Onset   • Alzheimer's disease Mother     • Diabetes Mother     • Sleep apnea Mother     • Heart attack Father     • Other (malignant neoplasm of breast) Sister     • Other (Coronary Artery Surgery) Brother     • Other (Multiple family members have migrane headaches) Other         Social History     Socioeconomic History   • Marital status:      Spouse name: Not on file   • Number of children: Not on file   • Years of education: Not on file   • Highest education level: Not on file   Occupational History   • Not on file   Tobacco Use   • Smoking status: Never   • Smokeless tobacco: Never   Substance and Sexual Activity   • Alcohol use: Not on file   • Drug use: Not on file   • Sexual activity: Not on file   Other Topics Concern   • Not on file   Social History Narrative   • Not on  file     Social Determinants of Health     Financial Resource Strain: Not on file   Food Insecurity: Not on file   Transportation Needs: Not on file   Physical Activity: Not on file   Stress: Not on file   Social Connections: Not on file   Intimate Partner Violence: Not on file   Housing Stability: Not on file       Vital signs: 117/67, 98.3, 73, 18, blood sugar 87, 98%    Objective  Physical Exam  Constitutional:       General: She is not in acute distress.  Eyes:      Extraocular Movements: Extraocular movements intact.   Cardiovascular:      Rate and Rhythm: Normal rate and regular rhythm.   Pulmonary:      Effort: Pulmonary effort is normal.      Breath sounds: Normal breath sounds.   Abdominal:      General: Bowel sounds are normal.      Palpations: Abdomen is soft.   Musculoskeletal:      Cervical back: Neck supple.      Right lower leg: No edema.      Left lower leg: No edema.   Neurological:      Mental Status: She is alert.   Psychiatric:         Mood and Affect: Mood normal.         Behavior: Behavior is cooperative.         Assessment/Plan  Problem List Items Addressed This Visit       GERD (gastroesophageal reflux disease)     Monitor GI symptoms  PPI         Hypertension, essential     Monitor blood pressure         Partial symptomatic epilepsy with complex partial seizures, not intractable, with status epilepticus (CMS/[facility]) - Primary     Follow-up with neurology outpatient  Keppra  Ativan as needed  Lacosamide         Moderate Alzheimer's dementia with anxiety, unspecified timing of dementia onset (CMS/[facility])     Monitor for safety  Assist with care as needed  Follow-up with neurology outpatient  Aricept         HLD (hyperlipidemia)     Statin  Monitor labs          Hospital records reviewed  Medications, treatments, and labs reviewed  Continue medications and treatments as listed in EMR  Discussed with nursing and therapy      Kellen Attestation  DANIELLA, Kellen Alcantara   attest that this  documentation has been prepared under the direction and in the presence of Christopher Jaime MD    Provider Attestation - Scribe documentation  All medical record entries made by the Scribe were at my direction and personally dictated by me. I have reviewed the chart and agree that the record accurately reflects my personal performance of the history, physical exam, discussion and plan.   Christopher Jaime MD      Electronically Signed By: Christopher Jaime MD   2/6/24  4:46 PM

## 2024-02-09 ENCOUNTER — NURSING HOME VISIT (OUTPATIENT)
Dept: POST ACUTE CARE | Facility: EXTERNAL LOCATION | Age: 74
End: 2024-02-09
Payer: COMMERCIAL

## 2024-02-09 DIAGNOSIS — I10 HYPERTENSION, ESSENTIAL: ICD-10-CM

## 2024-02-09 DIAGNOSIS — F02.B4 MODERATE ALZHEIMER'S DEMENTIA WITH ANXIETY, UNSPECIFIED TIMING OF DEMENTIA ONSET (MULTI): Primary | ICD-10-CM

## 2024-02-09 DIAGNOSIS — G30.9 MODERATE ALZHEIMER'S DEMENTIA WITH ANXIETY, UNSPECIFIED TIMING OF DEMENTIA ONSET (MULTI): Primary | ICD-10-CM

## 2024-02-09 PROCEDURE — 99308 SBSQ NF CARE LOW MDM 20: CPT | Performed by: INTERNAL MEDICINE

## 2024-02-09 NOTE — ASSESSMENT & PLAN NOTE
Monitor for safety  Assist with care as needed  Follow-up with neurology outpatient  [facility]  Speech therapy

## 2024-02-09 NOTE — LETTER
Patient: Estella Lanese  : 1950    Encounter Date: 2024    PROGRESS NOTE    Subjective  Chief complaint: Estella M Lanese is a 74 y.o. female who is a long term care patient being seen and evaluated for weakness.    HPI:  HPI  Patient did have recent evaluation by speech therapy, patient is continue working with them to address cognition.  Patient was seen and examined at bedside, appears to be in no apparent distress.  Denies chest pain, shortness of breath.  Afebrile.  Staff report no new concerns at this time.    Objective  Vital signs: 117/67, 98.3, 73, 18, blood sugar 116, 98%    Physical Exam  Constitutional:       General: She is not in acute distress.  Eyes:      Extraocular Movements: Extraocular movements intact.   Cardiovascular:      Rate and Rhythm: Normal rate and regular rhythm.   Pulmonary:      Effort: Pulmonary effort is normal.      Breath sounds: Normal breath sounds.   Abdominal:      General: Bowel sounds are normal.      Palpations: Abdomen is soft.   Musculoskeletal:      Cervical back: Neck supple.      Right lower leg: No edema.      Left lower leg: No edema.   Neurological:      Mental Status: She is alert.      Motor: Weakness present.   Psychiatric:         Mood and Affect: Mood normal.         Behavior: Behavior is cooperative.         Assessment/Plan  Problem List Items Addressed This Visit       Hypertension, essential     Monitor blood pressure         Moderate Alzheimer's dementia with anxiety, unspecified timing of dementia onset (CMS/[facility]) - Primary     Monitor for safety  Assist with care as needed  Follow-up with neurology outpatient  [facility]  Speech therapy          Medications, treatments, and labs reviewed  Continue medications and treatments as listed in EMR    Scribe Attestation  I, Kellen Alcantara   attest that this documentation has been prepared under the direction and in the presence of Christopher Jaime MD    Provider Attestation - Kellen  documentation  All medical record entries made by the Scribe were at my direction and personally dictated by me. I have reviewed the chart and agree that the record accurately reflects my personal performance of the history, physical exam, discussion and plan.   Christopher Jaime MD            Electronically Signed By: Christopher Jaime MD   2/10/24  9:05 AM

## 2024-02-09 NOTE — PROGRESS NOTES
PROGRESS NOTE    Subjective   Chief complaint: Estella M Lanese is a 74 y.o. female who is a long term care patient being seen and evaluated for weakness.    HPI:  HPI  Patient did have recent evaluation by speech therapy, patient is continue working with them to address cognition.  Patient was seen and examined at bedside, appears to be in no apparent distress.  Denies chest pain, shortness of breath.  Afebrile.  Staff report no new concerns at this time.    Objective   Vital signs: 117/67, 98.3, 73, 18, blood sugar 116, 98%    Physical Exam  Constitutional:       General: She is not in acute distress.  Eyes:      Extraocular Movements: Extraocular movements intact.   Cardiovascular:      Rate and Rhythm: Normal rate and regular rhythm.   Pulmonary:      Effort: Pulmonary effort is normal.      Breath sounds: Normal breath sounds.   Abdominal:      General: Bowel sounds are normal.      Palpations: Abdomen is soft.   Musculoskeletal:      Cervical back: Neck supple.      Right lower leg: No edema.      Left lower leg: No edema.   Neurological:      Mental Status: She is alert.      Motor: Weakness present.   Psychiatric:         Mood and Affect: Mood normal.         Behavior: Behavior is cooperative.         Assessment/Plan   Problem List Items Addressed This Visit       Hypertension, essential     Monitor blood pressure         Moderate Alzheimer's dementia with anxiety, unspecified timing of dementia onset (CMS/[facility]) - Primary     Monitor for safety  Assist with care as needed  Follow-up with neurology outpatient  [facility]  Speech therapy          Medications, treatments, and labs reviewed  Continue medications and treatments as listed in EMR    Scribe Attestation  I, Kellen Alcantara   attest that this documentation has been prepared under the direction and in the presence of Christopher Jaime MD    Provider Attestation - Scribe documentation  All medical record entries made by the Scribe were at my direction  and personally dictated by me. I have reviewed the chart and agree that the record accurately reflects my personal performance of the history, physical exam, discussion and plan.   Christopher Jaime MD

## 2024-02-13 ENCOUNTER — NURSING HOME VISIT (OUTPATIENT)
Dept: POST ACUTE CARE | Facility: EXTERNAL LOCATION | Age: 74
End: 2024-02-13
Payer: COMMERCIAL

## 2024-02-13 DIAGNOSIS — G30.9 MODERATE ALZHEIMER'S DEMENTIA WITH ANXIETY, UNSPECIFIED TIMING OF DEMENTIA ONSET (MULTI): ICD-10-CM

## 2024-02-13 DIAGNOSIS — G40.201 PARTIAL SYMPTOMATIC EPILEPSY WITH COMPLEX PARTIAL SEIZURES, NOT INTRACTABLE, WITH STATUS EPILEPTICUS (MULTI): ICD-10-CM

## 2024-02-13 DIAGNOSIS — E11.9 TYPE 2 DIABETES MELLITUS WITHOUT COMPLICATION, WITHOUT LONG-TERM CURRENT USE OF INSULIN (MULTI): ICD-10-CM

## 2024-02-13 DIAGNOSIS — I10 HYPERTENSION, ESSENTIAL: ICD-10-CM

## 2024-02-13 DIAGNOSIS — R53.1 WEAKNESS: ICD-10-CM

## 2024-02-13 DIAGNOSIS — F02.B4 MODERATE ALZHEIMER'S DEMENTIA WITH ANXIETY, UNSPECIFIED TIMING OF DEMENTIA ONSET (MULTI): ICD-10-CM

## 2024-02-13 DIAGNOSIS — F32.A DEPRESSION, UNSPECIFIED DEPRESSION TYPE: ICD-10-CM

## 2024-02-13 DIAGNOSIS — K21.9 GASTROESOPHAGEAL REFLUX DISEASE WITHOUT ESOPHAGITIS: ICD-10-CM

## 2024-02-13 PROCEDURE — 99308 SBSQ NF CARE LOW MDM 20: CPT | Performed by: INTERNAL MEDICINE

## 2024-02-13 NOTE — ASSESSMENT & PLAN NOTE
Monitor for safety  Assist with care as needed  Follow-up with neurology outpatient  [facility]  Speech therapy

## 2024-02-13 NOTE — LETTER
Patient: Estella Lanese  : 1950    Encounter Date: 2024    PROGRESS NOTE    Subjective  Chief complaint: Estella M Lanese is a 74 y.o. female who is a long term care patient being seen and evaluated for weakness.     HPI:  Patient presents for f/u therapy and general medical care.  Patient seen and examined at beside.  Therapy has been working with the patient to improve strength, endurance, ADLs, and transfers d/t generalized weakness. She is ambulating 75-90' x 2 with RQ at [facility]. Transfers at [facility]-min assist. Patient has no acute concerns today.      Objective  Vital signs: 117/67,73,98%,     Physical Exam  Constitutional:       General: She is not in acute distress.  Eyes:      Extraocular Movements: Extraocular movements intact.   Cardiovascular:      Rate and Rhythm: Normal rate and regular rhythm.   Pulmonary:      Effort: Pulmonary effort is normal.      Breath sounds: Normal breath sounds.   Abdominal:      General: Bowel sounds are normal.      Palpations: Abdomen is soft.   Musculoskeletal:      Cervical back: Neck supple.      Right lower leg: No edema.      Left lower leg: No edema.   Neurological:      Mental Status: She is alert.      Motor: Weakness present.   Psychiatric:         Mood and Affect: Mood normal.         Behavior: Behavior is cooperative.         Assessment/Plan  Problem List Items Addressed This Visit       Depression     Continue meds  monitor for changes in mood         Type 2 diabetes mellitus without complication, without long-term current use of insulin (CMS/[facility])     Glucoscan  LCS diet         GERD (gastroesophageal reflux disease)     Monitor GI symptoms  PPI         Hypertension, essential     Monitor blood pressure         Partial symptomatic epilepsy with complex partial seizures, not intractable, with status epilepticus (CMS/[facility])     Follow-up with neurology outpatient  Keppra  Ativan as needed  Lacosamide         Moderate Alzheimer's dementia with anxiety,  unspecified timing of dementia onset (CMS/[facility])     Monitor for safety  Assist with care as needed  [facility]  Speech therapy         Weakness     Working with therapy  Actively participating  Continue therapy          Medications, treatments, and labs reviewed  Continue medications and treatments as listed in EMR      Scribe Attestation  Jessica BREWER Scribe   attest that this documentation has been prepared under the direction and in the presence of Christopher Jaime MD.     Provider Attestation - Scribe documentation  All medical record entries made by the Scribe were at my direction and personally dictated by me. I have reviewed the chart and agree that the record accurately reflects my personal performance of the history, physical exam, discussion and plan.   Christopher Jaime MD      Electronically Signed By: Christopher Jaime MD   2/13/24  3:25 PM

## 2024-02-13 NOTE — PROGRESS NOTES
ASAP referral pended.   PROGRESS NOTE    Subjective   Chief complaint: Estella M Lanese is a 74 y.o. female who is a long term care patient being seen and evaluated for weakness.     HPI:  Patient presents for f/u therapy and general medical care.  Patient seen and examined at beside.  Therapy has been working with the patient to improve strength, endurance, ADLs, and transfers d/t generalized weakness. She is ambulating 75-90' x 2 with RQ at [facility]. Transfers at [facility]-min assist. Patient has no acute concerns today.      Objective   Vital signs: 117/67,73,98%,     Physical Exam  Constitutional:       General: She is not in acute distress.  Eyes:      Extraocular Movements: Extraocular movements intact.   Cardiovascular:      Rate and Rhythm: Normal rate and regular rhythm.   Pulmonary:      Effort: Pulmonary effort is normal.      Breath sounds: Normal breath sounds.   Abdominal:      General: Bowel sounds are normal.      Palpations: Abdomen is soft.   Musculoskeletal:      Cervical back: Neck supple.      Right lower leg: No edema.      Left lower leg: No edema.   Neurological:      Mental Status: She is alert.      Motor: Weakness present.   Psychiatric:         Mood and Affect: Mood normal.         Behavior: Behavior is cooperative.         Assessment/Plan   Problem List Items Addressed This Visit       Depression     Continue meds  monitor for changes in mood         Type 2 diabetes mellitus without complication, without long-term current use of insulin (CMS/[facility])     Glucoscan  LCS diet         GERD (gastroesophageal reflux disease)     Monitor GI symptoms  PPI         Hypertension, essential     Monitor blood pressure         Partial symptomatic epilepsy with complex partial seizures, not intractable, with status epilepticus (CMS/[facility])     Follow-up with neurology outpatient  Keppra  Ativan as needed  Lacosamide         Moderate Alzheimer's dementia with anxiety, unspecified timing of dementia onset (CMS/[facility])     Monitor for  safety  Assist with care as needed  Follow-up with neurology outpatient  [facility]  Speech therapy         Weakness     Working with therapy  Actively participating  Continue therapy          Medications, treatments, and labs reviewed  Continue medications and treatments as listed in EMR      Scribe Attestation  Jessica BREWER Scribe   attest that this documentation has been prepared under the direction and in the presence of Christopher Jaime MD.     Provider Attestation - Scribe documentation  All medical record entries made by the Scribe were at my direction and personally dictated by me. I have reviewed the chart and agree that the record accurately reflects my personal performance of the history, physical exam, discussion and plan.   Christopher Jaime MD

## 2024-02-19 DIAGNOSIS — G40.909 NONINTRACTABLE EPILEPSY WITHOUT STATUS EPILEPTICUS, UNSPECIFIED EPILEPSY TYPE (MULTI): Primary | ICD-10-CM

## 2024-02-19 RX ORDER — LACOSAMIDE 100 MG/1
100 TABLET ORAL 2 TIMES DAILY
Qty: 60 TABLET | Refills: 5 | Status: SHIPPED | OUTPATIENT
Start: 2024-02-19

## 2024-02-20 ENCOUNTER — NURSING HOME VISIT (OUTPATIENT)
Dept: POST ACUTE CARE | Facility: EXTERNAL LOCATION | Age: 74
End: 2024-02-20
Payer: COMMERCIAL

## 2024-02-20 DIAGNOSIS — E11.9 TYPE 2 DIABETES MELLITUS WITHOUT COMPLICATION, WITHOUT LONG-TERM CURRENT USE OF INSULIN (MULTI): ICD-10-CM

## 2024-02-20 DIAGNOSIS — F32.A DEPRESSION, UNSPECIFIED DEPRESSION TYPE: ICD-10-CM

## 2024-02-20 DIAGNOSIS — G40.201 PARTIAL SYMPTOMATIC EPILEPSY WITH COMPLEX PARTIAL SEIZURES, NOT INTRACTABLE, WITH STATUS EPILEPTICUS (MULTI): ICD-10-CM

## 2024-02-20 DIAGNOSIS — F02.B4 MODERATE ALZHEIMER'S DEMENTIA WITH ANXIETY, UNSPECIFIED TIMING OF DEMENTIA ONSET (MULTI): ICD-10-CM

## 2024-02-20 DIAGNOSIS — K21.9 GASTROESOPHAGEAL REFLUX DISEASE WITHOUT ESOPHAGITIS: ICD-10-CM

## 2024-02-20 DIAGNOSIS — I10 HYPERTENSION, ESSENTIAL: ICD-10-CM

## 2024-02-20 DIAGNOSIS — G30.9 MODERATE ALZHEIMER'S DEMENTIA WITH ANXIETY, UNSPECIFIED TIMING OF DEMENTIA ONSET (MULTI): ICD-10-CM

## 2024-02-20 PROCEDURE — 99309 SBSQ NF CARE MODERATE MDM 30: CPT | Performed by: INTERNAL MEDICINE

## 2024-02-20 NOTE — PROGRESS NOTES
PROGRESS NOTE    Subjective   Chief complaint: Estella M Lanese is a 74 y.o. female who is a long term care patient being seen and evaluated for monthly general medical care and follow-up    HPI:  Patient presents for general medical care and f/u.  Patient seen and examined at bedside. Patient has dementia and requires assistance with ADLs. T2DM, Patient denies vision changes, excessive thirst, sweating, urinary frequency.  HTN, denies fatigue, sob, and palpitations. Patient states her depression has improved. Seizures controlled. No recent seizures controlled. No adverse effects noted tolerating antiepileptic medications. Denies changes in weakness or speech.  No issues per nursing.  Patient has no acute complaints.       Objective   Vital signs: 117/67,73,98%,     Physical Exam  Constitutional:       General: She is not in acute distress.  Eyes:      Extraocular Movements: Extraocular movements intact.   Cardiovascular:      Rate and Rhythm: Normal rate and regular rhythm.   Pulmonary:      Effort: Pulmonary effort is normal.      Breath sounds: Normal breath sounds.   Abdominal:      General: Bowel sounds are normal.      Palpations: Abdomen is soft.   Musculoskeletal:      Cervical back: Neck supple.      Right lower leg: No edema.      Left lower leg: No edema.   Neurological:      Mental Status: She is alert.      Motor: Weakness present.   Psychiatric:         Mood and Affect: Mood normal.         Behavior: Behavior is cooperative.         Assessment/Plan   Problem List Items Addressed This Visit       Depression     Continue meds  monitor for changes in mood         Type 2 diabetes mellitus without complication, without long-term current use of insulin (CMS/[facility])     Glucoscan  LCS diet         GERD (gastroesophageal reflux disease)     Monitor GI symptoms  PPI         Hypertension, essential     Monitor blood pressure         Partial symptomatic epilepsy with complex partial seizures, not intractable,  with status epilepticus (CMS/[facility])     Follow-up with neurology outpatient  Keppra  Ativan as needed  Lacosamide         Moderate Alzheimer's dementia with anxiety, unspecified timing of dementia onset (CMS/[facility])     Monitor for safety  Assist with care as needed  Follow-up with neurology outpatient  Aricept          Medications, treatments, and labs reviewed  Continue medications and treatments as listed in EMR      Scribe Attestation  I, Kellen Blum   attest that this documentation has been prepared under the direction and in the presence of Christopher Jaime MD.     Provider Attestation - Scribe documentation  All medical record entries made by the Scribe were at my direction and personally dictated by me. I have reviewed the chart and agree that the record accurately reflects my personal performance of the history, physical exam, discussion and plan.   Christopher Jaime MD

## 2024-02-20 NOTE — LETTER
Patient: Estella Lanese  : 1950    Encounter Date: 2024    PROGRESS NOTE    Subjective  Chief complaint: Estella M Lanese is a 74 y.o. female who is a long term care patient being seen and evaluated for monthly general medical care and follow-up    HPI:  Patient presents for general medical care and f/u.  Patient seen and examined at bedside. Patient has dementia and requires assistance with ADLs. T2DM, Patient denies vision changes, excessive thirst, sweating, urinary frequency.  HTN, denies fatigue, sob, and palpitations. Patient states her depression has improved. Seizures controlled. No recent seizures controlled. No adverse effects noted tolerating antiepileptic medications. Denies changes in weakness or speech.  No issues per nursing.  Patient has no acute complaints.       Objective  Vital signs: 117/67,73,98%,     Physical Exam  Constitutional:       General: She is not in acute distress.  Eyes:      Extraocular Movements: Extraocular movements intact.   Cardiovascular:      Rate and Rhythm: Normal rate and regular rhythm.   Pulmonary:      Effort: Pulmonary effort is normal.      Breath sounds: Normal breath sounds.   Abdominal:      General: Bowel sounds are normal.      Palpations: Abdomen is soft.   Musculoskeletal:      Cervical back: Neck supple.      Right lower leg: No edema.      Left lower leg: No edema.   Neurological:      Mental Status: She is alert.      Motor: Weakness present.   Psychiatric:         Mood and Affect: Mood normal.         Behavior: Behavior is cooperative.         Assessment/Plan  Problem List Items Addressed This Visit       Depression     Continue meds  monitor for changes in mood         Type 2 diabetes mellitus without complication, without long-term current use of insulin (CMS/[facility])     Glucoscan  LCS diet         GERD (gastroesophageal reflux disease)     Monitor GI symptoms  PPI         Hypertension, essential     Monitor blood pressure         Partial  symptomatic epilepsy with complex partial seizures, not intractable, with status epilepticus (CMS/[facility])     Follow-up with neurology outpatient  Keppra  Ativan as needed  Lacosamide         Moderate Alzheimer's dementia with anxiety, unspecified timing of dementia onset (CMS/[facility])     Monitor for safety  Assist with care as needed  Follow-up with neurology outpatient  Aricept          Medications, treatments, and labs reviewed  Continue medications and treatments as listed in EMR      Scribe Attestation  I, Kellen Blum   attest that this documentation has been prepared under the direction and in the presence of Christopher Jaime MD.     Provider Attestation - Scribe documentation  All medical record entries made by the Scribe were at my direction and personally dictated by me. I have reviewed the chart and agree that the record accurately reflects my personal performance of the history, physical exam, discussion and plan.   Christopher Jaime MD      Electronically Signed By: Christopher Jaime MD   2/20/24  5:58 PM

## 2024-02-22 ENCOUNTER — NURSING HOME VISIT (OUTPATIENT)
Dept: POST ACUTE CARE | Facility: EXTERNAL LOCATION | Age: 74
End: 2024-02-22
Payer: COMMERCIAL

## 2024-02-22 DIAGNOSIS — G30.9 MODERATE ALZHEIMER'S DEMENTIA WITH ANXIETY, UNSPECIFIED TIMING OF DEMENTIA ONSET (MULTI): Primary | ICD-10-CM

## 2024-02-22 DIAGNOSIS — E11.9 TYPE 2 DIABETES MELLITUS WITHOUT COMPLICATION, WITHOUT LONG-TERM CURRENT USE OF INSULIN (MULTI): ICD-10-CM

## 2024-02-22 DIAGNOSIS — I10 HYPERTENSION, ESSENTIAL: ICD-10-CM

## 2024-02-22 DIAGNOSIS — F02.B4 MODERATE ALZHEIMER'S DEMENTIA WITH ANXIETY, UNSPECIFIED TIMING OF DEMENTIA ONSET (MULTI): Primary | ICD-10-CM

## 2024-02-22 DIAGNOSIS — F32.A DEPRESSION, UNSPECIFIED DEPRESSION TYPE: ICD-10-CM

## 2024-02-22 PROCEDURE — 99309 SBSQ NF CARE MODERATE MDM 30: CPT | Performed by: NURSE PRACTITIONER

## 2024-02-22 NOTE — LETTER
Patient: Estella Lanese  : 1950    Encounter Date: 2024    PROGRESS NOTE    Subjective  Chief complaint: Estella M Lanese is a 74 y.o. female who is a long term care patient being seen and evaluated for depression.    HPI:  HPI  Patient is seen for reports of patient being tearful, refusing medications.  Patient was seen and examined at the bedside.     Objective  Vital signs: 128/66, 97.3, 60, 16, blood sugar 114, 98%    Physical Exam  Constitutional:       General: She is not in acute distress.  Eyes:      Extraocular Movements: Extraocular movements intact.   Cardiovascular:      Rate and Rhythm: Normal rate and regular rhythm.   Pulmonary:      Effort: Pulmonary effort is normal.      Breath sounds: Normal breath sounds.   Abdominal:      General: Bowel sounds are normal.      Palpations: Abdomen is soft.   Musculoskeletal:      Cervical back: Neck supple.      Right lower leg: No edema.      Left lower leg: No edema.   Neurological:      Mental Status: She is alert.      Motor: Weakness present.   Psychiatric:         Behavior: Behavior is cooperative.      Comments: Tearful          Assessment/Plan  Problem List Items Addressed This Visit       Depression     Patient unable to elaborate d/t dementia  Psychology consult  Obtain BMP, CBC, UA C&S         Hypertension, essential     Monitor blood pressure  BP at goal         Moderate Alzheimer's dementia with anxiety, unspecified timing of dementia onset (CMS/[facility]) - Primary     Monitor for safety  Assist with care as needed  Follow-up with neurology outpatient  Aricept         Type 2 diabetes mellitus without complication, without long-term current use of insulin (CMS/[facility])     3/3/2023 A1c 5.3  FBG at goal  Not currently on any medications for diabetes  Monitor          Medications, treatments, and labs reviewed  Continue medications and treatments as listed in EMR    Scribe Attestation  I, Jocelyne George, Scribe   attest that this documentation has  been prepared under the direction and in the presence of SHERRI Cherry    Provider Attestation - Scribe documentation  All medical record entries made by the Scribe were at my direction and personally dictated by me. I have reviewed the chart and agree that the record accurately reflects my personal performance of the history, physical exam, discussion and plan.   SHERRI Cherry            Electronically Signed By: SHERRI Cherry   2/28/24  5:27 PM

## 2024-02-23 ENCOUNTER — NURSING HOME VISIT (OUTPATIENT)
Dept: POST ACUTE CARE | Facility: EXTERNAL LOCATION | Age: 74
End: 2024-02-23
Payer: COMMERCIAL

## 2024-02-23 DIAGNOSIS — F02.B4 MODERATE ALZHEIMER'S DEMENTIA WITH ANXIETY, UNSPECIFIED TIMING OF DEMENTIA ONSET (MULTI): ICD-10-CM

## 2024-02-23 DIAGNOSIS — G30.9 MODERATE ALZHEIMER'S DEMENTIA WITH ANXIETY, UNSPECIFIED TIMING OF DEMENTIA ONSET (MULTI): ICD-10-CM

## 2024-02-23 DIAGNOSIS — Z02.79 ENCOUNTER FOR EVALUATION OF ABILITY TO MAKE DECISIONS REGARDING CARE: Primary | ICD-10-CM

## 2024-02-23 PROCEDURE — 99308 SBSQ NF CARE LOW MDM 20: CPT | Performed by: INTERNAL MEDICINE

## 2024-02-23 NOTE — LETTER
Patient: Estella Lanese  : 1950    Encounter Date: 2024    PROGRESS NOTE    Subjective  Chief complaint: Estella M Lanese is a 74 y.o. female who is a long term care patient being seen and evaluated for expert evaluation    HPI:  HPI  Patient is seen for expert evaluation for managing benefits and care.  Patient is not capable to manage benefits or care due to multiple comorbidities.  Patient seen and examined at bedside.    Objective  Vital signs: 117/67, 73, 98%     Physical Exam  Constitutional:       General: She is not in acute distress.  Eyes:      Extraocular Movements: Extraocular movements intact.   Cardiovascular:      Rate and Rhythm: Normal rate and regular rhythm.   Pulmonary:      Effort: Pulmonary effort is normal.      Breath sounds: Normal breath sounds.   Abdominal:      General: Bowel sounds are normal.      Palpations: Abdomen is soft.   Musculoskeletal:      Cervical back: Neck supple.      Right lower leg: No edema.      Left lower leg: No edema.   Neurological:      Mental Status: She is alert.   Psychiatric:         Mood and Affect: Mood normal.         Behavior: Behavior is cooperative.         Assessment/Plan  Problem List Items Addressed This Visit       Moderate Alzheimer's dementia with anxiety, unspecified timing of dementia onset (CMS/[facility])     Monitor for safety  Assist with care as needed  Follow-up with neurology outpatient  Aricept  Speech therapy         Encounter for evaluation of ability to make decisions regarding care - Primary     Medications, treatments, and labs reviewed  Continue medications and treatments as listed in EMR    Scribe Attestation  IJocelyne Scribe   attest that this documentation has been prepared under the direction and in the presence of Christopher Jaime MD    Provider Attestation - Scribe documentation  All medical record entries made by the Scribe were at my direction and personally dictated by me. I have reviewed the chart and agree  that the record accurately reflects my personal performance of the history, physical exam, discussion and plan.   Christopher Jaime MD            Electronically Signed By: Christopher Jaime MD   2/26/24  7:47 PM

## 2024-02-26 PROBLEM — Z02.79 ENCOUNTER FOR EVALUATION OF ABILITY TO MAKE DECISIONS REGARDING CARE: Status: ACTIVE | Noted: 2024-02-26

## 2024-02-26 NOTE — PROGRESS NOTES
PROGRESS NOTE    Subjective   Chief complaint: Estella M Lanese is a 74 y.o. female who is a long term care patient being seen and evaluated for expert evaluation    HPI:  HPI  Patient is seen for expert evaluation for managing benefits and care.  Patient is not capable to manage benefits or care due to multiple comorbidities.  Patient seen and examined at bedside.    Objective   Vital signs: 117/67, 73, 98%     Physical Exam  Constitutional:       General: She is not in acute distress.  Eyes:      Extraocular Movements: Extraocular movements intact.   Cardiovascular:      Rate and Rhythm: Normal rate and regular rhythm.   Pulmonary:      Effort: Pulmonary effort is normal.      Breath sounds: Normal breath sounds.   Abdominal:      General: Bowel sounds are normal.      Palpations: Abdomen is soft.   Musculoskeletal:      Cervical back: Neck supple.      Right lower leg: No edema.      Left lower leg: No edema.   Neurological:      Mental Status: She is alert.   Psychiatric:         Mood and Affect: Mood normal.         Behavior: Behavior is cooperative.         Assessment/Plan   Problem List Items Addressed This Visit       Moderate Alzheimer's dementia with anxiety, unspecified timing of dementia onset (CMS/[facility])     Monitor for safety  Assist with care as needed  Follow-up with neurology outpatient  Aricept  Speech therapy         Encounter for evaluation of ability to make decisions regarding care - Primary     Medications, treatments, and labs reviewed  Continue medications and treatments as listed in EMR    Scribe Attestation  I, Kellen Alcantara   attest that this documentation has been prepared under the direction and in the presence of Christopher Jaime MD    Provider Attestation - Scribe documentation  All medical record entries made by the Scribe were at my direction and personally dictated by me. I have reviewed the chart and agree that the record accurately reflects my personal performance of the  history, physical exam, discussion and plan.   Christohper Jaime MD

## 2024-02-26 NOTE — ASSESSMENT & PLAN NOTE
Monitor for safety  Assist with care as needed  Follow-up with neurology outpatient  [facility]  Speech therapy

## 2024-02-27 ENCOUNTER — NURSING HOME VISIT (OUTPATIENT)
Dept: POST ACUTE CARE | Facility: EXTERNAL LOCATION | Age: 74
End: 2024-02-27
Payer: COMMERCIAL

## 2024-02-27 DIAGNOSIS — I10 HYPERTENSION, ESSENTIAL: ICD-10-CM

## 2024-02-27 DIAGNOSIS — R53.1 WEAKNESS: ICD-10-CM

## 2024-02-27 DIAGNOSIS — K21.9 GASTROESOPHAGEAL REFLUX DISEASE WITHOUT ESOPHAGITIS: ICD-10-CM

## 2024-02-27 DIAGNOSIS — E11.9 TYPE 2 DIABETES MELLITUS WITHOUT COMPLICATION, WITHOUT LONG-TERM CURRENT USE OF INSULIN (MULTI): ICD-10-CM

## 2024-02-27 DIAGNOSIS — F02.B4 MODERATE ALZHEIMER'S DEMENTIA WITH ANXIETY, UNSPECIFIED TIMING OF DEMENTIA ONSET (MULTI): ICD-10-CM

## 2024-02-27 DIAGNOSIS — G30.9 MODERATE ALZHEIMER'S DEMENTIA WITH ANXIETY, UNSPECIFIED TIMING OF DEMENTIA ONSET (MULTI): ICD-10-CM

## 2024-02-27 DIAGNOSIS — F32.A DEPRESSION, UNSPECIFIED DEPRESSION TYPE: ICD-10-CM

## 2024-02-27 PROCEDURE — 99308 SBSQ NF CARE LOW MDM 20: CPT | Performed by: INTERNAL MEDICINE

## 2024-02-27 NOTE — LETTER
Patient: Estella Lanese  : 1950    Encounter Date: 2024    PROGRESS NOTE    Subjective  Chief complaint: Estella M Lanese is a 74 y.o. female who is a long term care patient being seen and evaluated for weakness.     HPI:  Patient has been working in therapy to improve strength, endurance, and ADLs.  Patient continues to work toward goals. Pt perfromed seated open chain ther ex with vc's for initation and follow through. Pt stood without AD with F- blance for short periods of time and then with F- with BUE support due to slight unsteadiness today. Gait trng to improve her gait. Pateint ambulated  ft x 2 with a RW and CGA/Min A. She performed several pivots with CGA/ Min A.  No new concerns today.  Denies n/v/f/c pain.        Objective  Vital signs: 116/54,64,95%,     Physical Exam  Constitutional:       General: She is not in acute distress.  Eyes:      Extraocular Movements: Extraocular movements intact.   Cardiovascular:      Rate and Rhythm: Normal rate and regular rhythm.   Pulmonary:      Effort: Pulmonary effort is normal.      Breath sounds: Normal breath sounds.   Abdominal:      General: Bowel sounds are normal.      Palpations: Abdomen is soft.   Musculoskeletal:      Cervical back: Neck supple.      Right lower leg: No edema.      Left lower leg: No edema.   Neurological:      Mental Status: She is alert.   Psychiatric:         Mood and Affect: Mood normal.         Behavior: Behavior is cooperative.         Assessment/Plan  Problem List Items Addressed This Visit       Depression     Psychology consult  Obtain BMP, CBC, UA C&S  Monitor          Type 2 diabetes mellitus without complication, without long-term current use of insulin (CMS/[facility])     Glucoscan  LCS diet         GERD (gastroesophageal reflux disease)     Monitor GI symptoms  PPI         Hypertension, essential     Monitor blood pressure           Moderate Alzheimer's dementia with anxiety, unspecified timing of dementia  onset (CMS/[facility])     Monitor for safety  Assist with care as needed  Follow-up with neurology outpatient  Aricept         Weakness     Working with therapy  Actively participating  Continue therapy          Medications, treatments, and labs reviewed  Continue medications and treatments as listed in EMR      Scribe Attestation  Jessica BREWER Scribe   attest that this documentation has been prepared under the direction and in the presence of Christopher Jaime MD.     Provider Attestation - Scribe documentation  All medical record entries made by the Scribe were at my direction and personally dictated by me. I have reviewed the chart and agree that the record accurately reflects my personal performance of the history, physical exam, discussion and plan.   Christopher Jaime MD      Electronically Signed By: Christopher Jaime MD   2/27/24  7:18 PM

## 2024-02-27 NOTE — PROGRESS NOTES
PROGRESS NOTE    Subjective   Chief complaint: Estella M Lanese is a 74 y.o. female who is a long term care patient being seen and evaluated for depression.    HPI:  HPI  Patient is seen for reports of patient being tearful, refusing medications.  Patient was seen and examined at the bedside.     Objective   Vital signs: 128/66, 97.3, 60, 16, blood sugar 114, 98%    Physical Exam  Constitutional:       General: She is not in acute distress.  Eyes:      Extraocular Movements: Extraocular movements intact.   Cardiovascular:      Rate and Rhythm: Normal rate and regular rhythm.   Pulmonary:      Effort: Pulmonary effort is normal.      Breath sounds: Normal breath sounds.   Abdominal:      General: Bowel sounds are normal.      Palpations: Abdomen is soft.   Musculoskeletal:      Cervical back: Neck supple.      Right lower leg: No edema.      Left lower leg: No edema.   Neurological:      Mental Status: She is alert.      Motor: Weakness present.   Psychiatric:         Behavior: Behavior is cooperative.      Comments: Tearful          Assessment/Plan   Problem List Items Addressed This Visit       Depression     Patient unable to elaborate d/t dementia  Psychology consult  Obtain BMP, CBC, UA C&S         Hypertension, essential     Monitor blood pressure  BP at goal         Moderate Alzheimer's dementia with anxiety, unspecified timing of dementia onset (CMS/[facility]) - Primary     Monitor for safety  Assist with care as needed  Follow-up with neurology outpatient  Aricept         Type 2 diabetes mellitus without complication, without long-term current use of insulin (CMS/[facility])     3/3/2023 A1c 5.3  FBG at goal  Not currently on any medications for diabetes  Monitor          Medications, treatments, and labs reviewed  Continue medications and treatments as listed in EMR    Scribe Attestation  DANIELLA, Kellen Alcantara   attest that this documentation has been prepared under the direction and in the presence of Jeanette KHAN  SHERRI Arellano    Provider Attestation - Scribe documentation  All medical record entries made by the Scribe were at my direction and personally dictated by me. I have reviewed the chart and agree that the record accurately reflects my personal performance of the history, physical exam, discussion and plan.   SHERRI Cherry

## 2024-02-27 NOTE — PROGRESS NOTES
[facility] - Short Stay Cardiac Unit  Cardiac Electrophysiology  Discharge Summary      Patient Name: Ibrahima Phelps  MRN: [id number]  Admission Date: 3/28/2022  Hospital Length of Stay: 0 days  Discharge Date and Time:  03/28/2022 1:12 PM  Attending Physician: Migue Blunt MD    Discharging Provider: Tere Dillon NP  Primary Care Physician: Connie Magdaleno MD    HPI:   Ibrahima Phelps presents to short stay cardiac unit on 03/28/2022 for planned cardioversion with Dr. Blunt.     Mr. Phelps is a 69 y.o. male with HLD, HTN, CAD status-post PCI in 2006, and kidney transplant in 2005 (most recent Cr 2.3 in 1/2022), whose history of AF dates back to 4/2015 when he presented to [facility] with sudden onset palpitations and was found to be in AF with RVR. He was cardioverted and discharged on anticoagulation. He had recurrent AF in 5/2017 which prompted an ED visit--he spontaneously reverted to sinus rhythm. He was recently seen in cardiology clinic where he was found to be back in AF at 100 bpm.      Mr. Phelps is treated with toprol xl 25 mg daily, and eliquis 5 mg bid. A cardiac SPECT in 6/2020 was negative for ischemia. An echo from 3/2022 showed an EF of 55% and moderate LAE. Mr. Phelps reports worsening DAUGHERTY since AF recurred. At visit with Dr. Blunt he remained in AF with RVR. Given ongoing treatment with tacrolimus/MMF, as well as baseline renal impairment with Cr 2.3 (CrCl 40), AAD therapy options are limited. Recommend starting flecainide 100 mg BID and then perform DCCV 3 days later. Mr. Phelps is anticoagulated on eliquis and he denies any missed doses.     EKG:  My independent visualization of most recent EKG is AF at 77bpm        Procedure(s) (LRB):  CARDIOVERSION (N/A)     Indwelling Lines/Drains at time of discharge:  Lines/Drains/Airways     None             Hospital Course:  Mr. Phelps underwent DCCV 200 J x 1 shock, converting from AF to sinus rhythm. He tolerated the procedure without any acute complications.   PROGRESS NOTE    Subjective   Chief complaint: Estella M Lanese is a 74 y.o. female who is a long term care patient being seen and evaluated for weakness.     HPI:  Patient has been working in therapy to improve strength, endurance, and ADLs.  Patient continues to work toward goals. Pt perfromed seated open chain ther ex with vc's for initation and follow through. Pt stood without AD with F- blance for short periods of time and then with F- with BUE support due to slight unsteadiness today. Gait trng to improve her gait. Pateint ambulated  ft x 2 with a RW and CGA/Min A. She performed several pivots with CGA/ Min A.  No new concerns today.  Denies n/v/f/c pain.        Objective   Vital signs: 116/54,64,95%,     Physical Exam  Constitutional:       General: She is not in acute distress.  Eyes:      Extraocular Movements: Extraocular movements intact.   Cardiovascular:      Rate and Rhythm: Normal rate and regular rhythm.   Pulmonary:      Effort: Pulmonary effort is normal.      Breath sounds: Normal breath sounds.   Abdominal:      General: Bowel sounds are normal.      Palpations: Abdomen is soft.   Musculoskeletal:      Cervical back: Neck supple.      Right lower leg: No edema.      Left lower leg: No edema.   Neurological:      Mental Status: She is alert.   Psychiatric:         Mood and Affect: Mood normal.         Behavior: Behavior is cooperative.         Assessment/Plan   Problem List Items Addressed This Visit       Depression     Psychology consult  Obtain BMP, CBC, UA C&S  Monitor          Type 2 diabetes mellitus without complication, without long-term current use of insulin (CMS/[facility])     Glucoscan  LCS diet         GERD (gastroesophageal reflux disease)     Monitor GI symptoms  PPI         Hypertension, essential     Monitor blood pressure           Moderate Alzheimer's dementia with anxiety, unspecified timing of dementia onset (CMS/[facility])     Monitor for safety  Assist with care as  Post-DCCV ECG revealing of sinus bradycardia. Plan to continue PTA medications including flecainide and eliquis.Instructed to return in 1 week for ECG and in 4 weeks for clinic follow up.   Mr. Phelps was assessed at bedside prior to discharge. He reported feeling well and denied chest discomfort, shortness of breath, palpitations, lightheadedness, or any other acute symptoms. Discharge instructions were discussed and all questions were answered. Mr. Phelps was discharged home in stable condition.     Pending Diagnostic Studies:     Procedure Component Value Units Date/Time    EKG 12-LEAD [[id number]] Collected: 03/28/22 1252    Order Status: Sent Lab Status: In process Updated: 03/28/22 1253    Narrative:      Test Reason : I48.91,    Vent. Rate : 053 BPM     Atrial Rate : 053 BPM     P-R Int : 186 ms          QRS Dur : 102 ms      QT Int : 434 ms       P-R-T Axes : 059 022 109 degrees     QTc Int : 407 ms    Sinus bradycardia with Premature supraventricular complexes  LVH with repolarization abnormality  Abnormal ECG  When compared with ECG of 28-MAR-2022 11:20,  Sinus rhythm has replaced Atrial fibrillation  Nonspecific T wave abnormality, improved in Inferior leads    Referred By: DWAYNE HARRISON           Confirmed By:           Final Active Diagnoses:    Diagnosis Date Noted POA    Atrial fibrillation [I48.91] 09/25/2017 Yes      Problems Resolved During this Admission:     Atrial fibrillation  s/p DCCV with restoration of sinus rhythm    Plan:  - Continue PTA medications  - AAD: continue flecainide 100 mg BID  - Follow up with ECG in 1 week (4/4/22)  - Follow up with Dr. PRASAD Blunt ~ 4 weeks  - Discharge plans/instructions discussed with patient who verbalized understanding and agreement of plans of care. No further questions or concerns  voiced at this time.            Discharged Condition: good    Disposition: Home or Self Care    Follow Up:   Follow-up Information     Migue Blunt MD Follow up in 4 week(s).   needed  Follow-up with neurology outpatient  Aricept         Weakness     Working with therapy  Actively participating  Continue therapy          Medications, treatments, and labs reviewed  Continue medications and treatments as listed in EMR      Scribe Attestation  I, Kellen Blum   attest that this documentation has been prepared under the direction and in the presence of Christopher Jaime MD.     Provider Attestation - Scribe documentation  All medical record entries made by the Scribe were at my direction and personally dictated by me. I have reviewed the chart and agree that the record accurately reflects my personal performance of the history, physical exam, discussion and plan.   Christopher Jaime MD       Specialties: Electrophysiology, Cardiovascular Disease, Cardiology  Why: s/p RODRIGO/DCCV  Contact information:  [address]  [facility] [address]  [phone]                       Patient Instructions:      Other restrictions (specify):   Order Comments: Because you have received sedation for this procedure:  -Limit activity for the remainder of the day.  -Do not smoke for at least 6 hours and until you are fully awake and alert.  -Do not drink alcoholic beverage for 24 hours.  -Do not drive for 24 hours.  -Defer important decision making until the following day.     Go to the Emergency Department if you develop:   -Bleeding  -Weakness or numbness  -Visual, gait or speech disturbance  -New chest pain, palpitations, shortness of breath, rapid heart beat, or fainting  -Fever    If you have a pacemaker, defibrillator or loop recorder that is monitored remotely by the [facility] clinic, you may be eligible to send in a remote transmission on the day of your EKG appointment in lieu of the EKG. You will be informed at discharge if this is an option. If you are instructed to send in a remote transmission, please send a manual transmission from your home monitor one week after your procedure and then call the clinic at [phone], option 4, to confirm that your remote transmission was received.      Any need to reschedule or cancel procedures, or any questions regarding your procedures should be addressed directly with the Arrhythmia Department Nurses at the following phone number: [phone].     Notify your health care provider if you experience any of the following:  difficulty breathing or increased cough     Notify your health care provider if you experience any of the following:  persistent dizziness, light-headedness, or visual disturbances     Activity as tolerated     Medications:  Reconciled Home Medications:      Medication List      CHANGE how you take these medications    flecainide 100 MG  Tab  Commonly known as: TAMBOCOR  Take 1 tablet (100 mg total) by mouth every 12 (twelve) hours.  What changed: Another medication with the same name was removed. Continue taking this medication, and follow the directions you see here.     mycophenolate 250 mg Cap  Commonly known as: CELLCEPT  TAKE 2 CAPSULES (500 MG TOTAL) BY MOUTH 2 (TWO) TIMES DAILY.  What changed: Another medication with the same name was removed. Continue taking this medication, and follow the directions you see here.     NIFEdipine 60 MG (OSM) 24 hr tablet  Commonly known as: PROCARDIA-XL  Take 2 tablets (120 mg total) by mouth once daily.  What changed: when to take this     predniSONE 5 MG tablet  Commonly known as: DELTASONE  TAKE 1 TABLET BY MOUTH EVERY DAY IN THE MORNING  What changed: Another medication with the same name was removed. Continue taking this medication, and follow the directions you see here.        CONTINUE taking these medications    ammonium lactate 12 % Crea  Apply 1 g topically once daily.     apixaban 5 mg Tab  Commonly known as: ELIQUIS  Take 1 tablet (5 mg total) by mouth 2 (two) times daily.     aspirin 81 MG EC tablet  Commonly known as: ECOTRIN  Take 1 tablet by mouth Daily.     atorvastatin 20 MG tablet  Commonly known as: LIPITOR  Take 1 tablet (20 mg total) by mouth once daily.     calcium carbonate 500 mg calcium (1,250 mg) tablet  Commonly known as: OS-TRISH  TAKE 2 TABLETS BY MOUTH TWICE A DAY     doxazosin 4 MG tablet  Commonly known as: CARDURA  Take 1 tablet (4 mg total) by mouth every 12 (twelve) hours.     famotidine 20 MG tablet  Commonly known as: PEPCID  Take 1 tablet (20 mg total) by mouth 2 (two) times daily.     fexofenadine 60 MG tablet  Commonly known as: ALLEGRA  Take 1 tablet by mouth Twice daily as needed.     FLUAD QUAD 2021-22(65Y UP)(PF) 60 mcg (15 mcg x 4)/0.5 mL Syrg  Generic drug: flu vac 2021 65up-[id number](PF)     fluticasone propionate 50 mcg/actuation nasal spray  Commonly known as:  FLONASE  1 spray (50 mcg total) by Each Nostril route once daily.     furosemide 40 MG tablet  Commonly known as: LASIX  Take 0.5 tablets (20 mg total) by mouth 2 (two) times daily.     gabapentin 300 MG capsule  Commonly known as: NEURONTIN  TAKE 1 CAPSULE BY MOUTH TWICE A DAY     hydrALAZINE 50 MG tablet  Commonly known as: APRESOLINE  Take 1 tablet (50 mg total) by mouth 3 (three) times daily.     metoprolol succinate 25 MG 24 hr tablet  Commonly known as: TOPROL-XL  Take 1 tablet (25 mg total) by mouth once daily.     multivitamin per tablet  Commonly known as: THERAGRAN  Take 1 tablet by mouth Daily.     nicotine 21 mg/24 hr  Commonly known as: NICODERM CQ  Place 1 patch onto the skin once daily.     paricalcitoL 1 MCG capsule  Commonly known as: ZEMPLAR  TAKE 1 TABLET BY MOUTH ON MONDAY, WEDNESDAY, & FRIDAY     potassium chloride 10 MEQ Tbsr  Commonly known as: KLOR-CON 10  Take 1 tablet (10 mEq total) by mouth once daily.     spironolactone 25 MG tablet  Commonly known as: ALDACTONE  Take 1 tablet (25 mg total) by mouth once daily.     tacrolimus 1 MG Cap  Commonly known as: PROGRAF  Two po in am and one po in pm     vitamin D 1000 units Tab  Commonly known as: VITAMIN D3  Take 370 mg by mouth 2 (two) times daily. 2 tablets BID        STOP taking these medications    albuterol 1.25 mg/3 mL Nebu  Commonly known as: ACCUNEB            Time spent on the discharge of patient: 15 minutes    Tere Dillon NP  Cardiac Electrophysiology  [facility] - Short Stay Cardiac Unit

## 2024-03-01 ENCOUNTER — NURSING HOME VISIT (OUTPATIENT)
Dept: POST ACUTE CARE | Facility: EXTERNAL LOCATION | Age: 74
End: 2024-03-01
Payer: COMMERCIAL

## 2024-03-01 DIAGNOSIS — I10 HYPERTENSION, ESSENTIAL: ICD-10-CM

## 2024-03-01 DIAGNOSIS — F02.B4 MODERATE ALZHEIMER'S DEMENTIA WITH ANXIETY, UNSPECIFIED TIMING OF DEMENTIA ONSET (MULTI): ICD-10-CM

## 2024-03-01 DIAGNOSIS — R53.1 WEAKNESS: Primary | ICD-10-CM

## 2024-03-01 DIAGNOSIS — K21.9 GASTROESOPHAGEAL REFLUX DISEASE WITHOUT ESOPHAGITIS: ICD-10-CM

## 2024-03-01 DIAGNOSIS — G30.9 MODERATE ALZHEIMER'S DEMENTIA WITH ANXIETY, UNSPECIFIED TIMING OF DEMENTIA ONSET (MULTI): ICD-10-CM

## 2024-03-01 PROCEDURE — 99308 SBSQ NF CARE LOW MDM 20: CPT | Performed by: INTERNAL MEDICINE

## 2024-03-01 NOTE — PROGRESS NOTES
PROGRESS NOTE    Subjective   Chief complaint: Estella M Lanese is a 74 y.o. female who is a long term care patient being seen and evaluated for weakness.    HPI:  HPI  Patient is working in therapy due to generalized weakness.  Patient is working on seated balance activities to increase core strength and improve all mobility.  Patient is ambulating with 2 wheeled walker and CGA.  Demonstrating decreased step height and length with right lower extremity only.  Patient is performing transfers requiring CGA to min assist and verbal cues for safety.  Patient seen and examined at bedside, appears to be in no acute distress.  Denies chest pain or shortness of breath.  Nursing staff voicing no new concerns.  Mentation at baseline.    Objective   Vital signs: 116/54, 97.2, 64, 18, blood sugar, 91, 95%    Physical Exam  Constitutional:       General: She is not in acute distress.  Eyes:      Extraocular Movements: Extraocular movements intact.   Cardiovascular:      Rate and Rhythm: Normal rate and regular rhythm.   Pulmonary:      Effort: Pulmonary effort is normal.      Breath sounds: Normal breath sounds.   Abdominal:      General: Bowel sounds are normal.      Palpations: Abdomen is soft.   Musculoskeletal:      Cervical back: Neck supple.      Right lower leg: No edema.      Left lower leg: No edema.   Neurological:      Mental Status: She is alert.   Psychiatric:         Mood and Affect: Mood normal.         Behavior: Behavior is cooperative.         Assessment/Plan   Problem List Items Addressed This Visit       GERD (gastroesophageal reflux disease)     Monitor GI symptoms  PPI         Hypertension, essential     Monitor blood pressure  BP at goal         Moderate Alzheimer's dementia with anxiety, unspecified timing of dementia onset (CMS/[facility])     Monitor for safety  Assist with care as needed  Follow-up with neurology outpatient  Aricept         Weakness - Primary     Continue to work in therapy, progressing  towards goals          Medications, treatments, and labs reviewed  Continue medications and treatments as listed in EMR    Scribe Attestation  I, Kellen Alcantara   attest that this documentation has been prepared under the direction and in the presence of Christopher Jaime MD    Provider Attestation - Scribe documentation  All medical record entries made by the Scribe were at my direction and personally dictated by me. I have reviewed the chart and agree that the record accurately reflects my personal performance of the history, physical exam, discussion and plan.   Christopher Jaime MD

## 2024-03-01 NOTE — LETTER
Patient: Estella Lanese  : 1950    Encounter Date: 2024    PROGRESS NOTE    Subjective  Chief complaint: Estella M Lanese is a 74 y.o. female who is a long term care patient being seen and evaluated for weakness.    HPI:  HPI  Patient is working in therapy due to generalized weakness.  Patient is working on seated balance activities to increase core strength and improve all mobility.  Patient is ambulating with 2 wheeled walker and CGA.  Demonstrating decreased step height and length with right lower extremity only.  Patient is performing transfers requiring CGA to min assist and verbal cues for safety.  Patient seen and examined at bedside, appears to be in no acute distress.  Denies chest pain or shortness of breath.  Nursing staff voicing no new concerns.  Mentation at baseline.    Objective  Vital signs: 116/54, 97.2, 64, 18, blood sugar, 91, 95%    Physical Exam  Constitutional:       General: She is not in acute distress.  Eyes:      Extraocular Movements: Extraocular movements intact.   Cardiovascular:      Rate and Rhythm: Normal rate and regular rhythm.   Pulmonary:      Effort: Pulmonary effort is normal.      Breath sounds: Normal breath sounds.   Abdominal:      General: Bowel sounds are normal.      Palpations: Abdomen is soft.   Musculoskeletal:      Cervical back: Neck supple.      Right lower leg: No edema.      Left lower leg: No edema.   Neurological:      Mental Status: She is alert.   Psychiatric:         Mood and Affect: Mood normal.         Behavior: Behavior is cooperative.         Assessment/Plan  Problem List Items Addressed This Visit       GERD (gastroesophageal reflux disease)     Monitor GI symptoms  PPI         Hypertension, essential     Monitor blood pressure  BP at goal         Moderate Alzheimer's dementia with anxiety, unspecified timing of dementia onset (CMS/[facility])     Monitor for safety  Assist with care as needed  [facility]          Weakness - Primary     Continue to work in therapy, progressing towards goals          Medications, treatments, and labs reviewed  Continue medications and treatments as listed in EMR    Scribe Attestation  I, Kellen Alcantara   attest that this documentation has been prepared under the direction and in the presence of Christopher Jaime MD    Provider Attestation - Scribe documentation  All medical record entries made by the Scribe were at my direction and personally dictated by me. I have reviewed the chart and agree that the record accurately reflects my personal performance of the history, physical exam, discussion and plan.   Christopher Jaime MD            Electronically Signed By: Christopher Jaime MD   3/2/24  8:35 AM

## 2024-03-19 ENCOUNTER — NURSING HOME VISIT (OUTPATIENT)
Dept: POST ACUTE CARE | Facility: EXTERNAL LOCATION | Age: 74
End: 2024-03-19
Payer: COMMERCIAL

## 2024-03-19 DIAGNOSIS — G30.9 MODERATE ALZHEIMER'S DEMENTIA WITH ANXIETY, UNSPECIFIED TIMING OF DEMENTIA ONSET (MULTI): ICD-10-CM

## 2024-03-19 DIAGNOSIS — F02.B4 MODERATE ALZHEIMER'S DEMENTIA WITH ANXIETY, UNSPECIFIED TIMING OF DEMENTIA ONSET (MULTI): ICD-10-CM

## 2024-03-19 DIAGNOSIS — I10 HYPERTENSION, ESSENTIAL: ICD-10-CM

## 2024-03-19 DIAGNOSIS — G40.201 PARTIAL SYMPTOMATIC EPILEPSY WITH COMPLEX PARTIAL SEIZURES, NOT INTRACTABLE, WITH STATUS EPILEPTICUS (MULTI): ICD-10-CM

## 2024-03-19 DIAGNOSIS — E11.9 TYPE 2 DIABETES MELLITUS WITHOUT COMPLICATION, WITHOUT LONG-TERM CURRENT USE OF INSULIN (MULTI): ICD-10-CM

## 2024-03-19 DIAGNOSIS — K21.9 GASTROESOPHAGEAL REFLUX DISEASE WITHOUT ESOPHAGITIS: ICD-10-CM

## 2024-03-19 PROCEDURE — 99309 SBSQ NF CARE MODERATE MDM 30: CPT | Performed by: INTERNAL MEDICINE

## 2024-03-19 NOTE — LETTER
Patient: Estella Lanese  : 1950    Encounter Date: 2024    PROGRESS NOTE    Subjective  Chief complaint: Estella M Lanese is a 74 y.o. female who is a long term care patient being seen and evaluated for monthly general medical care and follow-up    HPI:  Patient presents for general medical care and f/u.  Patient seen and examined at bedside. Patient has dementia and requires assistance with ADLs. T2DM, Patient denies vision changes, excessive thirst, sweating, urinary frequency.  HTN, denies fatigue, sob, and palpitations. Seizures controlled. No recent seizures controlled. No adverse effects noted tolerating antiepileptic medications. Denies changes in weakness or speech. Patient has diagnosis of GERD, denies heartburn, regurgitation, epigastric discomfort, sour taste, and cough.  No issues per nursing.  Patient has no acute complaints.       Objective  Vital signs: 116/54,64,95%, BS 98    Physical Exam  Constitutional:       General: She is not in acute distress.  Eyes:      Extraocular Movements: Extraocular movements intact.   Cardiovascular:      Rate and Rhythm: Normal rate and regular rhythm.   Pulmonary:      Effort: Pulmonary effort is normal.      Breath sounds: Normal breath sounds.   Abdominal:      General: Bowel sounds are normal.      Palpations: Abdomen is soft.   Musculoskeletal:      Cervical back: Neck supple.      Right lower leg: No edema.      Left lower leg: No edema.   Neurological:      Mental Status: She is alert.   Psychiatric:         Mood and Affect: Mood normal.         Behavior: Behavior is cooperative.         Assessment/Plan  Problem List Items Addressed This Visit       Type 2 diabetes mellitus without complication, without long-term current use of insulin (CMS/[facility])     Not currently on any medications for diabetes  Monitor A1C routinely          GERD (gastroesophageal reflux disease)     Monitor GI symptoms  PPI         Hypertension, essential     Monitor blood  pressure           Partial symptomatic epilepsy with complex partial seizures, not intractable, with status epilepticus (CMS/HCC)     neurology following   Keppra  Ativan as needed  Lacosamide         Moderate Alzheimer's dementia with anxiety, unspecified timing of dementia onset (CMS/[facility])     Monitor for safety  Assist with care as needed  Follow-up with neurology outpatient  Aricept          Medications, treatments, and labs reviewed  Continue medications and treatments as listed in EMR      Scribe Attestation  I, Kellen Blum   attest that this documentation has been prepared under the direction and in the presence of Christopher Jaime MD.     Provider Attestation - Scribe documentation  All medical record entries made by the Scribe were at my direction and personally dictated by me. I have reviewed the chart and agree that the record accurately reflects my personal performance of the history, physical exam, discussion and plan.   Christopher Jaime MD      Electronically Signed By: Christopher Jaime MD   3/19/24  5:38 PM

## 2024-03-19 NOTE — PROGRESS NOTES
PROGRESS NOTE    Subjective   Chief complaint: Estella M Lanese is a 74 y.o. female who is a long term care patient being seen and evaluated for monthly general medical care and follow-up    HPI:  Patient presents for general medical care and f/u.  Patient seen and examined at bedside. Patient has dementia and requires assistance with ADLs. T2DM, Patient denies vision changes, excessive thirst, sweating, urinary frequency.  HTN, denies fatigue, sob, and palpitations. Seizures controlled. No recent seizures controlled. No adverse effects noted tolerating antiepileptic medications. Denies changes in weakness or speech. Patient has diagnosis of GERD, denies heartburn, regurgitation, epigastric discomfort, sour taste, and cough.  No issues per nursing.  Patient has no acute complaints.       Objective   Vital signs: 116/54,64,95%, BS 98    Physical Exam  Constitutional:       General: She is not in acute distress.  Eyes:      Extraocular Movements: Extraocular movements intact.   Cardiovascular:      Rate and Rhythm: Normal rate and regular rhythm.   Pulmonary:      Effort: Pulmonary effort is normal.      Breath sounds: Normal breath sounds.   Abdominal:      General: Bowel sounds are normal.      Palpations: Abdomen is soft.   Musculoskeletal:      Cervical back: Neck supple.      Right lower leg: No edema.      Left lower leg: No edema.   Neurological:      Mental Status: She is alert.   Psychiatric:         Mood and Affect: Mood normal.         Behavior: Behavior is cooperative.         Assessment/Plan   Problem List Items Addressed This Visit       Type 2 diabetes mellitus without complication, without long-term current use of insulin (CMS/[facility])     Not currently on any medications for diabetes  Monitor A1C routinely          GERD (gastroesophageal reflux disease)     Monitor GI symptoms  PPI         Hypertension, essential     Monitor blood pressure           Partial symptomatic epilepsy with complex partial  seizures, not intractable, with status epilepticus (CMS/[facility])     neurology following   Keppra  Ativan as needed  Lacosamide         Moderate Alzheimer's dementia with anxiety, unspecified timing of dementia onset (CMS/formerly [facility])     Monitor for safety  Assist with care as needed  Follow-up with neurology outpatient  Aricept          Medications, treatments, and labs reviewed  Continue medications and treatments as listed in EMR      Scribe Attestation  I, Kellen Blum   attest that this documentation has been prepared under the direction and in the presence of Christopher Jaime MD.     Provider Attestation - Scribe documentation  All medical record entries made by the Scribe were at my direction and personally dictated by me. I have reviewed the chart and agree that the record accurately reflects my personal performance of the history, physical exam, discussion and plan.   Christopher Jaime MD

## 2024-03-22 ENCOUNTER — NURSING HOME VISIT (OUTPATIENT)
Dept: POST ACUTE CARE | Facility: EXTERNAL LOCATION | Age: 74
End: 2024-03-22
Payer: COMMERCIAL

## 2024-03-22 VITALS
BODY MASS INDEX: 28.54 KG/M2 | WEIGHT: 161.1 LBS | HEIGHT: 63 IN | DIASTOLIC BLOOD PRESSURE: 67 MMHG | SYSTOLIC BLOOD PRESSURE: 111 MMHG

## 2024-03-22 DIAGNOSIS — K21.9 GASTROESOPHAGEAL REFLUX DISEASE WITHOUT ESOPHAGITIS: ICD-10-CM

## 2024-03-22 DIAGNOSIS — E11.9 TYPE 2 DIABETES MELLITUS WITHOUT COMPLICATION, WITHOUT LONG-TERM CURRENT USE OF INSULIN (MULTI): ICD-10-CM

## 2024-03-22 DIAGNOSIS — Z00.00 ENCOUNTER FOR ANNUAL WELLNESS VISIT (AWV) IN MEDICARE PATIENT: ICD-10-CM

## 2024-03-22 DIAGNOSIS — E55.9 VITAMIN D DEFICIENCY: ICD-10-CM

## 2024-03-22 DIAGNOSIS — G40.201 PARTIAL SYMPTOMATIC EPILEPSY WITH COMPLEX PARTIAL SEIZURES, NOT INTRACTABLE, WITH STATUS EPILEPTICUS (MULTI): ICD-10-CM

## 2024-03-22 DIAGNOSIS — F02.B4 MODERATE ALZHEIMER'S DEMENTIA WITH ANXIETY, UNSPECIFIED TIMING OF DEMENTIA ONSET (MULTI): ICD-10-CM

## 2024-03-22 DIAGNOSIS — G30.9 MODERATE ALZHEIMER'S DEMENTIA WITH ANXIETY, UNSPECIFIED TIMING OF DEMENTIA ONSET (MULTI): ICD-10-CM

## 2024-03-22 DIAGNOSIS — E78.5 HYPERLIPIDEMIA, UNSPECIFIED HYPERLIPIDEMIA TYPE: ICD-10-CM

## 2024-03-22 DIAGNOSIS — F32.A DEPRESSION, UNSPECIFIED DEPRESSION TYPE: ICD-10-CM

## 2024-03-22 DIAGNOSIS — D50.9 IRON DEFICIENCY ANEMIA, UNSPECIFIED IRON DEFICIENCY ANEMIA TYPE: ICD-10-CM

## 2024-03-22 DIAGNOSIS — I10 HYPERTENSION, ESSENTIAL: ICD-10-CM

## 2024-03-22 PROCEDURE — G0439 PPPS, SUBSEQ VISIT: HCPCS | Performed by: INTERNAL MEDICINE

## 2024-03-22 PROCEDURE — 99309 SBSQ NF CARE MODERATE MDM 30: CPT | Performed by: INTERNAL MEDICINE

## 2024-03-22 NOTE — LETTER
Patient: Estella Lanese  : 1950    Encounter Date: 2024    ANNUAL WELLNESS VISIT    Subjective:  Chief Complaint: Estella M Lanese is an 74 y.o.  Non- female who presents for annual wellness visit, general medical care, and follow-up chronic conditions.    HPI:  HPI Patient seen and examined at bedside for annual wellness visit and physical. Denies constitutional symptoms. Denies pain or discomfort. Patient stable and without complaints at this time according to nurse. No acute distress.     Past Medical History:   Diagnosis Date   • Acute sinusitis, unspecified 06/10/2013    Acute sinusitis   • Age-related osteoporosis without current pathological fracture 06/10/2013    Osteoporosis   • Atrophic disorder of skin, unspecified 06/10/2013    Atrophoderma   • Benign neoplasm of unspecified breast 06/10/2013    Benign neoplasm of female breast   • Encounter for general adult medical examination without abnormal findings 2018    Encounter for annual health examination   • Encounter for general adult medical examination without abnormal findings 2021    Encounter for annual health examination   • Encounter for general adult medical examination without abnormal findings 2021    Encounter for annual health examination   • Encounter for general adult medical examination without abnormal findings     Encounter for annual health examination   • Encounter for screening mammogram for malignant neoplasm of breast     Visit for screening mammogram   • Excessive daytime sleepiness 2023   • Nausea 2014    Nausea   • Other conditions influencing health status     Hemigastrectomy   • Personal history of diseases of the skin and subcutaneous tissue 06/10/2013    History of skin disorder   • Personal history of other diseases of the circulatory system     History of hypertension   • Personal history of other diseases of the respiratory system 2017    History of bronchitis  "  • Personal history of other endocrine, nutritional and metabolic disease     History of high cholesterol   • Personal history of other specified conditions 08/13/2019    History of dizziness   • Suicidal ideation 01/19/2023   • Type 2 diabetes mellitus without complications (CMS/[facility]) 12/07/2022    Diabetes mellitus   • Vertigo 01/19/2023       Past Surgical History:   Procedure Laterality Date   • COLONOSCOPY  03/28/2013    Complete Colonoscopy   • OTHER SURGICAL HISTORY  03/28/2013    Brain Surgery   • OTHER SURGICAL HISTORY  03/28/2013    Excision Of Lesion Genitalia       Family History   Problem Relation Name Age of Onset   • Alzheimer's disease Mother     • Diabetes Mother     • Sleep apnea Mother     • Heart attack Father     • Other (malignant neoplasm of breast) Sister     • Other (Coronary Artery Surgery) Brother     • Other (Multiple family members have migrane headaches) Other         Social History     Socioeconomic History   • Marital status:      Spouse name: None   • Number of children: None   • Years of education: None   • Highest education level: None   Occupational History   • None   Tobacco Use   • Smoking status: Never   • Smokeless tobacco: Never   Vaping Use   • Vaping Use: Never used   Substance and Sexual Activity   • Alcohol use: Not Currently   • Drug use: Never   • Sexual activity: Not Currently   Other Topics Concern   • None   Social History Narrative   • None     Social Determinants of Health     Financial Resource Strain: Not on file   Food Insecurity: Not on file   Transportation Needs: Not on file   Physical Activity: Not on file   Stress: Not on file   Social Connections: Not on file   Intimate Partner Violence: Not on file   Housing Stability: Not on file       Objective  /67   Ht 1.6 m (5' 3\")   Wt 73.1 kg (161 lb 1.6 oz)   BMI 28.54 kg/m²     Physical Exam  Constitutional:       General: She is not in acute distress.  Eyes:      Extraocular Movements: " Extraocular movements intact.   Cardiovascular:      Rate and Rhythm: Normal rate and regular rhythm.   Pulmonary:      Effort: Pulmonary effort is normal.      Breath sounds: Normal breath sounds.   Abdominal:      General: Bowel sounds are normal.      Palpations: Abdomen is soft.   Musculoskeletal:      Cervical back: Neck supple.      Right lower leg: No edema.      Left lower leg: No edema.   Neurological:      Mental Status: She is alert.   Psychiatric:         Mood and Affect: Mood normal.         Behavior: Behavior is cooperative.         Imaging:  No results found.     Labs reviewed:    Lab Results   Component Value Date    WBC 7.8 07/05/2023    HGB 11.9 (L) 07/05/2023    HCT 37.2 07/05/2023     07/05/2023    CHOL 169 12/06/2022    TRIG 115 12/06/2022    HDL 64.5 12/06/2022    ALT 12 06/29/2023    AST 13 06/29/2023     07/05/2023    K 3.9 07/05/2023     07/05/2023    CREATININE 0.62 07/05/2023    BUN 13 07/05/2023    CO2 27 07/05/2023    TSH 1.74 07/11/2023    INR 1.1 06/29/2023    HGBA1C 5.5 01/26/2024       Past Medical, Surgical, and Family History reviewed and updated in chart  Allergies reviewed and updated in facility record  All medications and treatments were reviewed in the local facility record  Nursing assessments/notes were reviewed in the local facility record    List of current healthcare providers:  Patient Care Team:  Christopher Jaime MD as PCP - General     HRA:  Over the past 2 weeks, how often have you been bothered by any of the following problems?  Little interest or pleasure in doing things: Several days  Feeling down, depressed, or hopeless: Not at all  Patient Health Questionnaire-2 Score: 1    Steadi Fall Risk  One or more falls in the last year? Yes  How many Times? 1  Was the patient injured in the fall? No  Has trouble stepping onto curb? Yes  Advised to use a cane or walker to get around safely? Yes  Often has to rush to toilet? No  Feels unsteady when walking?  No  Has lost some feeling in feet? Yes  Often feels sad or depressed? Yes  Steadies self on furniture while walking at home? No  Takes medicine that makes them feel lightheaded or more tired than usual? Yes  Worried about Falling? No  Takes medicine to sleep or improve mood? Yes  Needs to push with hands when rising from a chair? No            Falls Home Safety Risk Factors  Home Safety Risk Factors: None    Functional Ability/Level of Safety  Cognitive Impairment Observed: Cognitive impairment observed  Cognitive Impairment Reported: Cognitive impairment reported by patient or family    Patient Self Assessment of Health Status  Patient Self Assessment: Fair    Nutrition and Exercise  Current Diet: Well Balanced Diet  Adequate Fluid Intake: No  Caffeine: Yes  Exercise Frequency: Infrequently    ADL Screening  Hearing - Right Ear: Functional  Hearing - Left Ear: Functional  Bathing: Dependent  Dressing: Dependent  Walks in Home: Dependent    IADL's  Managing Finances: Total care  Grocery Shopping: Total care  Taking Medication: Total care  Doing Housework: Total care    Psychosocial risks:  Do you feel sad - no  Do you feel stressed - no  Do you feel lonely - no  Do you feel fatigued - no  Do you have any pain - no    Assessment/Plan:  Problem List Items Addressed This Visit    None    The following health maintenance schedule was reviewed with the patient and provided in printed form in the after visit summary:  Health Maintenance   Topic Date Due   • COVID-19 Vaccine (4 - 2023-24 season) 09/01/2023   • Diabetes: Hemoglobin A1C  04/26/2024   • Lipid Panel  03/27/2024 (Originally 12/6/2023)   • Diabetes: Urine Protein Screening  03/27/2024 (Originally 11/15/2020)   • Mammogram  04/22/2024 (Originally 6/11/2023)   • Diabetes: Retinopathy Screening  04/22/2024 (Originally 7/26/2023)   • Bone Density Scan  04/22/2024 (Originally 1950)   • Colorectal Cancer Screening  04/22/2024 (Originally 1950)   • Pneumococcal  Vaccine: 65+ Years (2 - PCV) 03/23/2025 (Originally 11/26/2019)   • Diabetes: Foot Exam  03/22/2025   • Medicare Annual Wellness Visit (AWV)  03/23/2025   • DTaP/Tdap/Td Vaccines (2 - Td or Tdap) 01/31/2031   • Influenza Vaccine  Completed   • HIB Vaccines  Aged Out   • Hepatitis B Vaccines  Aged Out   • IPV Vaccines  Aged Out   • Hepatitis A Vaccines  Aged Out   • Meningococcal Vaccine  Aged Out   • Rotavirus Vaccines  Aged Out   • HPV Vaccines  Aged Out   • Zoster Vaccines  Discontinued   • Hepatitis C Screening  Discontinued   • Irritable Bowel Syndrome  Discontinued     Will obtain the following labs at nursing facility:  BMP, CBC, and A1C     Continue current medications as listed in facility EMR  Time spent 30 minutes (53085)   Scribe Attestation  I, Kellen Vega   attest that this documentation has been prepared under the direction and in the presence of Christopher Jaime MD.    Provider Attestation - Scribe documentation  All medical record entries made by the Scribe were at my direction and personally dictated by me. I have reviewed the chart and agree that the record accurately reflects my personal performance of the history, physical exam, discussion and plan.          Electronically Signed By: Christopher Jaime MD   3/25/24  6:28 PM

## 2024-03-23 PROBLEM — Z00.00 ENCOUNTER FOR ANNUAL WELLNESS VISIT (AWV) IN MEDICARE PATIENT: Status: ACTIVE | Noted: 2024-02-26

## 2024-03-23 ASSESSMENT — PATIENT HEALTH QUESTIONNAIRE - PHQ9
SUM OF ALL RESPONSES TO PHQ9 QUESTIONS 1 AND 2: 1
10. IF YOU CHECKED OFF ANY PROBLEMS, HOW DIFFICULT HAVE THESE PROBLEMS MADE IT FOR YOU TO DO YOUR WORK, TAKE CARE OF THINGS AT HOME, OR GET ALONG WITH OTHER PEOPLE: NOT DIFFICULT AT ALL
1. LITTLE INTEREST OR PLEASURE IN DOING THINGS: SEVERAL DAYS
2. FEELING DOWN, DEPRESSED OR HOPELESS: NOT AT ALL

## 2024-03-23 ASSESSMENT — ACTIVITIES OF DAILY LIVING (ADL)
TAKING_MEDICATION: TOTAL CARE
DRESSING: DEPENDENT
MANAGING_FINANCES: TOTAL CARE
DOING_HOUSEWORK: TOTAL CARE
BATHING: DEPENDENT
GROCERY_SHOPPING: TOTAL CARE

## 2024-03-23 ASSESSMENT — ENCOUNTER SYMPTOMS
DEPRESSION: 1
OCCASIONAL FEELINGS OF UNSTEADINESS: 0
LOSS OF SENSATION IN FEET: 1

## 2024-03-23 NOTE — PROGRESS NOTES
ANNUAL WELLNESS VISIT    Subjective :  Chief Complaint: Estella M Lanese is an 74 y.o.  Non- female who presents for annual wellness visit, general medical care, and follow-up chronic conditions.    HPI:  HPI Patient seen and examined at bedside for annual wellness visit and physical. Denies constitutional symptoms. Denies pain or discomfort. Patient stable and without complaints at this time according to nurse. No acute distress.     Past Medical History:   Diagnosis Date    Acute sinusitis, unspecified 06/10/2013    Acute sinusitis    Age-related osteoporosis without current pathological fracture 06/10/2013    Osteoporosis    Atrophic disorder of skin, unspecified 06/10/2013    Atrophoderma    Benign neoplasm of unspecified breast 06/10/2013    Benign neoplasm of female breast    Encounter for general adult medical examination without abnormal findings 11/12/2018    Encounter for annual health examination    Encounter for general adult medical examination without abnormal findings 11/12/2021    Encounter for annual health examination    Encounter for general adult medical examination without abnormal findings 06/21/2021    Encounter for annual health examination    Encounter for general adult medical examination without abnormal findings     Encounter for annual health examination    Encounter for screening mammogram for malignant neoplasm of breast     Visit for screening mammogram    Excessive daytime sleepiness 01/19/2023    Nausea 02/19/2014    Nausea    Other conditions influencing health status     Hemigastrectomy    Personal history of diseases of the skin and subcutaneous tissue 06/10/2013    History of skin disorder    Personal history of other diseases of the circulatory system     History of hypertension    Personal history of other diseases of the respiratory system 12/22/2017    History of bronchitis    Personal history of other endocrine, nutritional and metabolic disease     History  Writer of high cholesterol    Personal history of other specified conditions 08/13/2019    History of dizziness    Suicidal ideation 01/19/2023    Type 2 diabetes mellitus without complications (CMS/[facility]) 12/07/2022    Diabetes mellitus    Vertigo 01/19/2023       Past Surgical History:   Procedure Laterality Date    COLONOSCOPY  03/28/2013    Complete Colonoscopy    OTHER SURGICAL HISTORY  03/28/2013    Brain Surgery    OTHER SURGICAL HISTORY  03/28/2013    Excision Of Lesion Genitalia       Family History   Problem Relation Name Age of Onset    Alzheimer's disease Mother      Diabetes Mother      Sleep apnea Mother      Heart attack Father      Other (malignant neoplasm of breast) Sister      Other (Coronary Artery Surgery) Brother      Other (Multiple family members have migrane headaches) Other         Social History     Socioeconomic History    Marital status:      Spouse name: None    Number of children: None    Years of education: None    Highest education level: None   Occupational History    None   Tobacco Use    Smoking status: Never    Smokeless tobacco: Never   Vaping Use    Vaping Use: Never used   Substance and Sexual Activity    Alcohol use: Not Currently    Drug use: Never    Sexual activity: Not Currently   Other Topics Concern    None   Social History Narrative    None     Social Determinants of Health     Financial Resource Strain: Not on file   Food Insecurity: Not on file   Transportation Needs: Not on file   Physical Activity: Not on file   Stress: Not on file   Social Connections: Not on file   Intimate Partner Violence: Not on file   Housing Stability: Not on file       Objective   /67   Ht 1.6 m (5' 3\")   Wt 73.1 kg (161 lb 1.6 oz)   BMI 28.54 kg/m²     Physical Exam  Constitutional:       General: She is not in acute distress.  Eyes:      Extraocular Movements: Extraocular movements intact.   Cardiovascular:      Rate and Rhythm: Normal rate and regular rhythm.   Pulmonary:      " Effort: Pulmonary effort is normal.      Breath sounds: Normal breath sounds.   Abdominal:      General: Bowel sounds are normal.      Palpations: Abdomen is soft.   Musculoskeletal:      Cervical back: Neck supple.      Right lower leg: No edema.      Left lower leg: No edema.   Neurological:      Mental Status: She is alert.   Psychiatric:         Mood and Affect: Mood normal.         Behavior: Behavior is cooperative.         Imaging:  No results found.     Labs reviewed:    Lab Results   Component Value Date    WBC 7.8 07/05/2023    HGB 11.9 (L) 07/05/2023    HCT 37.2 07/05/2023     07/05/2023    CHOL 169 12/06/2022    TRIG 115 12/06/2022    HDL 64.5 12/06/2022    ALT 12 06/29/2023    AST 13 06/29/2023     07/05/2023    K 3.9 07/05/2023     07/05/2023    CREATININE 0.62 07/05/2023    BUN 13 07/05/2023    CO2 27 07/05/2023    TSH 1.74 07/11/2023    INR 1.1 06/29/2023    HGBA1C 5.5 01/26/2024       Past Medical, Surgical, and Family History reviewed and updated in chart  Allergies reviewed and updated in facility record  All medications and treatments were reviewed in the local facility record  Nursing assessments/notes were reviewed in the local facility record    List of current healthcare providers:  Patient Care Team:  Christopher Jaime MD as PCP - General     HRA:  Over the past 2 weeks, how often have you been bothered by any of the following problems?  Little interest or pleasure in doing things: Several days  Feeling down, depressed, or hopeless: Not at all  Patient Health Questionnaire-2 Score: 1    Steadi Fall Risk  One or more falls in the last year? Yes  How many Times? 1  Was the patient injured in the fall? No  Has trouble stepping onto curb? Yes  Advised to use a cane or walker to get around safely? Yes  Often has to rush to toilet? No  Feels unsteady when walking? No  Has lost some feeling in feet? Yes  Often feels sad or depressed? Yes  Steadies self on furniture while walking at  home? No  Takes medicine that makes them feel lightheaded or more tired than usual? Yes  Worried about Falling? No  Takes medicine to sleep or improve mood? Yes  Needs to push with hands when rising from a chair? No            Falls Home Safety Risk Factors  Home Safety Risk Factors: None    Functional Ability/Level of Safety  Cognitive Impairment Observed: Cognitive impairment observed  Cognitive Impairment Reported: Cognitive impairment reported by patient or family    Patient Self Assessment of Health Status  Patient Self Assessment: Fair    Nutrition and Exercise  Current Diet: Well Balanced Diet  Adequate Fluid Intake: No  Caffeine: Yes  Exercise Frequency: Infrequently    ADL Screening  Hearing - Right Ear: Functional  Hearing - Left Ear: Functional  Bathing: Dependent  Dressing: Dependent  Walks in Home: Dependent    IADL's  Managing Finances: Total care  Grocery Shopping: Total care  Taking Medication: Total care  Doing Housework: Total care    Psychosocial risks:  Do you feel sad - no  Do you feel stressed - no  Do you feel lonely - no  Do you feel fatigued - no  Do you have any pain - no    Assessment/Plan :  Problem List Items Addressed This Visit    None    The following health maintenance schedule was reviewed with the patient and provided in printed form in the after visit summary:  Health Maintenance   Topic Date Due    COVID-19 Vaccine (4 - 2023-24 season) 09/01/2023    Diabetes: Hemoglobin A1C  04/26/2024    Lipid Panel  03/27/2024 (Originally 12/6/2023)    Diabetes: Urine Protein Screening  03/27/2024 (Originally 11/15/2020)    Mammogram  04/22/2024 (Originally 6/11/2023)    Diabetes: Retinopathy Screening  04/22/2024 (Originally 7/26/2023)    Bone Density Scan  04/22/2024 (Originally 1950)    Colorectal Cancer Screening  04/22/2024 (Originally 1950)    Pneumococcal Vaccine: 65+ Years (2 - PCV) 03/23/2025 (Originally 11/26/2019)    Diabetes: Foot Exam  03/22/2025    Medicare Annual Wellness  Visit (AWV)  03/23/2025    DTaP/Tdap/Td Vaccines (2 - Td or Tdap) 01/31/2031    Influenza Vaccine  Completed    HIB Vaccines  Aged Out    Hepatitis B Vaccines  Aged Out    IPV Vaccines  Aged Out    Hepatitis A Vaccines  Aged Out    Meningococcal Vaccine  Aged Out    Rotavirus Vaccines  Aged Out    HPV Vaccines  Aged Out    Zoster Vaccines  Discontinued    Hepatitis C Screening  Discontinued    Irritable Bowel Syndrome  Discontinued     Will obtain the following labs at nursing facility:  BMP, CBC, and A1C     Continue current medications as listed in facility EMR  Time spent 30 minutes (11204)   Scribe Attestation  I, Kellen Vega   attest that this documentation has been prepared under the direction and in the presence of Christopher Jaime MD.    Provider Attestation - Scribe documentation  All medical record entries made by the Scribe were at my direction and personally dictated by me. I have reviewed the chart and agree that the record accurately reflects my personal performance of the history, physical exam, discussion and plan.

## 2024-03-25 PROBLEM — Z00.00 ENCOUNTER FOR SUBSEQUENT ANNUAL WELLNESS VISIT IN MEDICARE PATIENT: Status: ACTIVE | Noted: 2024-03-25

## 2024-04-05 ENCOUNTER — NURSING HOME VISIT (OUTPATIENT)
Dept: POST ACUTE CARE | Facility: EXTERNAL LOCATION | Age: 74
End: 2024-04-05
Payer: COMMERCIAL

## 2024-04-05 DIAGNOSIS — K21.9 GASTROESOPHAGEAL REFLUX DISEASE WITHOUT ESOPHAGITIS: ICD-10-CM

## 2024-04-05 DIAGNOSIS — G30.9 MODERATE ALZHEIMER'S DEMENTIA WITH ANXIETY, UNSPECIFIED TIMING OF DEMENTIA ONSET (MULTI): Primary | ICD-10-CM

## 2024-04-05 DIAGNOSIS — I10 HYPERTENSION, ESSENTIAL: ICD-10-CM

## 2024-04-05 DIAGNOSIS — F02.B4 MODERATE ALZHEIMER'S DEMENTIA WITH ANXIETY, UNSPECIFIED TIMING OF DEMENTIA ONSET (MULTI): Primary | ICD-10-CM

## 2024-04-05 PROCEDURE — 99308 SBSQ NF CARE LOW MDM 20: CPT | Performed by: INTERNAL MEDICINE

## 2024-04-05 NOTE — LETTER
Patient: Estella Lanese  : 1950    Encounter Date: 2024    PROGRESS NOTE    Subjective  Chief complaint: Estella M Lanese is a 74 y.o. female who is a long term care patient being seen and evaluated for follow-up.    HPI:  HPI  Reported that patient's son called and wanted to discharge patient to take her home.  Was advised to keep patient at facility due to patient's dementia and needs for care.  Patient was seen and examined at bedside, appears to be in no acute distress.    Objective  Vital signs: 120/60, 98.0, 62, 20, 98%, blood sugar, 94    Physical Exam  Constitutional:       General: She is not in acute distress.  Eyes:      Extraocular Movements: Extraocular movements intact.   Cardiovascular:      Rate and Rhythm: Normal rate and regular rhythm.   Pulmonary:      Effort: Pulmonary effort is normal.      Breath sounds: Normal breath sounds.   Abdominal:      General: Bowel sounds are normal.      Palpations: Abdomen is soft.   Musculoskeletal:      Cervical back: Neck supple.      Right lower leg: No edema.      Left lower leg: No edema.   Neurological:      Mental Status: She is alert.   Psychiatric:         Mood and Affect: Mood normal.         Behavior: Behavior is cooperative.         Assessment/Plan  Problem List Items Addressed This Visit       GERD (gastroesophageal reflux disease)     Monitor GI symptoms  PPI         Hypertension, essential     Monitor blood pressure  BP at goal         Moderate Alzheimer's dementia with anxiety, unspecified timing of dementia onset (CMS/[facility]) - Primary     Monitor for safety  Assist with care as needed  Follow-up with neurology outpatient  Aricept          Medications, treatments, and labs reviewed  Continue medications and treatments as listed in EMR    Scribe Attestation  I, Kellen Alcantara   attest that this documentation has been prepared under the direction and in the presence of Christopher Jaime MD    Provider Attestation - Kellen  documentation  All medical record entries made by the Scribe were at my direction and personally dictated by me. I have reviewed the chart and agree that the record accurately reflects my personal performance of the history, physical exam, discussion and plan.   Christopher Jaime MD            Electronically Signed By: Christopher Jaime MD   4/5/24  3:06 PM

## 2024-04-05 NOTE — PROGRESS NOTES
PROGRESS NOTE    Subjective   Chief complaint: Estella M Lanese is a 74 y.o. female who is a long term care patient being seen and evaluated for follow-up.    HPI:  HPI  Reported that patient's son called and wanted to discharge patient to take her home.  Was advised to keep patient at facility due to patient's dementia and needs for care.  Patient was seen and examined at bedside, appears to be in no acute distress.    Objective   Vital signs: 120/60, 98.0, 62, 20, 98%, blood sugar, 94    Physical Exam  Constitutional:       General: She is not in acute distress.  Eyes:      Extraocular Movements: Extraocular movements intact.   Cardiovascular:      Rate and Rhythm: Normal rate and regular rhythm.   Pulmonary:      Effort: Pulmonary effort is normal.      Breath sounds: Normal breath sounds.   Abdominal:      General: Bowel sounds are normal.      Palpations: Abdomen is soft.   Musculoskeletal:      Cervical back: Neck supple.      Right lower leg: No edema.      Left lower leg: No edema.   Neurological:      Mental Status: She is alert.   Psychiatric:         Mood and Affect: Mood normal.         Behavior: Behavior is cooperative.         Assessment/Plan   Problem List Items Addressed This Visit       GERD (gastroesophageal reflux disease)     Monitor GI symptoms  PPI         Hypertension, essential     Monitor blood pressure  BP at goal         Moderate Alzheimer's dementia with anxiety, unspecified timing of dementia onset (CMS/[facility]) - Primary     Monitor for safety  Assist with care as needed  Follow-up with neurology outpatient  Aricept          Medications, treatments, and labs reviewed  Continue medications and treatments as listed in EMR    Scribe Attestation  I, Kellen Alcantara   attest that this documentation has been prepared under the direction and in the presence of Christopher Jaime MD    Provider Attestation - Scribe documentation  All medical record entries made by the Scribe were at my  direction and personally dictated by me. I have reviewed the chart and agree that the record accurately reflects my personal performance of the history, physical exam, discussion and plan.   Christopher Jaime MD

## 2024-04-16 ENCOUNTER — NURSING HOME VISIT (OUTPATIENT)
Dept: POST ACUTE CARE | Facility: EXTERNAL LOCATION | Age: 74
End: 2024-04-16
Payer: COMMERCIAL

## 2024-04-16 DIAGNOSIS — I10 HYPERTENSION, ESSENTIAL: ICD-10-CM

## 2024-04-16 DIAGNOSIS — F02.B4 MODERATE ALZHEIMER'S DEMENTIA WITH ANXIETY, UNSPECIFIED TIMING OF DEMENTIA ONSET (MULTI): ICD-10-CM

## 2024-04-16 DIAGNOSIS — E11.9 TYPE 2 DIABETES MELLITUS WITHOUT COMPLICATION, WITHOUT LONG-TERM CURRENT USE OF INSULIN (MULTI): ICD-10-CM

## 2024-04-16 DIAGNOSIS — F32.A DEPRESSION, UNSPECIFIED DEPRESSION TYPE: ICD-10-CM

## 2024-04-16 DIAGNOSIS — G30.9 MODERATE ALZHEIMER'S DEMENTIA WITH ANXIETY, UNSPECIFIED TIMING OF DEMENTIA ONSET (MULTI): ICD-10-CM

## 2024-04-16 DIAGNOSIS — G40.201 PARTIAL SYMPTOMATIC EPILEPSY WITH COMPLEX PARTIAL SEIZURES, NOT INTRACTABLE, WITH STATUS EPILEPTICUS (MULTI): ICD-10-CM

## 2024-04-16 DIAGNOSIS — K21.9 GASTROESOPHAGEAL REFLUX DISEASE WITHOUT ESOPHAGITIS: ICD-10-CM

## 2024-04-16 PROCEDURE — 99309 SBSQ NF CARE MODERATE MDM 30: CPT | Performed by: INTERNAL MEDICINE

## 2024-04-16 NOTE — LETTER
Patient: Estella Lanese  : 1950    Encounter Date: 2024    PROGRESS NOTE    Subjective  Chief complaint: Estella M Lanese is a 74 y.o. female who is a long term care patient being seen and evaluated for monthly general medical care and follow-up    HPI:  Patient presents for general medical care and f/u.  Patient seen and examined at bedside. Patient has dementia and requires assistance with ADLs. T2DM, Patient denies vision changes, excessive thirst, sweating, urinary frequency.  HTN, denies fatigue, sob, and palpitations. Seizures controlled. No recent seizures controlled. No adverse effects noted tolerating antiepileptic medications. Denies changes in weakness or speech. Patient has diagnosis of GERD, denies heartburn, regurgitation, epigastric discomfort, sour taste, and cough.  No issues per nursing.  Patient has no acute complaints.       Objective  Vital signs:      Physical Exam  Constitutional:       General: She is not in acute distress.  Eyes:      Extraocular Movements: Extraocular movements intact.   Cardiovascular:      Rate and Rhythm: Normal rate and regular rhythm.   Pulmonary:      Effort: Pulmonary effort is normal.      Breath sounds: Normal breath sounds.   Abdominal:      General: Bowel sounds are normal.      Palpations: Abdomen is soft.   Musculoskeletal:      Cervical back: Neck supple.      Right lower leg: No edema.      Left lower leg: No edema.   Neurological:      Mental Status: She is alert.   Psychiatric:         Mood and Affect: Mood normal.         Behavior: Behavior is cooperative.         Assessment/Plan  Problem List Items Addressed This Visit       Depression     Continue current antidepressant meds  Monitor mood         Type 2 diabetes mellitus without complication, without long-term current use of insulin (Multi)     Not currently on any medications for diabetes  Monitor A1C routinely          GERD (gastroesophageal reflux disease)     Monitor GI  symptoms  PPI         Hypertension, essential     Monitor blood pressure           Partial symptomatic epilepsy with complex partial seizures, not intractable, with status epilepticus (Multi)     neurology following   Keppra  Ativan as needed  Lacosamide         Moderate Alzheimer's dementia with anxiety, unspecified timing of dementia onset (Multi)     Monitor for safety  Assist with care as needed  Follow-up with neurology outpatient  Aricept          Medications, treatments, and labs reviewed  Continue medications and treatments as listed in EMR      Scribe Attestation  IJessica Scribe   attest that this documentation has been prepared under the direction and in the presence of Christopher Jaime MD.     Provider Attestation - Scribe documentation  All medical record entries made by the Scribe were at my direction and personally dictated by me. I have reviewed the chart and agree that the record accurately reflects my personal performance of the history, physical exam, discussion and plan.   Christopher Jaime MD      Electronically Signed By: Christopher aJime MD   4/16/24  4:37 PM

## 2024-04-16 NOTE — PROGRESS NOTES
PROGRESS NOTE    Subjective   Chief complaint: Estella M Lanese is a 74 y.o. female who is a long term care patient being seen and evaluated for monthly general medical care and follow-up    HPI:  Patient presents for general medical care and f/u.  Patient seen and examined at bedside. Patient has dementia and requires assistance with ADLs. T2DM, Patient denies vision changes, excessive thirst, sweating, urinary frequency.  HTN, denies fatigue, sob, and palpitations. Seizures controlled. No recent seizures controlled. No adverse effects noted tolerating antiepileptic medications. Denies changes in weakness or speech. Patient has diagnosis of GERD, denies heartburn, regurgitation, epigastric discomfort, sour taste, and cough.  No issues per nursing.  Patient has no acute complaints.       Objective   Vital signs:      Physical Exam  Constitutional:       General: She is not in acute distress.  Eyes:      Extraocular Movements: Extraocular movements intact.   Cardiovascular:      Rate and Rhythm: Normal rate and regular rhythm.   Pulmonary:      Effort: Pulmonary effort is normal.      Breath sounds: Normal breath sounds.   Abdominal:      General: Bowel sounds are normal.      Palpations: Abdomen is soft.   Musculoskeletal:      Cervical back: Neck supple.      Right lower leg: No edema.      Left lower leg: No edema.   Neurological:      Mental Status: She is alert.   Psychiatric:         Mood and Affect: Mood normal.         Behavior: Behavior is cooperative.         Assessment/Plan   Problem List Items Addressed This Visit       Depression     Continue current antidepressant meds  Monitor mood         Type 2 diabetes mellitus without complication, without long-term current use of insulin (Multi)     Not currently on any medications for diabetes  Monitor A1C routinely          GERD (gastroesophageal reflux disease)     Monitor GI symptoms  PPI         Hypertension, essential     Monitor blood pressure            Partial symptomatic epilepsy with complex partial seizures, not intractable, with status epilepticus (Multi)     neurology following   Keppra  Ativan as needed  Lacosamide         Moderate Alzheimer's dementia with anxiety, unspecified timing of dementia onset (Multi)     Monitor for safety  Assist with care as needed  Follow-up with neurology outpatient  Aricept          Medications, treatments, and labs reviewed  Continue medications and treatments as listed in EMR      Scribe Attestation  I, Kellen Blum   attest that this documentation has been prepared under the direction and in the presence of Christopher Jaime MD.     Provider Attestation - Scribe documentation  All medical record entries made by the Scribe were at my direction and personally dictated by me. I have reviewed the chart and agree that the record accurately reflects my personal performance of the history, physical exam, discussion and plan.   Christopher Jaime MD

## 2024-05-07 ENCOUNTER — NURSING HOME VISIT (OUTPATIENT)
Dept: POST ACUTE CARE | Facility: EXTERNAL LOCATION | Age: 74
End: 2024-05-07
Payer: COMMERCIAL

## 2024-05-07 DIAGNOSIS — R53.1 WEAKNESS: ICD-10-CM

## 2024-05-07 DIAGNOSIS — E11.9 TYPE 2 DIABETES MELLITUS WITHOUT COMPLICATION, WITHOUT LONG-TERM CURRENT USE OF INSULIN (MULTI): ICD-10-CM

## 2024-05-07 DIAGNOSIS — F02.B4 MODERATE ALZHEIMER'S DEMENTIA WITH ANXIETY, UNSPECIFIED TIMING OF DEMENTIA ONSET (MULTI): ICD-10-CM

## 2024-05-07 DIAGNOSIS — G40.201 PARTIAL SYMPTOMATIC EPILEPSY WITH COMPLEX PARTIAL SEIZURES, NOT INTRACTABLE, WITH STATUS EPILEPTICUS (MULTI): ICD-10-CM

## 2024-05-07 DIAGNOSIS — F32.A DEPRESSION, UNSPECIFIED DEPRESSION TYPE: ICD-10-CM

## 2024-05-07 DIAGNOSIS — I10 HYPERTENSION, ESSENTIAL: ICD-10-CM

## 2024-05-07 DIAGNOSIS — G30.9 MODERATE ALZHEIMER'S DEMENTIA WITH ANXIETY, UNSPECIFIED TIMING OF DEMENTIA ONSET (MULTI): ICD-10-CM

## 2024-05-07 PROCEDURE — 99315 NF DSCHRG MGMT 30 MIN/LESS: CPT | Performed by: INTERNAL MEDICINE

## 2024-05-07 NOTE — PROGRESS NOTES
PROGRESS NOTE    Subjective   Chief complaint: Estella M Lanese is a 74 y.o. female who is a long term care patient being seen and evaluated for weakness.     HPI:  Patient presents for f/u therapy and general medical care.  Patient seen and examined at beside.  Therapy has been working with the patient to improve strength, endurance, ADLs, and transfers d/t generalized weakness. She is ambulating with WW at [facility]. ADLs completed at supervision level. Patient has no acute concerns today.      Objective   Vital signs: 164/83,69,96%, BS 93    Physical Exam  Constitutional:       General: She is not in acute distress.  Eyes:      Extraocular Movements: Extraocular movements intact.   Cardiovascular:      Rate and Rhythm: Normal rate and regular rhythm.   Pulmonary:      Effort: Pulmonary effort is normal.      Breath sounds: Normal breath sounds.   Abdominal:      General: Bowel sounds are normal.      Palpations: Abdomen is soft.   Musculoskeletal:      Cervical back: Neck supple.      Right lower leg: No edema.      Left lower leg: No edema.   Neurological:      Mental Status: She is alert.   Psychiatric:         Mood and Affect: Mood normal.         Behavior: Behavior is cooperative.         Assessment/Plan   Problem List Items Addressed This Visit       Depression     Continue current antidepressant meds  Monitor mood         Type 2 diabetes mellitus without complication, without long-term current use of insulin (Multi)     Not currently on any medications for diabetes  Glucoscans BID          Hypertension, essential     Monitor blood pressure           Partial symptomatic epilepsy with complex partial seizures, not intractable, with status epilepticus (Multi)     neurology following   Keppra  Ativan as needed  Lacosamide         Moderate Alzheimer's dementia with anxiety, unspecified timing of dementia onset (Multi)     Monitor for safety  Assist with care as needed  Follow-up with neurology outpatient  [facility]          Weakness     Continue to work in therapy, progressing towards goals          Medications, treatments, and labs reviewed  Continue medications and treatments as listed in EMR      Scribe Attestation  I, Kellen Blum   attest that this documentation has been prepared under the direction and in the presence of Christopher Jaime MD.     Provider Attestation - Scribe documentation  All medical record entries made by the Scribe were at my direction and personally dictated by me. I have reviewed the chart and agree that the record accurately reflects my personal performance of the history, physical exam, discussion and plan.   Christopher Jaime MD

## 2024-05-07 NOTE — LETTER
Patient: Estella Lanese  : 1950    Encounter Date: 2024    PROGRESS NOTE    Subjective  Chief complaint: Estella M Lanese is a 74 y.o. female who is a long term care patient being seen and evaluated for weakness.     HPI:  Patient presents for f/u therapy and general medical care.  Patient seen and examined at [facility].  Therapy has been working with the patient to improve strength, endurance, ADLs, and transfers d/t generalized weakness. She is ambulating with WW at [facility]. ADLs completed at supervision level. Patient has no acute concerns today.      Objective  Vital signs: 164/83,69,96%, BS 93    Physical Exam  Constitutional:       General: She is not in acute distress.  Eyes:      Extraocular Movements: Extraocular movements intact.   Cardiovascular:      Rate and Rhythm: Normal rate and regular rhythm.   Pulmonary:      Effort: Pulmonary effort is normal.      Breath sounds: Normal breath sounds.   Abdominal:      General: Bowel sounds are normal.      Palpations: Abdomen is soft.   Musculoskeletal:      Cervical back: Neck supple.      Right lower leg: No edema.      Left lower leg: No edema.   Neurological:      Mental Status: She is alert.   Psychiatric:         Mood and Affect: Mood normal.         Behavior: Behavior is cooperative.         Assessment/Plan  Problem List Items Addressed This Visit       Depression     Continue current antidepressant meds  Monitor mood         Type 2 diabetes mellitus without complication, without long-term current use of insulin (Multi)     Not currently on any medications for diabetes  Glucoscans BID          Hypertension, essential     Monitor blood pressure           Partial symptomatic epilepsy with complex partial seizures, not intractable, with status epilepticus (Multi)     neurology following   Keppra  Ativan as needed  Lacosamide         Moderate Alzheimer's dementia with anxiety, unspecified timing of dementia onset (Multi)     Monitor for safety  Assist  with care as needed  Follow-up with neurology outpatient  Aricept         Weakness     Continue to work in therapy, progressing towards goals          Medications, treatments, and labs reviewed  Continue medications and treatments as listed in EMR      Scribe Attestation  DANIELLA, Kellen Blum   attest that this documentation has been prepared under the direction and in the presence of Christopher Jaime MD.     Provider Attestation - Scribe documentation  All medical record entries made by the Scribe were at my direction and personally dictated by me. I have reviewed the chart and agree that the record accurately reflects my personal performance of the history, physical exam, discussion and plan.   Christopher Jaime MD      Electronically Signed By: Christopher Jaime MD   5/7/24  3:04 PM

## 2024-05-21 ENCOUNTER — TELEPHONE (OUTPATIENT)
Dept: NEUROLOGY | Facility: CLINIC | Age: 74
End: 2024-05-21

## 2024-05-21 ENCOUNTER — NURSING HOME VISIT (OUTPATIENT)
Dept: POST ACUTE CARE | Facility: EXTERNAL LOCATION | Age: 74
End: 2024-05-21
Payer: COMMERCIAL

## 2024-05-21 DIAGNOSIS — G30.9 MODERATE ALZHEIMER'S DEMENTIA WITH ANXIETY, UNSPECIFIED TIMING OF DEMENTIA ONSET (MULTI): ICD-10-CM

## 2024-05-21 DIAGNOSIS — F32.A DEPRESSION, UNSPECIFIED DEPRESSION TYPE: ICD-10-CM

## 2024-05-21 DIAGNOSIS — E11.9 TYPE 2 DIABETES MELLITUS WITHOUT COMPLICATION, WITHOUT LONG-TERM CURRENT USE OF INSULIN (MULTI): ICD-10-CM

## 2024-05-21 DIAGNOSIS — F02.B4 MODERATE ALZHEIMER'S DEMENTIA WITH ANXIETY, UNSPECIFIED TIMING OF DEMENTIA ONSET (MULTI): ICD-10-CM

## 2024-05-21 DIAGNOSIS — G40.201 PARTIAL SYMPTOMATIC EPILEPSY WITH COMPLEX PARTIAL SEIZURES, NOT INTRACTABLE, WITH STATUS EPILEPTICUS (MULTI): ICD-10-CM

## 2024-05-21 DIAGNOSIS — K21.9 GASTROESOPHAGEAL REFLUX DISEASE WITHOUT ESOPHAGITIS: ICD-10-CM

## 2024-05-21 DIAGNOSIS — I10 HYPERTENSION, ESSENTIAL: ICD-10-CM

## 2024-05-21 PROCEDURE — 99309 SBSQ NF CARE MODERATE MDM 30: CPT | Performed by: INTERNAL MEDICINE

## 2024-05-21 NOTE — PROGRESS NOTES
PROGRESS NOTE    Subjective   Chief complaint: Estella M Lanese is a 74 y.o. female who is a long term care patient being seen and evaluated for monthly general medical care and follow-up    HPI:  Patient presents for general medical care and f/u.  Patient seen and examined at bedside. Patient has dementia and requires assistance with ADLs. T2DM, Patient denies vision changes, excessive thirst, sweating, urinary frequency.  HTN, denies fatigue, sob, and palpitations. Seizures controlled. No recent seizures controlled. No adverse effects noted tolerating antiepileptic medications. Denies changes in weakness or speech. Patient has diagnosis of GERD, denies heartburn, regurgitation, epigastric discomfort, sour taste, and cough.  No issues per nursing.  Patient has no acute complaints.       Objective   Vital signs: 164/83,69,96%, BS 87    Physical Exam  Constitutional:       General: She is not in acute distress.  Eyes:      Extraocular Movements: Extraocular movements intact.   Cardiovascular:      Rate and Rhythm: Normal rate and regular rhythm.   Pulmonary:      Effort: Pulmonary effort is normal.      Breath sounds: Normal breath sounds.   Abdominal:      General: Bowel sounds are normal.      Palpations: Abdomen is soft.   Musculoskeletal:      Cervical back: Neck supple.      Right lower leg: No edema.      Left lower leg: No edema.   Neurological:      Mental Status: She is alert.   Psychiatric:         Mood and Affect: Mood normal.         Behavior: Behavior is cooperative.         Assessment/Plan   Problem List Items Addressed This Visit       Depression     Continue current antidepressant meds  Monitor mood         Type 2 diabetes mellitus without complication, without long-term current use of insulin (Multi)     Not currently on any medications for diabetes  Glucoscans BID          GERD (gastroesophageal reflux disease)     Monitor GI symptoms  PPI         Hypertension, essential     Monitor blood  pressure           Partial symptomatic epilepsy with complex partial seizures, not intractable, with status epilepticus (Multi)     neurology following   Keppra  Ativan as needed  Lacosamide         Moderate Alzheimer's dementia with anxiety, unspecified timing of dementia onset (Multi)     Monitor for safety  Assist with care as needed  Follow-up with neurology outpatient  Aricept          Medications, treatments, and labs reviewed  Continue medications and treatments as listed in EMR      Scribe Attestation  I, Kellen Blum   attest that this documentation has been prepared under the direction and in the presence of Christopher Jaime MD.     Provider Attestation - Scribe documentation  All medical record entries made by the Scribe were at my direction and personally dictated by me. I have reviewed the chart and agree that the record accurately reflects my personal performance of the history, physical exam, discussion and plan.   Christopher Jaime MD

## 2024-05-21 NOTE — LETTER
Patient: Estella Lanese  : 1950    Encounter Date: 2024    PROGRESS NOTE    Subjective  Chief complaint: Estella M Lanese is a 74 y.o. female who is a long term care patient being seen and evaluated for monthly general medical care and follow-up    HPI:  Patient presents for general medical care and f/u.  Patient seen and examined at bedside. Patient has dementia and requires assistance with ADLs. T2DM, Patient denies vision changes, excessive thirst, sweating, urinary frequency.  HTN, denies fatigue, sob, and palpitations. Seizures controlled. No recent seizures controlled. No adverse effects noted tolerating antiepileptic medications. Denies changes in weakness or speech. Patient has diagnosis of GERD, denies heartburn, regurgitation, epigastric discomfort, sour taste, and cough.  No issues per nursing.  Patient has no acute complaints.       Objective  Vital signs: 164/83,69,96%, BS 87    Physical Exam  Constitutional:       General: She is not in acute distress.  Eyes:      Extraocular Movements: Extraocular movements intact.   Cardiovascular:      Rate and Rhythm: Normal rate and regular rhythm.   Pulmonary:      Effort: Pulmonary effort is normal.      Breath sounds: Normal breath sounds.   Abdominal:      General: Bowel sounds are normal.      Palpations: Abdomen is soft.   Musculoskeletal:      Cervical back: Neck supple.      Right lower leg: No edema.      Left lower leg: No edema.   Neurological:      Mental Status: She is alert.   Psychiatric:         Mood and Affect: Mood normal.         Behavior: Behavior is cooperative.         Assessment/Plan  Problem List Items Addressed This Visit       Depression     Continue current antidepressant meds  Monitor mood         Type 2 diabetes mellitus without complication, without long-term current use of insulin (Multi)     Not currently on any medications for diabetes  Glucoscans BID          GERD (gastroesophageal reflux disease)     Monitor GI  symptoms  PPI         Hypertension, essential     Monitor blood pressure           Partial symptomatic epilepsy with complex partial seizures, not intractable, with status epilepticus (Multi)     neurology following   Keppra  Ativan as needed  Lacosamide         Moderate Alzheimer's dementia with anxiety, unspecified timing of dementia onset (Multi)     Monitor for safety  Assist with care as needed  Follow-up with neurology outpatient  Aricept          Medications, treatments, and labs reviewed  Continue medications and treatments as listed in EMR      Scribe Attestation  IJessica Scribe   attest that this documentation has been prepared under the direction and in the presence of Christopher Jaime MD.     Provider Attestation - Scribe documentation  All medical record entries made by the Scribe were at my direction and personally dictated by me. I have reviewed the chart and agree that the record accurately reflects my personal performance of the history, physical exam, discussion and plan.   Christopher Jaime MD      Electronically Signed By: Christopher Jaime MD   5/21/24  4:29 PM

## 2024-06-10 ENCOUNTER — NURSING HOME VISIT (OUTPATIENT)
Dept: POST ACUTE CARE | Facility: EXTERNAL LOCATION | Age: 74
End: 2024-06-10
Payer: COMMERCIAL

## 2024-06-10 DIAGNOSIS — E11.9 TYPE 2 DIABETES MELLITUS WITHOUT COMPLICATION, WITHOUT LONG-TERM CURRENT USE OF INSULIN (MULTI): ICD-10-CM

## 2024-06-10 DIAGNOSIS — I10 HYPERTENSION, ESSENTIAL: ICD-10-CM

## 2024-06-10 DIAGNOSIS — F02.B4 MODERATE ALZHEIMER'S DEMENTIA WITH ANXIETY, UNSPECIFIED TIMING OF DEMENTIA ONSET (MULTI): ICD-10-CM

## 2024-06-10 DIAGNOSIS — R42 DIZZINESS: Primary | ICD-10-CM

## 2024-06-10 DIAGNOSIS — G30.9 MODERATE ALZHEIMER'S DEMENTIA WITH ANXIETY, UNSPECIFIED TIMING OF DEMENTIA ONSET (MULTI): ICD-10-CM

## 2024-06-10 PROCEDURE — 99309 SBSQ NF CARE MODERATE MDM 30: CPT | Performed by: NURSE PRACTITIONER

## 2024-06-10 NOTE — LETTER
Patient: Estella Lanese  : 1950    Encounter Date: 06/10/2024    PROGRESS NOTE    Subjective  Chief complaint: Estella M Lanese is a 74 y.o. female who is a long term care patient being seen and evaluated for dizziness.    HPI:  HPI  Patient seen due to having complaints of dizziness, blood sugar 149, /93.  Patient is unable to give specifics on complaints.  Patient appears to be in no acute distress.    Objective  Vital signs: 120/93, 85, 18, 98.7, 97%, blood sugar 149    Physical Exam  Constitutional:       General: She is not in acute distress.  Eyes:      Extraocular Movements: Extraocular movements intact.   Cardiovascular:      Rate and Rhythm: Normal rate and regular rhythm.   Pulmonary:      Effort: Pulmonary effort is normal.      Breath sounds: Normal breath sounds.   Musculoskeletal:      Cervical back: Neck supple.      Right lower leg: No edema.      Left lower leg: No edema.   Neurological:      Mental Status: She is alert.   Psychiatric:         Behavior: Behavior is cooperative.         Assessment/Plan  Problem List Items Addressed This Visit       Type 2 diabetes mellitus without complication, without long-term current use of insulin (Multi)     Controlled  Monitor fasting blood sugar  Glucoscans         Dizziness - Primary     Obtain BMP, CBC lacosamide level, Keppra level  Start Antivert as needed         Hypertension, essential     Monitor blood pressure  BP at goal         Moderate Alzheimer's dementia with anxiety, unspecified timing of dementia onset (Multi)     Monitor for safety  Assist with care as needed  Follow-up with neurology outpatient  Aricept          Medications, treatments, and labs reviewed  Continue medications and treatments as listed in EMR    Scribe Attestation  Jocelyne BREWER Scribe   attest that this documentation has been prepared under the direction and in the presence of SHERRI Cherry    Provider Attestation - Scribe documentation  All  medical record entries made by the Scribe were at my direction and personally dictated by me. I have reviewed the chart and agree that the record accurately reflects my personal performance of the history, physical exam, discussion and plan.   SHERRI Cherry            Electronically Signed By: SHERRI Cherry   6/12/24  7:39 PM

## 2024-06-11 NOTE — ASSESSMENT & PLAN NOTE
Controlled  Monitor fasting blood sugar  Glucoscans  
Monitor blood pressure  BP at goal  
Monitor for safety  Assist with care as needed  Follow-up with neurology outpatient  Aricept  
Obtain BMP, CBC lacosamide level, Keppra level  Start Antivert as needed  
nl EF

## 2024-06-11 NOTE — PROGRESS NOTES
PROGRESS NOTE    Subjective   Chief complaint: Estella M Lanese is a 74 y.o. female who is a long term care patient being seen and evaluated for dizziness.    HPI:  HPI  Patient seen due to having complaints of dizziness, blood sugar 149, /93.  Patient is unable to give specifics on complaints.  Patient appears to be in no acute distress.    Objective   Vital signs: 120/93, 85, 18, 98.7, 97%, blood sugar 149    Physical Exam  Constitutional:       General: She is not in acute distress.  Eyes:      Extraocular Movements: Extraocular movements intact.   Cardiovascular:      Rate and Rhythm: Normal rate and regular rhythm.   Pulmonary:      Effort: Pulmonary effort is normal.      Breath sounds: Normal breath sounds.   Musculoskeletal:      Cervical back: Neck supple.      Right lower leg: No edema.      Left lower leg: No edema.   Neurological:      Mental Status: She is alert.   Psychiatric:         Behavior: Behavior is cooperative.         Assessment/Plan   Problem List Items Addressed This Visit       Type 2 diabetes mellitus without complication, without long-term current use of insulin (Multi)     Controlled  Monitor fasting blood sugar  Glucoscans         Dizziness - Primary     Obtain BMP, CBC lacosamide level, Keppra level  Start Antivert as needed         Hypertension, essential     Monitor blood pressure  BP at goal         Moderate Alzheimer's dementia with anxiety, unspecified timing of dementia onset (Multi)     Monitor for safety  Assist with care as needed  Follow-up with neurology outpatient  Aricept          Medications, treatments, and labs reviewed  Continue medications and treatments as listed in EMR    Scribe Attestation  Jocelyne BREWER Scribe   attest that this documentation has been prepared under the direction and in the presence of SHERRI Cherry    Provider Attestation - Scribe documentation  All medical record entries made by the Scribe were at my direction and  personally dictated by me. I have reviewed the chart and agree that the record accurately reflects my personal performance of the history, physical exam, discussion and plan.   Jeanette Arellano, APRN-CNP

## 2024-06-14 ENCOUNTER — NURSING HOME VISIT (OUTPATIENT)
Dept: POST ACUTE CARE | Facility: EXTERNAL LOCATION | Age: 74
End: 2024-06-14
Payer: COMMERCIAL

## 2024-06-14 DIAGNOSIS — I10 HYPERTENSION, ESSENTIAL: ICD-10-CM

## 2024-06-14 DIAGNOSIS — R42 DIZZINESS: Primary | ICD-10-CM

## 2024-06-14 DIAGNOSIS — F02.B4 MODERATE ALZHEIMER'S DEMENTIA WITH ANXIETY, UNSPECIFIED TIMING OF DEMENTIA ONSET (MULTI): ICD-10-CM

## 2024-06-14 DIAGNOSIS — G30.9 MODERATE ALZHEIMER'S DEMENTIA WITH ANXIETY, UNSPECIFIED TIMING OF DEMENTIA ONSET (MULTI): ICD-10-CM

## 2024-06-14 PROCEDURE — 99308 SBSQ NF CARE LOW MDM 20: CPT | Performed by: INTERNAL MEDICINE

## 2024-06-14 NOTE — LETTER
Patient: Estella Lanese  : 1950    Encounter Date: 2024    PROGRESS NOTE    Subjective  Chief complaint: Estella M Lanese is a 74 y.o. female who is a long term care patient being seen and evaluated for dizziness.    HPI:  HPI  Patient is seen in follow-up to dizziness.  Patient was started on as needed Antivert and orders placed to obtain BMP, CBC, Keppra level and lacosamide level.  Patient reported dizziness has improved.  Continue to monitor.    Objective  Vital signs: 146/70, 18, 85, 98.7, 97%, blood sugar, 99    Physical Exam  Constitutional:       General: She is not in acute distress.  Eyes:      Extraocular Movements: Extraocular movements intact.   Cardiovascular:      Rate and Rhythm: Normal rate and regular rhythm.   Pulmonary:      Effort: Pulmonary effort is normal.      Breath sounds: Normal breath sounds.   Musculoskeletal:      Cervical back: Neck supple.      Right lower leg: No edema.      Left lower leg: No edema.   Neurological:      Mental Status: She is alert.   Psychiatric:         Behavior: Behavior is cooperative.         Assessment/Plan  Problem List Items Addressed This Visit       Dizziness - Primary     Obtain BMP, CBC lacosamide level, Keppra level  Continue Antivert  Improving         Hypertension, essential     Monitor blood pressure         Moderate Alzheimer's dementia with anxiety, unspecified timing of dementia onset (Multi)     Monitor for safety  Assist with care as needed  Follow-up with neurology outpatient  Aricept          Medications, treatments, and labs reviewed  Continue medications and treatments as listed in EMR    Scribe Attestation  Jocelyne BREWER Scribe   attest that this documentation has been prepared under the direction and in the presence of Christopher Jaime MD    Provider Attestation - Scribe documentation  All medical record entries made by the Scribe were at my direction and personally dictated by me. I have reviewed the chart and agree that  the record accurately reflects my personal performance of the history, physical exam, discussion and plan.   Christopher Jaime MD            Electronically Signed By: Christopher Jaime MD   6/14/24  2:55 PM

## 2024-06-14 NOTE — PROGRESS NOTES
PROGRESS NOTE    Subjective   Chief complaint: Estella M Lanese is a 74 y.o. female who is a long term care patient being seen and evaluated for dizziness.    HPI:  HPI  Patient is seen in follow-up to dizziness.  Patient was started on as needed Antivert and orders placed to obtain BMP, CBC, Keppra level and lacosamide level.  Patient reported dizziness has improved.  Continue to monitor.    Objective   Vital signs: 146/70, 18, 85, 98.7, 97%, blood sugar, 99    Physical Exam  Constitutional:       General: She is not in acute distress.  Eyes:      Extraocular Movements: Extraocular movements intact.   Cardiovascular:      Rate and Rhythm: Normal rate and regular rhythm.   Pulmonary:      Effort: Pulmonary effort is normal.      Breath sounds: Normal breath sounds.   Musculoskeletal:      Cervical back: Neck supple.      Right lower leg: No edema.      Left lower leg: No edema.   Neurological:      Mental Status: She is alert.   Psychiatric:         Behavior: Behavior is cooperative.         Assessment/Plan   Problem List Items Addressed This Visit       Dizziness - Primary     Obtain BMP, CBC lacosamide level, Keppra level  Continue Antivert  Improving         Hypertension, essential     Monitor blood pressure         Moderate Alzheimer's dementia with anxiety, unspecified timing of dementia onset (Multi)     Monitor for safety  Assist with care as needed  Follow-up with neurology outpatient  Aricept          Medications, treatments, and labs reviewed  Continue medications and treatments as listed in EMR    Scribe Attestation  I, Kellen Alcantara   attest that this documentation has been prepared under the direction and in the presence of Christopher Jaime MD    Provider Attestation - Scribe documentation  All medical record entries made by the Scribe were at my direction and personally dictated by me. I have reviewed the chart and agree that the record accurately reflects my personal performance of the  history, physical exam, discussion and plan.   Christopher Jaime MD

## 2024-06-18 ENCOUNTER — NURSING HOME VISIT (OUTPATIENT)
Dept: POST ACUTE CARE | Facility: EXTERNAL LOCATION | Age: 74
End: 2024-06-18
Payer: COMMERCIAL

## 2024-06-18 DIAGNOSIS — F32.A DEPRESSION, UNSPECIFIED DEPRESSION TYPE: ICD-10-CM

## 2024-06-18 DIAGNOSIS — G40.201 PARTIAL SYMPTOMATIC EPILEPSY WITH COMPLEX PARTIAL SEIZURES, NOT INTRACTABLE, WITH STATUS EPILEPTICUS (MULTI): ICD-10-CM

## 2024-06-18 DIAGNOSIS — I10 HYPERTENSION, ESSENTIAL: ICD-10-CM

## 2024-06-18 DIAGNOSIS — E11.9 TYPE 2 DIABETES MELLITUS WITHOUT COMPLICATION, WITHOUT LONG-TERM CURRENT USE OF INSULIN (MULTI): ICD-10-CM

## 2024-06-18 DIAGNOSIS — G30.9 MODERATE ALZHEIMER'S DEMENTIA WITH ANXIETY, UNSPECIFIED TIMING OF DEMENTIA ONSET (MULTI): ICD-10-CM

## 2024-06-18 DIAGNOSIS — F02.B4 MODERATE ALZHEIMER'S DEMENTIA WITH ANXIETY, UNSPECIFIED TIMING OF DEMENTIA ONSET (MULTI): ICD-10-CM

## 2024-06-18 DIAGNOSIS — E78.5 HYPERLIPIDEMIA, UNSPECIFIED HYPERLIPIDEMIA TYPE: ICD-10-CM

## 2024-06-18 PROCEDURE — 99309 SBSQ NF CARE MODERATE MDM 30: CPT | Performed by: INTERNAL MEDICINE

## 2024-06-18 NOTE — LETTER
Patient: Estella Lanese  : 1950    Encounter Date: 2024    PROGRESS NOTE    Subjective  Chief complaint: Estella M Lanese is a 74 y.o. female who is a long term care patient being seen and evaluated for monthly general medical care and follow-up    HPI:  Patient presents for general medical care and f/u.  Patient seen and examined at bedside. Patient has dementia and requires assistance with ADLs. T2DM, Patient denies vision changes, excessive thirst, sweating, urinary frequency.  HTN, denies fatigue, sob, and palpitations. Seizures controlled. No recent seizures controlled. No adverse effects noted tolerating antiepileptic medications. Denies changes in weakness or speech. Patient has diagnosis of GERD, denies heartburn, regurgitation, epigastric discomfort, sour taste, and cough. Patient has a diagnosis of hyperlipidemia, denies any muscle aches.  No issues per nursing.  Patient has no acute complaints.       Objective  Vital signs: 146/70,85,97%,    Physical Exam  Constitutional:       General: She is not in acute distress.  Eyes:      Extraocular Movements: Extraocular movements intact.   Cardiovascular:      Rate and Rhythm: Normal rate and regular rhythm.   Pulmonary:      Effort: Pulmonary effort is normal.      Breath sounds: Normal breath sounds.   Musculoskeletal:      Cervical back: Neck supple.      Right lower leg: No edema.      Left lower leg: No edema.   Neurological:      Mental Status: She is alert.   Psychiatric:         Behavior: Behavior is cooperative.         Assessment/Plan  Problem List Items Addressed This Visit       Depression     Continue current antidepressant meds  Monitor mood         Type 2 diabetes mellitus without complication, without long-term current use of insulin (Multi)     Controlled  Monitor fasting blood sugar  Glucoscans         Hypertension, essential     Monitor blood pressure         Partial symptomatic epilepsy with complex partial seizures, not  intractable, with status epilepticus (Multi)     neurology following   Keppra  Ativan as needed  Lacosamide         Moderate Alzheimer's dementia with anxiety, unspecified timing of dementia onset (Multi)     Monitor for safety  Assist with care as needed  Follow-up with neurology outpatient  Aricept         HLD (hyperlipidemia)     Statin  Monitor labs          Medications, treatments, and labs reviewed  Continue medications and treatments as listed in EMR      Scribe Attestation  I, Kellen Blum   attest that this documentation has been prepared under the direction and in the presence of Christopher Jaime MD.     Provider Attestation - Scribe documentation  All medical record entries made by the Scribe were at my direction and personally dictated by me. I have reviewed the chart and agree that the record accurately reflects my personal performance of the history, physical exam, discussion and plan.   Christopher Jaime MD      Electronically Signed By: Christopher Jaime MD   6/18/24  3:49 PM

## 2024-06-18 NOTE — PROGRESS NOTES
PROGRESS NOTE    Subjective   Chief complaint: Estella M Lanese is a 74 y.o. female who is a long term care patient being seen and evaluated for monthly general medical care and follow-up    HPI:  Patient presents for general medical care and f/u.  Patient seen and examined at bedside. Patient has dementia and requires assistance with ADLs. T2DM, Patient denies vision changes, excessive thirst, sweating, urinary frequency.  HTN, denies fatigue, sob, and palpitations. Seizures controlled. No recent seizures controlled. No adverse effects noted tolerating antiepileptic medications. Denies changes in weakness or speech. Patient has diagnosis of GERD, denies heartburn, regurgitation, epigastric discomfort, sour taste, and cough. Patient has a diagnosis of hyperlipidemia, denies any muscle aches.  No issues per nursing.  Patient has no acute complaints.       Objective   Vital signs: 146/70,85,97%,    Physical Exam  Constitutional:       General: She is not in acute distress.  Eyes:      Extraocular Movements: Extraocular movements intact.   Cardiovascular:      Rate and Rhythm: Normal rate and regular rhythm.   Pulmonary:      Effort: Pulmonary effort is normal.      Breath sounds: Normal breath sounds.   Musculoskeletal:      Cervical back: Neck supple.      Right lower leg: No edema.      Left lower leg: No edema.   Neurological:      Mental Status: She is alert.   Psychiatric:         Behavior: Behavior is cooperative.         Assessment/Plan   Problem List Items Addressed This Visit       Depression     Continue current antidepressant meds  Monitor mood         Type 2 diabetes mellitus without complication, without long-term current use of insulin (Multi)     Controlled  Monitor fasting blood sugar  Glucoscans         Hypertension, essential     Monitor blood pressure         Partial symptomatic epilepsy with complex partial seizures, not intractable, with status epilepticus (Multi)     neurology following    Keppra  Ativan as needed  Lacosamide         Moderate Alzheimer's dementia with anxiety, unspecified timing of dementia onset (Multi)     Monitor for safety  Assist with care as needed  Follow-up with neurology outpatient  Aricept         HLD (hyperlipidemia)     Statin  Monitor labs          Medications, treatments, and labs reviewed  Continue medications and treatments as listed in EMR      Scribe Attestation  Jessica BREWER Scribe   attest that this documentation has been prepared under the direction and in the presence of Christopher Jaime MD.     Provider Attestation - Scribe documentation  All medical record entries made by the Scribe were at my direction and personally dictated by me. I have reviewed the chart and agree that the record accurately reflects my personal performance of the history, physical exam, discussion and plan.   Christopher Jaime MD

## 2024-06-20 ENCOUNTER — APPOINTMENT (OUTPATIENT)
Dept: NEUROLOGY | Facility: CLINIC | Age: 74
End: 2024-06-20
Payer: COMMERCIAL

## 2024-07-16 ENCOUNTER — NURSING HOME VISIT (OUTPATIENT)
Dept: POST ACUTE CARE | Facility: EXTERNAL LOCATION | Age: 74
End: 2024-07-16
Payer: COMMERCIAL

## 2024-07-16 DIAGNOSIS — G40.201 PARTIAL SYMPTOMATIC EPILEPSY WITH COMPLEX PARTIAL SEIZURES, NOT INTRACTABLE, WITH STATUS EPILEPTICUS (MULTI): ICD-10-CM

## 2024-07-16 DIAGNOSIS — F32.A DEPRESSION, UNSPECIFIED DEPRESSION TYPE: ICD-10-CM

## 2024-07-16 DIAGNOSIS — K21.9 GASTROESOPHAGEAL REFLUX DISEASE WITHOUT ESOPHAGITIS: ICD-10-CM

## 2024-07-16 DIAGNOSIS — F02.B4 MODERATE ALZHEIMER'S DEMENTIA WITH ANXIETY, UNSPECIFIED TIMING OF DEMENTIA ONSET (MULTI): ICD-10-CM

## 2024-07-16 DIAGNOSIS — E78.5 HYPERLIPIDEMIA, UNSPECIFIED HYPERLIPIDEMIA TYPE: ICD-10-CM

## 2024-07-16 DIAGNOSIS — I10 HYPERTENSION, ESSENTIAL: ICD-10-CM

## 2024-07-16 DIAGNOSIS — G30.9 MODERATE ALZHEIMER'S DEMENTIA WITH ANXIETY, UNSPECIFIED TIMING OF DEMENTIA ONSET (MULTI): ICD-10-CM

## 2024-07-16 DIAGNOSIS — E11.9 TYPE 2 DIABETES MELLITUS WITHOUT COMPLICATION, WITHOUT LONG-TERM CURRENT USE OF INSULIN (MULTI): ICD-10-CM

## 2024-07-16 PROCEDURE — 99309 SBSQ NF CARE MODERATE MDM 30: CPT | Performed by: INTERNAL MEDICINE

## 2024-07-16 NOTE — LETTER
Patient: Estella Lanese  : 1950    Encounter Date: 2024    PROGRESS NOTE    Subjective  Chief complaint: Estella M Lanese is a 74 y.o. female who is a long term care patient being seen and evaluated for monthly general medical care and follow-up    HPI:  Patient presents for general medical care and f/u.  Patient seen and examined at bedside. Patient has dementia and requires assistance with ADLs. T2DM, Patient denies vision changes, excessive thirst, sweating, urinary frequency.  HTN, denies fatigue, sob, and palpitations. Seizures controlled. No recent seizures controlled. No adverse effects noted tolerating antiepileptic medications. Denies changes in weakness or speech. Patient has diagnosis of GERD, denies heartburn, regurgitation, epigastric discomfort, sour taste, and cough. Patient has a diagnosis of hyperlipidemia, denies any muscle aches.  No issues per nursing.  Patient has no acute complaints.       Objective  Vital signs: 146/70,85,97%, BS 85    Physical Exam  Constitutional:       General: She is not in acute distress.  Eyes:      Extraocular Movements: Extraocular movements intact.   Cardiovascular:      Rate and Rhythm: Normal rate and regular rhythm.   Pulmonary:      Effort: Pulmonary effort is normal.      Breath sounds: Normal breath sounds.   Musculoskeletal:      Cervical back: Neck supple.      Right lower leg: No edema.      Left lower leg: No edema.   Neurological:      Mental Status: She is alert.   Psychiatric:         Behavior: Behavior is cooperative.         Assessment/Plan  Problem List Items Addressed This Visit       Depression     Continue current antidepressant meds  Monitor mood         Type 2 diabetes mellitus without complication, without long-term current use of insulin (Multi)     Controlled  Monitor fasting blood sugar  Glucoscans         GERD (gastroesophageal reflux disease)     Monitor GI symptoms  PPI         Hypertension, essential     Monitor blood  pressure         Partial symptomatic epilepsy with complex partial seizures, not intractable, with status epilepticus (Multi)     neurology following   Keppra  Ativan as needed  Lacosamide         Moderate Alzheimer's dementia with anxiety, unspecified timing of dementia onset (Multi)     Monitor for safety  Assist with care as needed  Follow-up with neurology outpatient  Aricept         HLD (hyperlipidemia)     Statin  Monitor labs          Medications, treatments, and labs reviewed  Continue medications and treatments as listed in EMR      Scribe Attestation  IJessica Scribe   attest that this documentation has been prepared under the direction and in the presence of Christopher Jaime MD.     Provider Attestation - Scribe documentation  All medical record entries made by the Scribe were at my direction and personally dictated by me. I have reviewed the chart and agree that the record accurately reflects my personal performance of the history, physical exam, discussion and plan.   Christopher Jaiem MD      Electronically Signed By: Christopher Jaime MD   7/16/24  1:39 PM

## 2024-07-16 NOTE — PROGRESS NOTES
PROGRESS NOTE    Subjective   Chief complaint: Estella M Lanese is a 74 y.o. female who is a long term care patient being seen and evaluated for monthly general medical care and follow-up    HPI:  Patient presents for general medical care and f/u.  Patient seen and examined at bedside. Patient has dementia and requires assistance with ADLs. T2DM, Patient denies vision changes, excessive thirst, sweating, urinary frequency.  HTN, denies fatigue, sob, and palpitations. Seizures controlled. No recent seizures controlled. No adverse effects noted tolerating antiepileptic medications. Denies changes in weakness or speech. Patient has diagnosis of GERD, denies heartburn, regurgitation, epigastric discomfort, sour taste, and cough. Patient has a diagnosis of hyperlipidemia, denies any muscle aches.  No issues per nursing.  Patient has no acute complaints.       Objective   Vital signs: 146/70,85,97%, BS 85    Physical Exam  Constitutional:       General: She is not in acute distress.  Eyes:      Extraocular Movements: Extraocular movements intact.   Cardiovascular:      Rate and Rhythm: Normal rate and regular rhythm.   Pulmonary:      Effort: Pulmonary effort is normal.      Breath sounds: Normal breath sounds.   Musculoskeletal:      Cervical back: Neck supple.      Right lower leg: No edema.      Left lower leg: No edema.   Neurological:      Mental Status: She is alert.   Psychiatric:         Behavior: Behavior is cooperative.         Assessment/Plan   Problem List Items Addressed This Visit       Depression     Continue current antidepressant meds  Monitor mood         Type 2 diabetes mellitus without complication, without long-term current use of insulin (Multi)     Controlled  Monitor fasting blood sugar  Glucoscans         GERD (gastroesophageal reflux disease)     Monitor GI symptoms  PPI         Hypertension, essential     Monitor blood pressure         Partial symptomatic epilepsy with complex partial seizures,  not intractable, with status epilepticus (Multi)     neurology following   Keppra  Ativan as needed  Lacosamide         Moderate Alzheimer's dementia with anxiety, unspecified timing of dementia onset (Multi)     Monitor for safety  Assist with care as needed  Follow-up with neurology outpatient  Aricept         HLD (hyperlipidemia)     Statin  Monitor labs          Medications, treatments, and labs reviewed  Continue medications and treatments as listed in EMR      Scribe Attestation  I, Kellen Blum   attest that this documentation has been prepared under the direction and in the presence of Christopher Jaime MD.     Provider Attestation - Scribe documentation  All medical record entries made by the Scribe were at my direction and personally dictated by me. I have reviewed the chart and agree that the record accurately reflects my personal performance of the history, physical exam, discussion and plan.   Christopher Jaime MD

## 2024-08-20 ENCOUNTER — NURSING HOME VISIT (OUTPATIENT)
Dept: POST ACUTE CARE | Facility: EXTERNAL LOCATION | Age: 74
End: 2024-08-20
Payer: COMMERCIAL

## 2024-08-20 DIAGNOSIS — G40.201 PARTIAL SYMPTOMATIC EPILEPSY WITH COMPLEX PARTIAL SEIZURES, NOT INTRACTABLE, WITH STATUS EPILEPTICUS (MULTI): ICD-10-CM

## 2024-08-20 DIAGNOSIS — G30.9 MODERATE ALZHEIMER'S DEMENTIA WITH ANXIETY, UNSPECIFIED TIMING OF DEMENTIA ONSET (MULTI): ICD-10-CM

## 2024-08-20 DIAGNOSIS — K21.9 GASTROESOPHAGEAL REFLUX DISEASE WITHOUT ESOPHAGITIS: ICD-10-CM

## 2024-08-20 DIAGNOSIS — E11.9 TYPE 2 DIABETES MELLITUS WITHOUT COMPLICATION, WITHOUT LONG-TERM CURRENT USE OF INSULIN (MULTI): Primary | ICD-10-CM

## 2024-08-20 DIAGNOSIS — I10 HYPERTENSION, ESSENTIAL: ICD-10-CM

## 2024-08-20 DIAGNOSIS — F02.B4 MODERATE ALZHEIMER'S DEMENTIA WITH ANXIETY, UNSPECIFIED TIMING OF DEMENTIA ONSET (MULTI): ICD-10-CM

## 2024-08-20 DIAGNOSIS — F32.A DEPRESSION, UNSPECIFIED DEPRESSION TYPE: ICD-10-CM

## 2024-08-20 PROCEDURE — 99309 SBSQ NF CARE MODERATE MDM 30: CPT | Performed by: INTERNAL MEDICINE

## 2024-08-20 NOTE — PROGRESS NOTES
PROGRESS NOTE    Subjective   Chief complaint: Estella M Lanese is a 74 y.o. female who is a long term care patient being seen and evaluated for monthly general medical care and follow-up    HPI:  HPI  Patient presents for general medical care and f/u.  Patient seen and examined at bedside.  No issues per nursing.  Patient has no acute complaints.  DM, denies polydipsia polyuria polyphagia.  HTN BP at goal.  Denies chest pain and headache.  GERD controlled.  Denies heartburn, regurgitation, epigastric discomfort, sour taste, and cough.  Seizures controlled with no breakthrough seizure activity.  Tolerating antiepileptic with no increased drowsiness or sedation.  Patient has dementia and requires assist with ADLs.  Patient with diagnosis of depression.  Mood is stable.  Denies feeling down and thoughts of harming self or others.  Mentation at baseline, no acute distress.    Objective   Vital signs: 131/67, 76, 18, 97.6, 98%, blood sugar, 99    Physical Exam  Constitutional:       General: She is not in acute distress.  Eyes:      Extraocular Movements: Extraocular movements intact.   Cardiovascular:      Rate and Rhythm: Normal rate and regular rhythm.   Pulmonary:      Effort: Pulmonary effort is normal.      Breath sounds: Normal breath sounds.   Musculoskeletal:      Cervical back: Neck supple.   Neurological:      Mental Status: She is alert.   Psychiatric:         Behavior: Behavior is cooperative.         Assessment/Plan   Problem List Items Addressed This Visit       Depression     Monitor mood and behaviors         Type 2 diabetes mellitus without complication, without long-term current use of insulin (Multi) - Primary     Controlled  Monitor fasting blood sugar  Glucoscans         GERD (gastroesophageal reflux disease)     Monitor GI symptoms  PPI         Hypertension, essential     Monitor blood pressure         Partial symptomatic epilepsy with complex partial seizures, not intractable, with status  epilepticus (Multi)     Follow-up with neurology outpatient  Keppra  Ativan as needed  Lacosamide         Moderate Alzheimer's dementia with anxiety, unspecified timing of dementia onset (Multi)     Monitor for safety  Assist with care as needed  Follow-up with neurology outpatient  Aricept          Medications, treatments, and labs reviewed  Continue medications and treatments as listed in EMR    Scribe Attestation  Jocelyne BREWER Scribe   attest that this documentation has been prepared under the direction and in the presence of Christopher Jaime MD    Provider Attestation - Scribe documentation  All medical record entries made by the Scribe were at my direction and personally dictated by me. I have reviewed the chart and agree that the record accurately reflects my personal performance of the history, physical exam, discussion and plan.   Christopher Jaime MD

## 2024-08-20 NOTE — LETTER
Patient: Estella Lanese  : 1950    Encounter Date: 2024    PROGRESS NOTE    Subjective  Chief complaint: Estella M Lanese is a 74 y.o. female who is a long term care patient being seen and evaluated for monthly general medical care and follow-up    HPI:  HPI  Patient presents for general medical care and f/u.  Patient seen and examined at bedside.  No issues per nursing.  Patient has no acute complaints.  DM, denies polydipsia polyuria polyphagia.  HTN BP at goal.  Denies chest pain and headache.  GERD controlled.  Denies heartburn, regurgitation, epigastric discomfort, sour taste, and cough.  Seizures controlled with no breakthrough seizure activity.  Tolerating antiepileptic with no increased drowsiness or sedation.  Patient has dementia and requires assist with ADLs.  Patient with diagnosis of depression.  Mood is stable.  Denies feeling down and thoughts of harming self or others.  Mentation at baseline, no acute distress.    Objective  Vital signs: 131/67, 76, 18, 97.6, 98%, blood sugar, 99    Physical Exam  Constitutional:       General: She is not in acute distress.  Eyes:      Extraocular Movements: Extraocular movements intact.   Cardiovascular:      Rate and Rhythm: Normal rate and regular rhythm.   Pulmonary:      Effort: Pulmonary effort is normal.      Breath sounds: Normal breath sounds.   Musculoskeletal:      Cervical back: Neck supple.   Neurological:      Mental Status: She is alert.   Psychiatric:         Behavior: Behavior is cooperative.         Assessment/Plan  Problem List Items Addressed This Visit       Depression     Monitor mood and behaviors         Type 2 diabetes mellitus without complication, without long-term current use of insulin (Multi) - Primary     Controlled  Monitor fasting blood sugar  Glucoscans         GERD (gastroesophageal reflux disease)     Monitor GI symptoms  PPI         Hypertension, essential     Monitor blood pressure         Partial symptomatic  epilepsy with complex partial seizures, not intractable, with status epilepticus (Multi)     Follow-up with neurology outpatient  Keppra  Ativan as needed  Lacosamide         Moderate Alzheimer's dementia with anxiety, unspecified timing of dementia onset (Multi)     Monitor for safety  Assist with care as needed  Follow-up with neurology outpatient  Aricept          Medications, treatments, and labs reviewed  Continue medications and treatments as listed in EMR    Scribe Attestation  I, Kellen Alcantara   attest that this documentation has been prepared under the direction and in the presence of Christopher Jaime MD    Provider Attestation - Scribe documentation  All medical record entries made by the Scribe were at my direction and personally dictated by me. I have reviewed the chart and agree that the record accurately reflects my personal performance of the history, physical exam, discussion and plan.   Christopher Jaime MD            Electronically Signed By: Christopher Jaime MD   8/20/24  3:22 PM

## 2024-09-17 ENCOUNTER — NURSING HOME VISIT (OUTPATIENT)
Dept: POST ACUTE CARE | Facility: EXTERNAL LOCATION | Age: 74
End: 2024-09-17
Payer: COMMERCIAL

## 2024-09-17 DIAGNOSIS — F32.A DEPRESSION, UNSPECIFIED DEPRESSION TYPE: ICD-10-CM

## 2024-09-17 DIAGNOSIS — I10 HYPERTENSION, ESSENTIAL: Primary | ICD-10-CM

## 2024-09-17 DIAGNOSIS — F02.B4 MODERATE ALZHEIMER'S DEMENTIA WITH ANXIETY, UNSPECIFIED TIMING OF DEMENTIA ONSET (MULTI): ICD-10-CM

## 2024-09-17 DIAGNOSIS — E11.9 TYPE 2 DIABETES MELLITUS WITHOUT COMPLICATION, WITHOUT LONG-TERM CURRENT USE OF INSULIN (MULTI): ICD-10-CM

## 2024-09-17 DIAGNOSIS — K21.9 GASTROESOPHAGEAL REFLUX DISEASE WITHOUT ESOPHAGITIS: ICD-10-CM

## 2024-09-17 DIAGNOSIS — G30.9 MODERATE ALZHEIMER'S DEMENTIA WITH ANXIETY, UNSPECIFIED TIMING OF DEMENTIA ONSET (MULTI): ICD-10-CM

## 2024-09-17 PROCEDURE — 99309 SBSQ NF CARE MODERATE MDM 30: CPT | Performed by: INTERNAL MEDICINE

## 2024-09-17 NOTE — LETTER
Patient: Estella Lanese  : 1950    Encounter Date: 2024    PROGRESS NOTE    Subjective  Chief complaint: Estella M Lanese is a 74 y.o. female who is a long term care patient being seen and evaluated for monthly general medical care and follow-up    HPI:  HPI  An interactive audio and/or video telecommunication system which permits real time communications between the patient (at the originating site) and provider (at a distant site) was utilized to provide this telehealth service after obtaining verbal consent.  Patient presents for general medical care and f/u.  No issues per nursing.  Patient has no acute complaints.  HTN  Denies chest pain and headache.  Patient has dementia and requires assist with ADLs.  GERD controlled.  Denies heartburn, regurgitation, epigastric discomfort, sour taste, and cough.  DM, denies polydipsia polyuria polyphagia.  Patient with diagnosis of depression.  Mood is stable.  Denies feeling down and thoughts of harming self or others.  Mentation at baseline, no acute distress.    Objective  Vital signs: 132/61, 74, 18, 97.3, 97% blood sugar 93    Physical Exam  Constitutional:       General: She is not in acute distress.  Eyes:      Extraocular Movements: Extraocular movements intact.   Pulmonary:      Effort: Pulmonary effort is normal.   Musculoskeletal:      Cervical back: Neck supple.   Neurological:      Mental Status: She is alert.   Psychiatric:         Mood and Affect: Mood normal.         Behavior: Behavior is cooperative.         Assessment/Plan  Problem List Items Addressed This Visit       Depression     Monitor mood and behaviors         Type 2 diabetes mellitus without complication, without long-term current use of insulin (Multi)     Controlled  Monitor fasting blood sugar  Glucoscans         GERD (gastroesophageal reflux disease)     Monitor GI symptoms  PPI         Hypertension, essential - Primary     Monitor blood pressure         Moderate Alzheimer's  dementia with anxiety, unspecified timing of dementia onset (Multi)     Monitor for safety  Assist with care as needed  Follow-up with neurology outpatient  Aricept          Medications, treatments, and labs reviewed  Continue medications and treatments as listed in EMR    Scribe Attestation  Jocelyne BREWER Scribe   attest that this documentation has been prepared under the direction and in the presence of Christopher Jaime MD    Provider Attestation - Scribe documentation  All medical record entries made by the Scribe were at my direction and personally dictated by me. I have reviewed the chart and agree that the record accurately reflects my personal performance of the history, physical exam, discussion and plan.   Christopher Jaime MD            Electronically Signed By: Christopher Jaime MD   9/18/24  8:36 PM

## 2024-10-10 PROCEDURE — 99222 1ST HOSP IP/OBS MODERATE 55: CPT | Performed by: INTERNAL MEDICINE

## 2024-10-15 ENCOUNTER — NURSING HOME VISIT (OUTPATIENT)
Dept: POST ACUTE CARE | Facility: EXTERNAL LOCATION | Age: 74
End: 2024-10-15

## 2024-10-15 DIAGNOSIS — G30.9 MODERATE ALZHEIMER'S DEMENTIA WITH ANXIETY, UNSPECIFIED TIMING OF DEMENTIA ONSET (MULTI): ICD-10-CM

## 2024-10-15 DIAGNOSIS — I10 HYPERTENSION, ESSENTIAL: ICD-10-CM

## 2024-10-15 DIAGNOSIS — E78.5 HYPERLIPIDEMIA, UNSPECIFIED HYPERLIPIDEMIA TYPE: ICD-10-CM

## 2024-10-15 DIAGNOSIS — E11.9 TYPE 2 DIABETES MELLITUS WITHOUT COMPLICATION, WITHOUT LONG-TERM CURRENT USE OF INSULIN (MULTI): ICD-10-CM

## 2024-10-15 DIAGNOSIS — F02.B4 MODERATE ALZHEIMER'S DEMENTIA WITH ANXIETY, UNSPECIFIED TIMING OF DEMENTIA ONSET (MULTI): ICD-10-CM

## 2024-10-15 DIAGNOSIS — K21.9 GASTROESOPHAGEAL REFLUX DISEASE WITHOUT ESOPHAGITIS: ICD-10-CM

## 2024-10-15 DIAGNOSIS — G40.201 PARTIAL SYMPTOMATIC EPILEPSY WITH COMPLEX PARTIAL SEIZURES, NOT INTRACTABLE, WITH STATUS EPILEPTICUS (MULTI): Primary | ICD-10-CM

## 2024-10-15 PROCEDURE — 99305 1ST NF CARE MODERATE MDM 35: CPT | Performed by: INTERNAL MEDICINE

## 2024-10-15 NOTE — PROGRESS NOTES
HISTORY & PHYSICAL    Subjective   Chief complaint: Estella M Lanese is a 74 y.o. female who is being seen and evaluated for multiple medical problems.  Patient readmitted to  after recent hospitalization.    HPI:  HPI  Patient is a 74-year-old female presenting for admission to nursing facility following hospitalization.  Patient had presented to ED with breakthrough seizure.  Patient has a past history significant for dementia, epilepsy due to temporal lobe resection, depression, GERD, type 2 diabetes, hypertension.  Patient noted with acute encephalopathy felt to be postictal versus behavioral versus metabolic.  Patient's labs were stable with no signs of infection.  Patient was followed by neurology with recommendations to continue anticonvulsant medications as patient may not have received full dose given onset of seizure..  Patient was hemodynamically stable to discharge back to  for continued medical management.  Patient was seen and examined at the bedside, appears to be in no acute distress.  Patient denies nausea vomiting fever chills.  Past Medical History:   Diagnosis Date    Acute sinusitis, unspecified 06/10/2013    Acute sinusitis    Age-related osteoporosis without current pathological fracture 06/10/2013    Osteoporosis    Atrophic disorder of skin, unspecified 06/10/2013    Atrophoderma    Benign neoplasm of unspecified breast 06/10/2013    Benign neoplasm of female breast    Dementia     Depression     Encounter for general adult medical examination without abnormal findings 11/12/2018    Encounter for annual health examination    Encounter for general adult medical examination without abnormal findings 11/12/2021    Encounter for annual health examination    Encounter for general adult medical examination without abnormal findings 06/21/2021    Encounter for annual health examination    Encounter for general adult medical examination without abnormal findings     Encounter for annual health  examination    Encounter for screening mammogram for malignant neoplasm of breast     Visit for screening mammogram    Excessive daytime sleepiness 01/19/2023    GERD (gastroesophageal reflux disease)     Nausea 02/19/2014    Nausea    Other conditions influencing health status     Hemigastrectomy    Personal history of diseases of the skin and subcutaneous tissue 06/10/2013    History of skin disorder    Personal history of other diseases of the circulatory system     History of hypertension    Personal history of other diseases of the respiratory system 12/22/2017    History of bronchitis    Personal history of other endocrine, nutritional and metabolic disease     History of high cholesterol    Personal history of other specified conditions 08/13/2019    History of dizziness    Seizures (Multi)     Suicidal ideation 01/19/2023    Type 2 diabetes mellitus without complications (Multi) 12/07/2022    Diabetes mellitus    Vertigo 01/19/2023       Past Surgical History:   Procedure Laterality Date    COLONOSCOPY  03/28/2013    Complete Colonoscopy    OTHER SURGICAL HISTORY  03/28/2013    Brain Surgery    OTHER SURGICAL HISTORY  03/28/2013    Excision Of Lesion Genitalia       Family History   Problem Relation Name Age of Onset    Alzheimer's disease Mother      Diabetes Mother      Sleep apnea Mother      Heart attack Father      Other (malignant neoplasm of breast) Sister      Other (Coronary Artery Surgery) Brother      Other (Multiple family members have migrane headaches) Other         Social History     Socioeconomic History    Marital status:    Tobacco Use    Smoking status: Never    Smokeless tobacco: Never   Vaping Use    Vaping status: Never Used   Substance and Sexual Activity    Alcohol use: Not Currently    Drug use: Never    Sexual activity: Not Currently     Social Determinants of Health     Food Insecurity: Unknown (1/25/2024)    Received from [facility], [facility] Vital  Sign     Worried About Running Out of Food in the Last Year: Never true       Vital signs: 134/66, 76, 16, 98.1, 97%    Objective   Physical Exam  Constitutional:       General: She is not in acute distress.  Eyes:      Extraocular Movements: Extraocular movements intact.   Cardiovascular:      Rate and Rhythm: Normal rate and regular rhythm.   Pulmonary:      Effort: Pulmonary effort is normal.      Breath sounds: Normal breath sounds.   Musculoskeletal:      Cervical back: Neck supple.   Neurological:      Mental Status: She is alert.   Psychiatric:         Behavior: Behavior is cooperative.         Assessment/Plan   Problem List Items Addressed This Visit       Type 2 diabetes mellitus without complication, without long-term current use of insulin (Multi)     Controlled  Monitor fasting blood sugar  Monitor for signs and symptoms of hyper or hypoglycemia         GERD (gastroesophageal reflux disease)     Monitor GI symptoms  PPI         Hypertension, essential     Monitor blood pressure         Partial symptomatic epilepsy with complex partial seizures, not intractable, with status epilepticus (Multi) - Primary     Follow-up with neurology outpatient  Keppra  Ativan as needed  Lacosamide  Monitor Keppra level         Moderate Alzheimer's dementia with anxiety, unspecified timing of dementia onset (Multi)     Monitor for safety  Assist with care as needed  Follow-up with neurology outpatient  Aricept         HLD (hyperlipidemia)     Statin  Monitor labs          Hospital records reviewed  Medications, treatments, and labs reviewed  Continue medications and treatments as listed in EMR  Discussed with nursing and therapy      Scribe Attestation  I, Kellen Alcantara   attest that this documentation has been prepared under the direction and in the presence of Christopher Jaime MD    Provider Attestation - Scribe documentation  All medical record entries made by the Scribe were at my direction and personally  dictated by me. I have reviewed the chart and agree that the record accurately reflects my personal performance of the history, physical exam, discussion and plan.   Christopher Jaime MD

## 2024-10-15 NOTE — LETTER
Patient: Estella Lanese  : 1950    Encounter Date: 10/15/2024    HISTORY & PHYSICAL    Subjective  Chief complaint: Estella M Lanese is a 74 y.o. female who is being seen and evaluated for multiple medical problems.  Patient readmitted to  after recent hospitalization.    HPI:  HPI  Patient is a 74-year-old female presenting for admission to nursing facility following hospitalization.  Patient had presented to ED with breakthrough seizure.  Patient has a past history significant for dementia, epilepsy due to temporal lobe resection, depression, GERD, type 2 diabetes, hypertension.  Patient noted with acute encephalopathy felt to be postictal versus behavioral versus metabolic.  Patient's labs were stable with no signs of infection.  Patient was followed by neurology with recommendations to continue anticonvulsant medications as patient may not have received full dose given onset of seizure..  Patient was hemodynamically stable to discharge back to  for continued medical management.  Patient was seen and examined at the bedside, appears to be in no acute distress.  Patient denies nausea vomiting fever chills.  Past Medical History:   Diagnosis Date   • Acute sinusitis, unspecified 06/10/2013    Acute sinusitis   • Age-related osteoporosis without current pathological fracture 06/10/2013    Osteoporosis   • Atrophic disorder of skin, unspecified 06/10/2013    Atrophoderma   • Benign neoplasm of unspecified breast 06/10/2013    Benign neoplasm of female breast   • Dementia    • Depression    • Encounter for general adult medical examination without abnormal findings 2018    Encounter for annual health examination   • Encounter for general adult medical examination without abnormal findings 2021    Encounter for annual health examination   • Encounter for general adult medical examination without abnormal findings 2021    Encounter for annual health examination   • Encounter for general  adult medical examination without abnormal findings     Encounter for annual health examination   • Encounter for screening mammogram for malignant neoplasm of breast     Visit for screening mammogram   • Excessive daytime sleepiness 01/19/2023   • GERD (gastroesophageal reflux disease)    • Nausea 02/19/2014    Nausea   • Other conditions influencing health status     Hemigastrectomy   • Personal history of diseases of the skin and subcutaneous tissue 06/10/2013    History of skin disorder   • Personal history of other diseases of the circulatory system     History of hypertension   • Personal history of other diseases of the respiratory system 12/22/2017    History of bronchitis   • Personal history of other endocrine, nutritional and metabolic disease     History of high cholesterol   • Personal history of other specified conditions 08/13/2019    History of dizziness   • Seizures (Multi)    • Suicidal ideation 01/19/2023   • Type 2 diabetes mellitus without complications (Multi) 12/07/2022    Diabetes mellitus   • Vertigo 01/19/2023       Past Surgical History:   Procedure Laterality Date   • COLONOSCOPY  03/28/2013    Complete Colonoscopy   • OTHER SURGICAL HISTORY  03/28/2013    Brain Surgery   • OTHER SURGICAL HISTORY  03/28/2013    Excision Of Lesion Genitalia       Family History   Problem Relation Name Age of Onset   • Alzheimer's disease Mother     • Diabetes Mother     • Sleep apnea Mother     • Heart attack Father     • Other (malignant neoplasm of breast) Sister     • Other (Coronary Artery Surgery) Brother     • Other (Multiple family members have migrane headaches) Other         Social History     Socioeconomic History   • Marital status:    Tobacco Use   • Smoking status: Never   • Smokeless tobacco: Never   Vaping Use   • Vaping status: Never Used   Substance and Sexual Activity   • Alcohol use: Not Currently   • Drug use: Never   • Sexual activity: Not Currently     Social Determinants of  Health     Food Insecurity: Unknown (1/25/2024)    [facility], [facility]    Hunger Vital Sign    • Worried About Running Out of Food in the Last Year: Never true       Vital signs: 134/66, 76, 16, 98.1, 97%    Objective  Physical Exam  Constitutional:       General: She is not in acute distress.  Eyes:      Extraocular Movements: Extraocular movements intact.   Cardiovascular:      Rate and Rhythm: Normal rate and regular rhythm.   Pulmonary:      Effort: Pulmonary effort is normal.      Breath sounds: Normal breath sounds.   Musculoskeletal:      Cervical back: Neck supple.   Neurological:      Mental Status: She is alert.   Psychiatric:         Behavior: Behavior is cooperative.         Assessment/Plan  Problem List Items Addressed This Visit       Type 2 diabetes mellitus without complication, without long-term current use of insulin (Multi)     Controlled  Monitor fasting blood sugar  Monitor for signs and symptoms of hyper or hypoglycemia         GERD (gastroesophageal reflux disease)     Monitor GI symptoms  PPI         Hypertension, essential     Monitor blood pressure         Partial symptomatic epilepsy with complex partial seizures, not intractable, with status epilepticus (Multi) - Primary     Follow-up with neurology outpatient  Keppra  Ativan as needed  Lacosamide  Monitor Keppra level         Moderate Alzheimer's dementia with anxiety, unspecified timing of dementia onset (Multi)     Monitor for safety  Assist with care as needed  Follow-up with neurology outpatient  Aricept         HLD (hyperlipidemia)     Statin  Monitor labs          Hospital records reviewed  Medications, treatments, and labs reviewed  Continue medications and treatments as listed in EMR  Discussed with nursing and therapy      Scribe Attestation  DANIELLA, Gretel Alcantaraibjazzmine   attest that this documentation has been prepared under the direction and in the presence of Christopher Jaime MD    Provider  Attestation - Scribe documentation  All medical record entries made by the Scribe were at my direction and personally dictated by me. I have reviewed the chart and agree that the record accurately reflects my personal performance of the history, physical exam, discussion and plan.   Christopher Jaime MD      Electronically Signed By: Christopher Jaime MD   10/16/24  2:03 PM

## 2024-10-22 ENCOUNTER — NURSING HOME VISIT (OUTPATIENT)
Dept: POST ACUTE CARE | Facility: EXTERNAL LOCATION | Age: 74
End: 2024-10-22
Payer: COMMERCIAL

## 2024-10-22 DIAGNOSIS — I10 HYPERTENSION, ESSENTIAL: ICD-10-CM

## 2024-10-22 DIAGNOSIS — R53.1 WEAKNESS: Primary | ICD-10-CM

## 2024-10-22 DIAGNOSIS — F02.B4 MODERATE ALZHEIMER'S DEMENTIA WITH ANXIETY, UNSPECIFIED TIMING OF DEMENTIA ONSET (MULTI): ICD-10-CM

## 2024-10-22 DIAGNOSIS — R13.10 DYSPHAGIA, UNSPECIFIED TYPE: ICD-10-CM

## 2024-10-22 DIAGNOSIS — G30.9 MODERATE ALZHEIMER'S DEMENTIA WITH ANXIETY, UNSPECIFIED TIMING OF DEMENTIA ONSET (MULTI): ICD-10-CM

## 2024-10-22 DIAGNOSIS — G40.201 PARTIAL SYMPTOMATIC EPILEPSY WITH COMPLEX PARTIAL SEIZURES, NOT INTRACTABLE, WITH STATUS EPILEPTICUS (MULTI): ICD-10-CM

## 2024-10-22 PROCEDURE — 99309 SBSQ NF CARE MODERATE MDM 30: CPT | Performed by: INTERNAL MEDICINE

## 2024-10-22 NOTE — PROGRESS NOTES
PROGRESS NOTE    Subjective   Chief complaint: Estella M Lanese is a 74 y.o. female who is a long term care patient being seen and evaluated for weakness.    HPI:  HPI  Patient is currently working with therapy, working towards goals.  Therapy is focusing on gait training with use of a wheeled walker and patient is ambulating up to 40 feet with min assist.  Therapy also working on completing ADL tasks for upper and lower body and patient requires min to mod assist.  Speech therapy working patient to address dysphagia.  Patient appears to be in no acute distress.  Denies chest pain, shortness of breath, nausea vomiting fever chills.    Objective   Vital signs: 134/66, 76, 16, 97% 98.1    Physical Exam  Constitutional:       General: She is not in acute distress.  Eyes:      Extraocular Movements: Extraocular movements intact.   Cardiovascular:      Rate and Rhythm: Normal rate and regular rhythm.   Pulmonary:      Effort: Pulmonary effort is normal.      Breath sounds: Normal breath sounds.   Musculoskeletal:      Cervical back: Neck supple.   Neurological:      Mental Status: She is alert.   Psychiatric:         Behavior: Behavior is cooperative.         Assessment/Plan   Problem List Items Addressed This Visit       Hypertension, essential     Monitor blood pressure         Partial symptomatic epilepsy with complex partial seizures, not intractable, with status epilepticus (Multi)     Follow-up with neurology outpatient  Keppra  Ativan as needed  Lacosamide  Monitor Keppra level         Moderate Alzheimer's dementia with anxiety, unspecified timing of dementia onset (Multi)     Monitor for safety  Assist with care as needed  Follow-up with neurology outpatient  Aricept         Weakness - Primary     Work with therapy towards goals established         Dysphagia     Speech therapy          Medications, treatments, and labs reviewed  Continue medications and treatments as listed in EMR    Scribe Attestation  I,  Gretel Alcantaraibe   attest that this documentation has been prepared under the direction and in the presence of Christopher Jaime MD    Provider Attestation - Scribe documentation  All medical record entries made by the Scribe were at my direction and personally dictated by me. I have reviewed the chart and agree that the record accurately reflects my personal performance of the history, physical exam, discussion and plan.   Christopher Jaime MD

## 2024-10-22 NOTE — ASSESSMENT & PLAN NOTE
Speech therapy   Protopic Counseling: Patient may experience a mild burning sensation during topical application. Protopic is not approved in children less than 2 years of age. There have been case reports of hematologic and skin malignancies in patients using topical calcineurin inhibitors although causality is questionable.

## 2024-10-22 NOTE — LETTER
Patient: Estella Lanese  : 1950    Encounter Date: 10/22/2024    PROGRESS NOTE    Subjective  Chief complaint: Estella M Lanese is a 74 y.o. female who is a long term care patient being seen and evaluated for weakness.    HPI:  HPI  Patient is currently working with therapy, working towards goals.  Therapy is focusing on gait training with use of a wheeled walker and patient is ambulating up to 40 feet with min assist.  Therapy also working on completing ADL tasks for upper and lower body and patient requires min to mod assist.  Speech therapy working patient to address dysphagia.  Patient appears to be in no acute distress.  Denies chest pain, shortness of breath, nausea vomiting fever chills.    Objective  Vital signs: 134/66, 76, 16, 97% 98.1    Physical Exam  Constitutional:       General: She is not in acute distress.  Eyes:      Extraocular Movements: Extraocular movements intact.   Cardiovascular:      Rate and Rhythm: Normal rate and regular rhythm.   Pulmonary:      Effort: Pulmonary effort is normal.      Breath sounds: Normal breath sounds.   Musculoskeletal:      Cervical back: Neck supple.   Neurological:      Mental Status: She is alert.   Psychiatric:         Behavior: Behavior is cooperative.         Assessment/Plan  Problem List Items Addressed This Visit       Hypertension, essential     Monitor blood pressure         Partial symptomatic epilepsy with complex partial seizures, not intractable, with status epilepticus (Multi)     Follow-up with neurology outpatient  Keppra  Ativan as needed  Lacosamide  Monitor Keppra level         Moderate Alzheimer's dementia with anxiety, unspecified timing of dementia onset (Multi)     Monitor for safety  Assist with care as needed  Follow-up with neurology outpatient  Aricept         Weakness - Primary     Work with therapy towards goals established         Dysphagia     Speech therapy          Medications, treatments, and labs reviewed  Continue  medications and treatments as listed in EMR    Scribe Attestation  I, Kellen Alcantara   attest that this documentation has been prepared under the direction and in the presence of Christopher Jaime MD    Provider Attestation - Scribe documentation  All medical record entries made by the Scribe were at my direction and personally dictated by me. I have reviewed the chart and agree that the record accurately reflects my personal performance of the history, physical exam, discussion and plan.   Christopher Jaime MD            Electronically Signed By: Christopher Jaime MD   10/23/24  4:03 PM

## 2024-10-29 ENCOUNTER — NURSING HOME VISIT (OUTPATIENT)
Dept: POST ACUTE CARE | Facility: EXTERNAL LOCATION | Age: 74
End: 2024-10-29
Payer: COMMERCIAL

## 2024-10-29 DIAGNOSIS — K21.9 GASTROESOPHAGEAL REFLUX DISEASE WITHOUT ESOPHAGITIS: ICD-10-CM

## 2024-10-29 DIAGNOSIS — R53.1 WEAKNESS: Primary | ICD-10-CM

## 2024-10-29 DIAGNOSIS — G30.9 MODERATE ALZHEIMER'S DEMENTIA WITH ANXIETY, UNSPECIFIED TIMING OF DEMENTIA ONSET (MULTI): ICD-10-CM

## 2024-10-29 DIAGNOSIS — G40.201 PARTIAL SYMPTOMATIC EPILEPSY WITH COMPLEX PARTIAL SEIZURES, NOT INTRACTABLE, WITH STATUS EPILEPTICUS (MULTI): ICD-10-CM

## 2024-10-29 DIAGNOSIS — F02.B4 MODERATE ALZHEIMER'S DEMENTIA WITH ANXIETY, UNSPECIFIED TIMING OF DEMENTIA ONSET (MULTI): ICD-10-CM

## 2024-10-29 DIAGNOSIS — I10 HYPERTENSION, ESSENTIAL: ICD-10-CM

## 2024-10-29 PROCEDURE — 99308 SBSQ NF CARE LOW MDM 20: CPT | Performed by: INTERNAL MEDICINE

## 2024-11-05 ENCOUNTER — NURSING HOME VISIT (OUTPATIENT)
Dept: POST ACUTE CARE | Facility: EXTERNAL LOCATION | Age: 74
End: 2024-11-05
Payer: MEDICARE

## 2024-11-05 DIAGNOSIS — K21.9 GASTROESOPHAGEAL REFLUX DISEASE WITHOUT ESOPHAGITIS: ICD-10-CM

## 2024-11-05 DIAGNOSIS — I10 HYPERTENSION, ESSENTIAL: ICD-10-CM

## 2024-11-05 DIAGNOSIS — E11.9 TYPE 2 DIABETES MELLITUS WITHOUT COMPLICATION, WITHOUT LONG-TERM CURRENT USE OF INSULIN (MULTI): ICD-10-CM

## 2024-11-05 DIAGNOSIS — R53.1 WEAKNESS: Primary | ICD-10-CM

## 2024-11-05 PROCEDURE — 99308 SBSQ NF CARE LOW MDM 20: CPT | Performed by: INTERNAL MEDICINE

## 2024-11-05 NOTE — LETTER
Patient: Estella Lanese  : 1950    Encounter Date: 2024    PROGRESS NOTE    Subjective  Chief complaint: Estella M Lanese is a 74 y.o. female who is a long term care patient being seen and evaluated for weakness.    HPI:  HPI  Patient presents for f/u therapy and general medical care. Therapy has been working with the patient to improve strength, endurance, ADLs, and transfers d/t generalized weakness.  Patient seen and examined at bedside.   Patient has no acute concerns today.  Nursing staff voicing no new concerns.  Patient denies constitutional symptoms.    Objective  Vital signs: 134/66, 76, 16, 98.1, 97% blood sugar 162    Physical Exam  Constitutional:       General: She is not in acute distress.  Eyes:      Extraocular Movements: Extraocular movements intact.   Cardiovascular:      Rate and Rhythm: Normal rate and regular rhythm.   Pulmonary:      Effort: Pulmonary effort is normal.      Breath sounds: Normal breath sounds.   Musculoskeletal:      Cervical back: Neck supple.   Neurological:      Mental Status: She is alert.      Motor: Weakness present.   Psychiatric:         Behavior: Behavior is cooperative.         Assessment/Plan  Problem List Items Addressed This Visit       Type 2 diabetes mellitus without complication, without long-term current use of insulin (Multi)     Controlled  Monitor fasting blood sugar  Monitor for signs and symptoms of hyper or hypoglycemia         GERD (gastroesophageal reflux disease)     Monitor GI symptoms  PPI         Hypertension, essential     Monitor blood pressure         Weakness - Primary     Continue working with therapy          Medications, treatments, and labs reviewed  Continue medications and treatments as listed in EMR    Scribe Attestation  I, Kellen Alcantara   attest that this documentation has been prepared under the direction and in the presence of Christopher Jaime MD    Provider Attestation - Scribe documentation  All medical record  entries made by the Scribe were at my direction and personally dictated by me. I have reviewed the chart and agree that the record accurately reflects my personal performance of the history, physical exam, discussion and plan.   Christopher Jaime MD            Electronically Signed By: Christopher Jaime MD   11/6/24 11:46 AM

## 2024-11-05 NOTE — PROGRESS NOTES
PROGRESS NOTE    Subjective   Chief complaint: Estella M Lanese is a 74 y.o. female who is a long term care patient being seen and evaluated for weakness.    HPI:  HPI  Patient presents for f/u therapy and general medical care. Therapy has been working with the patient to improve strength, endurance, ADLs, and transfers d/t generalized weakness.  Patient seen and examined at bedside.   Patient has no acute concerns today.  Nursing staff voicing no new concerns.  Patient denies constitutional symptoms.    Objective   Vital signs: 134/66, 76, 16, 98.1, 97% blood sugar 162    Physical Exam  Constitutional:       General: She is not in acute distress.  Eyes:      Extraocular Movements: Extraocular movements intact.   Cardiovascular:      Rate and Rhythm: Normal rate and regular rhythm.   Pulmonary:      Effort: Pulmonary effort is normal.      Breath sounds: Normal breath sounds.   Musculoskeletal:      Cervical back: Neck supple.   Neurological:      Mental Status: She is alert.      Motor: Weakness present.   Psychiatric:         Behavior: Behavior is cooperative.         Assessment/Plan   Problem List Items Addressed This Visit       Type 2 diabetes mellitus without complication, without long-term current use of insulin (Multi)     Controlled  Monitor fasting blood sugar  Monitor for signs and symptoms of hyper or hypoglycemia         GERD (gastroesophageal reflux disease)     Monitor GI symptoms  PPI         Hypertension, essential     Monitor blood pressure         Weakness - Primary     Continue working with therapy          Medications, treatments, and labs reviewed  Continue medications and treatments as listed in EMR    Scribe Attestation  I, Kellen Alcantara   attest that this documentation has been prepared under the direction and in the presence of Christopher Jaime MD    Provider Attestation - Scribe documentation  All medical record entries made by the Scribe were at my direction and personally  dictated by me. I have reviewed the chart and agree that the record accurately reflects my personal performance of the history, physical exam, discussion and plan.   Christopher Jaime MD

## 2024-11-19 ENCOUNTER — NURSING HOME VISIT (OUTPATIENT)
Dept: POST ACUTE CARE | Facility: EXTERNAL LOCATION | Age: 74
End: 2024-11-19
Payer: MEDICARE

## 2024-11-19 DIAGNOSIS — G40.201 PARTIAL SYMPTOMATIC EPILEPSY WITH COMPLEX PARTIAL SEIZURES, NOT INTRACTABLE, WITH STATUS EPILEPTICUS (MULTI): ICD-10-CM

## 2024-11-19 DIAGNOSIS — E11.9 TYPE 2 DIABETES MELLITUS WITHOUT COMPLICATION, WITHOUT LONG-TERM CURRENT USE OF INSULIN (MULTI): ICD-10-CM

## 2024-11-19 DIAGNOSIS — K21.9 GASTROESOPHAGEAL REFLUX DISEASE WITHOUT ESOPHAGITIS: ICD-10-CM

## 2024-11-19 DIAGNOSIS — I10 HYPERTENSION, ESSENTIAL: ICD-10-CM

## 2024-11-19 DIAGNOSIS — F32.A DEPRESSION, UNSPECIFIED DEPRESSION TYPE: ICD-10-CM

## 2024-11-19 DIAGNOSIS — F02.B4 MODERATE ALZHEIMER'S DEMENTIA WITH ANXIETY, UNSPECIFIED TIMING OF DEMENTIA ONSET (MULTI): Primary | ICD-10-CM

## 2024-11-19 DIAGNOSIS — G30.9 MODERATE ALZHEIMER'S DEMENTIA WITH ANXIETY, UNSPECIFIED TIMING OF DEMENTIA ONSET (MULTI): Primary | ICD-10-CM

## 2024-11-19 PROCEDURE — 99309 SBSQ NF CARE MODERATE MDM 30: CPT | Performed by: INTERNAL MEDICINE

## 2024-11-19 NOTE — PROGRESS NOTES
PROGRESS NOTE    Subjective   Chief complaint: Estella M Lanese is a 74 y.o. female who is a long term care patient being seen and evaluated for monthly general medical care and follow-up    HPI:  HPI  Patient presents for general medical care and f/u.  Patient seen and examined at bedside.  No issues per nursing.  Patient has no acute complaints.  Patient has dementia and requires assist with ADLs.  HTN Denies chest pain and headache.  GERD controlled.  Denies heartburn, regurgitation, epigastric discomfort, sour taste, and cough.  DM, denies polydipsia polyuria polyphagia.  Patient with diagnosis of depression.  Mood is stable.  Denies feeling down and thoughts of harming self or others.  Seizures controlled with no breakthrough seizure activity.  Tolerating antiepileptic with no increased drowsiness or sedation.  Mentation at baseline, no acute distress.    Objective   Vital signs: 134/66, 76, 16, 98.1, 97%     Physical Exam  Constitutional:       General: She is not in acute distress.  Eyes:      Extraocular Movements: Extraocular movements intact.   Cardiovascular:      Rate and Rhythm: Normal rate and regular rhythm.   Pulmonary:      Effort: Pulmonary effort is normal.      Breath sounds: Normal breath sounds.   Musculoskeletal:      Cervical back: Neck supple.   Neurological:      Mental Status: She is alert.   Psychiatric:         Behavior: Behavior is cooperative.         Assessment/Plan   Problem List Items Addressed This Visit       Depression     Monitor mood and behaviors         Type 2 diabetes mellitus without complication, without long-term current use of insulin (Multi)     Controlled  Monitor fasting blood sugar  Monitor for signs and symptoms of hyper or hypoglycemia         GERD (gastroesophageal reflux disease)     Monitor GI symptoms  PPI         Hypertension, essential     Monitor blood pressure         Partial symptomatic epilepsy with complex partial seizures, not intractable, with status  epilepticus (Multi)     Follow-up with neurology outpatient  Keppra  Ativan as needed  Lacosamide  Monitor Keppra level         Moderate Alzheimer's dementia with anxiety, unspecified timing of dementia onset (Multi) - Primary     Monitor for safety  Assist with care as needed  Follow-up with neurology outpatient  Aricept          Medications, treatments, and labs reviewed  Continue medications and treatments as listed in EMR    Scribe Attestation  I, Gretel Alcantaraibjazzmine   attest that this documentation has been prepared under the direction and in the presence of Christopher Jaime MD    Provider Attestation - Scribe documentation  All medical record entries made by the Scribe were at my direction and personally dictated by me. I have reviewed the chart and agree that the record accurately reflects my personal performance of the history, physical exam, discussion and plan.   Christopher Jaime MD

## 2024-11-19 NOTE — LETTER
Patient: Estella Lanese  : 1950    Encounter Date: 2024    PROGRESS NOTE    Subjective  Chief complaint: Estella M Lanese is a 74 y.o. female who is a long term care patient being seen and evaluated for monthly general medical care and follow-up    HPI:  HPI  Patient presents for general medical care and f/u.  Patient seen and examined at bedside.  No issues per nursing.  Patient has no acute complaints.  Patient has dementia and requires assist with ADLs.  HTN Denies chest pain and headache.  GERD controlled.  Denies heartburn, regurgitation, epigastric discomfort, sour taste, and cough.  DM, denies polydipsia polyuria polyphagia.  Patient with diagnosis of depression.  Mood is stable.  Denies feeling down and thoughts of harming self or others.  Seizures controlled with no breakthrough seizure activity.  Tolerating antiepileptic with no increased drowsiness or sedation.  Mentation at baseline, no acute distress.    Objective  Vital signs: 134/66, 76, 16, 98.1, 97%     Physical Exam  Constitutional:       General: She is not in acute distress.  Eyes:      Extraocular Movements: Extraocular movements intact.   Cardiovascular:      Rate and Rhythm: Normal rate and regular rhythm.   Pulmonary:      Effort: Pulmonary effort is normal.      Breath sounds: Normal breath sounds.   Musculoskeletal:      Cervical back: Neck supple.   Neurological:      Mental Status: She is alert.   Psychiatric:         Behavior: Behavior is cooperative.         Assessment/Plan  Problem List Items Addressed This Visit       Depression     Monitor mood and behaviors         Type 2 diabetes mellitus without complication, without long-term current use of insulin (Multi)     Controlled  Monitor fasting blood sugar  Monitor for signs and symptoms of hyper or hypoglycemia         GERD (gastroesophageal reflux disease)     Monitor GI symptoms  PPI         Hypertension, essential     Monitor blood pressure         Partial symptomatic  epilepsy with complex partial seizures, not intractable, with status epilepticus (Multi)     Follow-up with neurology outpatient  Keppra  Ativan as needed  Lacosamide  Monitor Keppra level         Moderate Alzheimer's dementia with anxiety, unspecified timing of dementia onset (Multi) - Primary     Monitor for safety  Assist with care as needed  Follow-up with neurology outpatient  Aricept          Medications, treatments, and labs reviewed  Continue medications and treatments as listed in EMR    Scribe Attestation  I, Kellen Alcantara   attest that this documentation has been prepared under the direction and in the presence of Christopher Jaime MD    Provider Attestation - Scribe documentation  All medical record entries made by the Scribe were at my direction and personally dictated by me. I have reviewed the chart and agree that the record accurately reflects my personal performance of the history, physical exam, discussion and plan.   Christopher Jaime MD            Electronically Signed By: Christopher Jaime MD   11/20/24  1:01 PM

## 2024-11-26 ENCOUNTER — NURSING HOME VISIT (OUTPATIENT)
Dept: POST ACUTE CARE | Facility: EXTERNAL LOCATION | Age: 74
End: 2024-11-26
Payer: MEDICARE

## 2024-11-26 DIAGNOSIS — K21.9 GASTROESOPHAGEAL REFLUX DISEASE WITHOUT ESOPHAGITIS: ICD-10-CM

## 2024-11-26 DIAGNOSIS — R53.1 WEAKNESS: ICD-10-CM

## 2024-11-26 DIAGNOSIS — G30.9 MODERATE ALZHEIMER'S DEMENTIA WITH ANXIETY, UNSPECIFIED TIMING OF DEMENTIA ONSET (MULTI): Primary | ICD-10-CM

## 2024-11-26 DIAGNOSIS — I10 HYPERTENSION, ESSENTIAL: ICD-10-CM

## 2024-11-26 DIAGNOSIS — F02.B4 MODERATE ALZHEIMER'S DEMENTIA WITH ANXIETY, UNSPECIFIED TIMING OF DEMENTIA ONSET (MULTI): Primary | ICD-10-CM

## 2024-11-26 PROCEDURE — 99308 SBSQ NF CARE LOW MDM 20: CPT | Performed by: INTERNAL MEDICINE

## 2024-11-26 NOTE — LETTER
Patient: Estella Lanese  : 1950    Encounter Date: 2024    PROGRESS NOTE    Subjective  Chief complaint: Estella M Lanese is a 74 y.o. female who is a long term care patient being seen and evaluated for weakness.    HPI:  HPI  Patient is currently working with therapy, working on gait training with no assistive device, ambulating up to 50 feet.  Patient also working on transfers from various surfaces and patient is able to complete with supervision. Patient continues to work toward goals.  Patient is stable and has no new complaints.  Nursing staff voices no new concerns today.  Patient was seen and examined at the bedside, appears to be in no acute distress.    Objective  Vital signs: 134/66, 76, 16, 98.1, 97%     Physical Exam  Constitutional:       General: She is not in acute distress.  Eyes:      Extraocular Movements: Extraocular movements intact.   Cardiovascular:      Rate and Rhythm: Normal rate and regular rhythm.   Pulmonary:      Effort: Pulmonary effort is normal.      Breath sounds: Normal breath sounds.   Musculoskeletal:      Cervical back: Neck supple.   Neurological:      Mental Status: She is alert.   Psychiatric:         Behavior: Behavior is cooperative.         Assessment/Plan  Problem List Items Addressed This Visit       GERD (gastroesophageal reflux disease)     Monitor GI symptoms  PPI         Hypertension, essential     Monitor blood pressure         Moderate Alzheimer's dementia with anxiety, unspecified timing of dementia onset (Multi) - Primary     Monitor for safety  Assist with care as needed  Follow-up with neurology outpatient  Aricept         Weakness     Work towards goals with therapy          Medications, treatments, and labs reviewed  Continue medications and treatments as listed in EMR    Scribe Attestation  DANIELLA, Kellen Alcantara   attest that this documentation has been prepared under the direction and in the presence of Christopher Jaime MD    Provider  Attestation - Scribe documentation  All medical record entries made by the Scribe were at my direction and personally dictated by me. I have reviewed the chart and agree that the record accurately reflects my personal performance of the history, physical exam, discussion and plan.   Christopher Jaime MD            Electronically Signed By: Christopher Jaime MD   11/27/24  1:10 PM

## 2024-11-26 NOTE — PROGRESS NOTES
PROGRESS NOTE    Subjective   Chief complaint: Estella M Lanese is a 74 y.o. female who is a long term care patient being seen and evaluated for weakness.    HPI:  HPI  Patient is currently working with therapy, working on gait training with no assistive device, ambulating up to 50 feet.  Patient also working on transfers from various surfaces and patient is able to complete with supervision. Patient continues to work toward goals.  Patient is stable and has no new complaints.  Nursing staff voices no new concerns today.  Patient was seen and examined at the bedside, appears to be in no acute distress.    Objective   Vital signs: 134/66, 76, 16, 98.1, 97%     Physical Exam  Constitutional:       General: She is not in acute distress.  Eyes:      Extraocular Movements: Extraocular movements intact.   Cardiovascular:      Rate and Rhythm: Normal rate and regular rhythm.   Pulmonary:      Effort: Pulmonary effort is normal.      Breath sounds: Normal breath sounds.   Musculoskeletal:      Cervical back: Neck supple.   Neurological:      Mental Status: She is alert.   Psychiatric:         Behavior: Behavior is cooperative.         Assessment/Plan   Problem List Items Addressed This Visit       GERD (gastroesophageal reflux disease)     Monitor GI symptoms  PPI         Hypertension, essential     Monitor blood pressure         Moderate Alzheimer's dementia with anxiety, unspecified timing of dementia onset (Multi) - Primary     Monitor for safety  Assist with care as needed  Follow-up with neurology outpatient  Aricept         Weakness     Work towards goals with therapy          Medications, treatments, and labs reviewed  Continue medications and treatments as listed in EMR    Scribe Attestation  I, Kellen Alcantara   attest that this documentation has been prepared under the direction and in the presence of Christopher Jaime MD    Provider Attestation - Scribe documentation  All medical record entries made by the  Scribe were at my direction and personally dictated by me. I have reviewed the chart and agree that the record accurately reflects my personal performance of the history, physical exam, discussion and plan.   Christopher Jaime MD

## 2024-12-17 ENCOUNTER — NURSING HOME VISIT (OUTPATIENT)
Dept: POST ACUTE CARE | Facility: EXTERNAL LOCATION | Age: 74
End: 2024-12-17
Payer: MEDICARE

## 2024-12-17 DIAGNOSIS — K21.9 GASTROESOPHAGEAL REFLUX DISEASE WITHOUT ESOPHAGITIS: ICD-10-CM

## 2024-12-17 DIAGNOSIS — F02.B4 MODERATE ALZHEIMER'S DEMENTIA WITH ANXIETY, UNSPECIFIED TIMING OF DEMENTIA ONSET (MULTI): Primary | ICD-10-CM

## 2024-12-17 DIAGNOSIS — G30.9 MODERATE ALZHEIMER'S DEMENTIA WITH ANXIETY, UNSPECIFIED TIMING OF DEMENTIA ONSET (MULTI): Primary | ICD-10-CM

## 2024-12-17 DIAGNOSIS — F32.A DEPRESSION, UNSPECIFIED DEPRESSION TYPE: ICD-10-CM

## 2024-12-17 DIAGNOSIS — G40.201 PARTIAL SYMPTOMATIC EPILEPSY WITH COMPLEX PARTIAL SEIZURES, NOT INTRACTABLE, WITH STATUS EPILEPTICUS (MULTI): ICD-10-CM

## 2024-12-17 DIAGNOSIS — E78.5 HYPERLIPIDEMIA, UNSPECIFIED HYPERLIPIDEMIA TYPE: ICD-10-CM

## 2024-12-17 DIAGNOSIS — I10 HYPERTENSION, ESSENTIAL: ICD-10-CM

## 2024-12-17 DIAGNOSIS — E11.9 TYPE 2 DIABETES MELLITUS WITHOUT COMPLICATION, WITHOUT LONG-TERM CURRENT USE OF INSULIN (MULTI): ICD-10-CM

## 2024-12-17 PROCEDURE — 99309 SBSQ NF CARE MODERATE MDM 30: CPT | Performed by: INTERNAL MEDICINE

## 2024-12-17 NOTE — PROGRESS NOTES
PROGRESS NOTE    Subjective   Chief complaint: Estella M Lanese is a 74 y.o. female who is a long term care patient being seen and evaluated for monthly general medical care and follow-up    HPI:  HPI  An interactive audio and/or video telecommunication system which permits real time communications between the patient (at the originating site) and provider (at a distant site) was utilized to provide this telehealth service after obtaining verbal consent.   Patient presents for general medical care and f/u.  Patient seen and examined at bedside.  No issues per nursing.  Patient has no acute complaints.  Patient has dementia and requires assist with ADLs.  HTN Denies chest pain and headache.  GERD controlled.  Denies heartburn, regurgitation, epigastric discomfort, sour taste, and cough.  Patient with diagnosis of depression.  Mood is stable.  Denies feeling down and thoughts of harming self or others.  DM, denies polydipsia polyuria polyphagia.  Seizures controlled with no breakthrough seizure activity.  Tolerating antiepileptic with no increased drowsiness or sedation.  HLD denies muscle aches.  Mentation at baseline, no acute distress.    Objective   Vital signs: 168/102, 89, 18, 98.9, 98%    Physical Exam  Constitutional:       General: She is not in acute distress.  Eyes:      Extraocular Movements: Extraocular movements intact.   Cardiovascular:      Rate and Rhythm: Normal rate and regular rhythm.   Pulmonary:      Effort: Pulmonary effort is normal.      Breath sounds: Normal breath sounds.   Musculoskeletal:      Cervical back: Neck supple.   Neurological:      Mental Status: She is alert.   Psychiatric:         Behavior: Behavior is cooperative.         Assessment/Plan   Problem List Items Addressed This Visit       Depression     Monitor mood and behaviors         Type 2 diabetes mellitus without complication, without long-term current use of insulin (Multi)     Controlled  Monitor fasting blood  sugar  Monitor for signs and symptoms of hyper or hypoglycemia         GERD (gastroesophageal reflux disease)     Monitor GI symptoms  PPI         Hypertension, essential     Monitor blood pressure  Nursing to recheck blood pressure, elevated         Partial symptomatic epilepsy with complex partial seizures, not intractable, with status epilepticus (Multi)     Follow-up with neurology outpatient  Keppra  Ativan as needed  Lacosamide  Monitor Keppra level         Moderate Alzheimer's dementia with anxiety, unspecified timing of dementia onset (Multi) - Primary     Monitor for safety  Assist with care as needed  Follow-up with neurology outpatient  Aricept         HLD (hyperlipidemia)     Statin  Monitor labs          Medications, treatments, and labs reviewed  Continue medications and treatments as listed in EMR    Scribe Attestation  I, Gretel Alcantaraibe   attest that this documentation has been prepared under the direction and in the presence of Christopher Jaime MD    Provider Attestation - Scribe documentation  All medical record entries made by the Scribe were at my direction and personally dictated by me. I have reviewed the chart and agree that the record accurately reflects my personal performance of the history, physical exam, discussion and plan.   Christopher Jaime MD

## 2024-12-17 NOTE — LETTER
Patient: Estella Lanese  : 1950    Encounter Date: 2024    PROGRESS NOTE    Subjective  Chief complaint: Estella M Lanese is a 74 y.o. female who is a long term care patient being seen and evaluated for monthly general medical care and follow-up    HPI:  HPI  An interactive audio and/or video telecommunication system which permits real time communications between the patient (at the originating site) and provider (at a distant site) was utilized to provide this telehealth service after obtaining verbal consent.   Patient presents for general medical care and f/u.  Patient seen and examined at bedside.  No issues per nursing.  Patient has no acute complaints.  Patient has dementia and requires assist with ADLs.  HTN Denies chest pain and headache.  GERD controlled.  Denies heartburn, regurgitation, epigastric discomfort, sour taste, and cough.  Patient with diagnosis of depression.  Mood is stable.  Denies feeling down and thoughts of harming self or others.  DM, denies polydipsia polyuria polyphagia.  Seizures controlled with no breakthrough seizure activity.  Tolerating antiepileptic with no increased drowsiness or sedation.  HLD denies muscle aches.  Mentation at baseline, no acute distress.    Objective  Vital signs: 168/102, 89, 18, 98.9, 98%    Physical Exam  Constitutional:       General: She is not in acute distress.  Eyes:      Extraocular Movements: Extraocular movements intact.   Cardiovascular:      Rate and Rhythm: Normal rate and regular rhythm.   Pulmonary:      Effort: Pulmonary effort is normal.      Breath sounds: Normal breath sounds.   Musculoskeletal:      Cervical back: Neck supple.   Neurological:      Mental Status: She is alert.   Psychiatric:         Behavior: Behavior is cooperative.         Assessment/Plan  Problem List Items Addressed This Visit       Depression     Monitor mood and behaviors         Type 2 diabetes mellitus without complication, without long-term current use  of insulin (Multi)     Controlled  Monitor fasting blood sugar  Monitor for signs and symptoms of hyper or hypoglycemia         GERD (gastroesophageal reflux disease)     Monitor GI symptoms  PPI         Hypertension, essential     Monitor blood pressure  Nursing to recheck blood pressure, elevated         Partial symptomatic epilepsy with complex partial seizures, not intractable, with status epilepticus (Multi)     Follow-up with neurology outpatient  Keppra  Ativan as needed  Lacosamide  Monitor Keppra level         Moderate Alzheimer's dementia with anxiety, unspecified timing of dementia onset (Multi) - Primary     Monitor for safety  Assist with care as needed  Follow-up with neurology outpatient  Aricept         HLD (hyperlipidemia)     Statin  Monitor labs          Medications, treatments, and labs reviewed  Continue medications and treatments as listed in EMR    Scribe Attestation  I, Kellen Alcantara   attest that this documentation has been prepared under the direction and in the presence of Christopher Jaime MD    Provider Attestation - Scribe documentation  All medical record entries made by the Scribe were at my direction and personally dictated by me. I have reviewed the chart and agree that the record accurately reflects my personal performance of the history, physical exam, discussion and plan.   Christopher Jaime MD            Electronically Signed By: Christopher Jaime MD   12/18/24 10:26 AM

## 2025-05-27 ENCOUNTER — APPOINTMENT (OUTPATIENT)
Dept: NEUROLOGY | Facility: CLINIC | Age: 75
End: 2025-05-27
Payer: MEDICARE